# Patient Record
Sex: FEMALE | Race: WHITE | NOT HISPANIC OR LATINO | Employment: UNEMPLOYED | URBAN - METROPOLITAN AREA
[De-identification: names, ages, dates, MRNs, and addresses within clinical notes are randomized per-mention and may not be internally consistent; named-entity substitution may affect disease eponyms.]

---

## 2017-06-27 ENCOUNTER — ALLSCRIPTS OFFICE VISIT (OUTPATIENT)
Dept: OTHER | Facility: OTHER | Age: 37
End: 2017-06-27

## 2017-06-27 PROCEDURE — G0145 SCR C/V CYTO,THINLAYER,RESCR: HCPCS | Performed by: OBSTETRICS & GYNECOLOGY

## 2017-06-28 ENCOUNTER — LAB REQUISITION (OUTPATIENT)
Dept: LAB | Facility: HOSPITAL | Age: 37
End: 2017-06-28
Payer: COMMERCIAL

## 2017-06-28 DIAGNOSIS — Z01.419 ENCOUNTER FOR GYNECOLOGICAL EXAMINATION WITHOUT ABNORMAL FINDING: ICD-10-CM

## 2017-07-09 LAB
LAB AP GYN PRIMARY INTERPRETATION: NORMAL
Lab: NORMAL

## 2017-07-10 ENCOUNTER — GENERIC CONVERSION - ENCOUNTER (OUTPATIENT)
Dept: OTHER | Facility: OTHER | Age: 37
End: 2017-07-10

## 2017-09-25 ENCOUNTER — ALLSCRIPTS OFFICE VISIT (OUTPATIENT)
Dept: OTHER | Facility: OTHER | Age: 37
End: 2017-09-25

## 2017-09-25 ENCOUNTER — LAB REQUISITION (OUTPATIENT)
Dept: LAB | Facility: HOSPITAL | Age: 37
End: 2017-09-25
Payer: COMMERCIAL

## 2017-09-25 DIAGNOSIS — R30.0 DYSURIA: ICD-10-CM

## 2017-09-25 LAB
BACTERIA UR QL AUTO: NEGATIVE
BILIRUB UR QL STRIP: NEGATIVE
CLARITY UR: CLEAR
CLUE CELL (HISTORICAL): NEGATIVE
COLOR UR: YELLOW
GLUCOSE UR STRIP-MCNC: NEGATIVE MG/DL
HGB UR QL STRIP.AUTO: NEGATIVE
HYPHAL YEAST (HISTORICAL): NEGATIVE
KETONES UR STRIP-MCNC: NEGATIVE MG/DL
KOH PREP (HISTORICAL): NEGATIVE
LEUKOCYTE ESTERASE UR QL STRIP: NEGATIVE
NITRITE UR QL STRIP: NEGATIVE
PH UR STRIP.AUTO: 6 [PH] (ref 4.5–8)
PROT UR STRIP-MCNC: NEGATIVE MG/DL
SP GR UR STRIP.AUTO: 1.03 (ref 1–1.03)
TRICHOMONAS (HISTORICAL): NEGATIVE
UROBILINOGEN UR QL STRIP.AUTO: 1 E.U./DL
YEAST (HISTORICAL): NEGATIVE

## 2017-09-25 PROCEDURE — 87086 URINE CULTURE/COLONY COUNT: CPT | Performed by: OBSTETRICS & GYNECOLOGY

## 2017-09-25 PROCEDURE — 81003 URINALYSIS AUTO W/O SCOPE: CPT | Performed by: OBSTETRICS & GYNECOLOGY

## 2017-09-26 LAB — BACTERIA UR CULT: NORMAL

## 2017-09-27 ENCOUNTER — GENERIC CONVERSION - ENCOUNTER (OUTPATIENT)
Dept: OTHER | Facility: OTHER | Age: 37
End: 2017-09-27

## 2017-09-29 ENCOUNTER — ALLSCRIPTS OFFICE VISIT (OUTPATIENT)
Dept: OTHER | Facility: OTHER | Age: 37
End: 2017-09-29

## 2017-09-29 DIAGNOSIS — K43.2 INCISIONAL HERNIA, WITHOUT OBSTRUCTION OR GANGRENE: ICD-10-CM

## 2017-10-18 RX ORDER — ACETAMINOPHEN 500 MG
500 TABLET ORAL EVERY 6 HOURS PRN
COMMUNITY

## 2017-10-18 RX ORDER — IBUPROFEN 400 MG/1
TABLET ORAL EVERY 6 HOURS PRN
COMMUNITY
End: 2022-03-11 | Stop reason: ALTCHOICE

## 2017-10-18 RX ORDER — HYDROXYZINE HYDROCHLORIDE 10 MG/1
25 TABLET, FILM COATED ORAL EVERY 6 HOURS PRN
COMMUNITY
End: 2022-03-11 | Stop reason: ALTCHOICE

## 2017-10-18 RX ORDER — ALBUTEROL SULFATE 90 UG/1
2 AEROSOL, METERED RESPIRATORY (INHALATION) EVERY 6 HOURS PRN
COMMUNITY

## 2017-10-18 NOTE — PRE-PROCEDURE INSTRUCTIONS
Pre-Surgery Instructions:   Medication Instructions    acetaminophen (TYLENOL) 500 mg tablet Instructed patient per Anesthesia Guidelines   albuterol (PROVENTIL HFA,VENTOLIN HFA) 90 mcg/act inhaler Instructed patient per Anesthesia Guidelines   cetirizine (ZyrTEC) 10 mg tablet Instructed patient per Anesthesia Guidelines   cyanocobalamin (VITAMIN B-12) 1,000 mcg tablet Instructed patient per Anesthesia Guidelines   fluticasone (FLONASE) 50 mcg/act nasal spray Instructed patient per Anesthesia Guidelines   hydrOXYzine HCL (ATARAX) 10 mg tablet Instructed patient per Anesthesia Guidelines   ibuprofen (MOTRIN) 400 mg tablet Instructed patient per Anesthesia Guidelines   Levonorgestrel-Ethinyl Estrad (ORSYTHIA PO) Instructed patient per Anesthesia Guidelines   pantoprazole (PROTONIX) 40 mg tablet Instructed patient per Anesthesia Guidelines   Triamcinolone Acetonide (KENALOG EX) Instructed patient per Anesthesia Guidelines  Per anesthesia patient was told to stop NSAIDS and supplements one week preop and on DOS with sip of water to take Pantoprazole  Was instructed to use inhaler if needed

## 2017-10-24 ENCOUNTER — ANESTHESIA EVENT (OUTPATIENT)
Dept: PERIOP | Facility: HOSPITAL | Age: 37
End: 2017-10-24
Payer: COMMERCIAL

## 2017-10-25 ENCOUNTER — ANESTHESIA (OUTPATIENT)
Dept: PERIOP | Facility: HOSPITAL | Age: 37
End: 2017-10-25
Payer: COMMERCIAL

## 2017-10-25 ENCOUNTER — HOSPITAL ENCOUNTER (OUTPATIENT)
Facility: HOSPITAL | Age: 37
Setting detail: OUTPATIENT SURGERY
Discharge: HOME | End: 2017-10-25
Attending: SURGERY | Admitting: SURGERY
Payer: COMMERCIAL

## 2017-10-25 ENCOUNTER — GENERIC CONVERSION - ENCOUNTER (OUTPATIENT)
Dept: PERIOP | Facility: HOSPITAL | Age: 37
End: 2017-10-25

## 2017-10-25 VITALS
DIASTOLIC BLOOD PRESSURE: 52 MMHG | SYSTOLIC BLOOD PRESSURE: 104 MMHG | BODY MASS INDEX: 39.11 KG/M2 | HEIGHT: 59 IN | WEIGHT: 194 LBS | RESPIRATION RATE: 16 BRPM | HEART RATE: 73 BPM | TEMPERATURE: 98.2 F | OXYGEN SATURATION: 95 %

## 2017-10-25 PROBLEM — E66.9 OBESITY (BMI 35.0-39.9 WITHOUT COMORBIDITY): Chronic | Status: ACTIVE | Noted: 2017-10-25

## 2017-10-25 PROBLEM — K43.2 INCISIONAL HERNIA OF ANTERIOR ABDOMINAL WALL WITHOUT OBSTRUCTION OR GANGRENE: Status: ACTIVE | Noted: 2017-10-25

## 2017-10-25 LAB — EXT PREGNANCY TEST URINE: NEGATIVE

## 2017-10-25 PROCEDURE — C1781 MESH (IMPLANTABLE): HCPCS | Performed by: SURGERY

## 2017-10-25 PROCEDURE — 81025 URINE PREGNANCY TEST: CPT | Performed by: ANESTHESIOLOGY

## 2017-10-25 DEVICE — VENTRALEX HERNIA PATCH, 8.0 CM (3.2"), LARGE CIRCLE WITH STRAP
Type: IMPLANTABLE DEVICE | Site: ABDOMEN | Status: FUNCTIONAL
Brand: VENTRALEX

## 2017-10-25 RX ORDER — LIDOCAINE HYDROCHLORIDE 10 MG/ML
INJECTION, SOLUTION INFILTRATION; PERINEURAL AS NEEDED
Status: DISCONTINUED | OUTPATIENT
Start: 2017-10-25 | End: 2017-10-25 | Stop reason: SURG

## 2017-10-25 RX ORDER — ONDANSETRON 2 MG/ML
4 INJECTION INTRAMUSCULAR; INTRAVENOUS EVERY 4 HOURS PRN
Status: DISCONTINUED | OUTPATIENT
Start: 2017-10-25 | End: 2017-10-25 | Stop reason: HOSPADM

## 2017-10-25 RX ORDER — ONDANSETRON 2 MG/ML
4 INJECTION INTRAMUSCULAR; INTRAVENOUS ONCE AS NEEDED
Status: DISCONTINUED | OUTPATIENT
Start: 2017-10-25 | End: 2017-10-25 | Stop reason: HOSPADM

## 2017-10-25 RX ORDER — SODIUM CHLORIDE, SODIUM LACTATE, POTASSIUM CHLORIDE, CALCIUM CHLORIDE 600; 310; 30; 20 MG/100ML; MG/100ML; MG/100ML; MG/100ML
80 INJECTION, SOLUTION INTRAVENOUS CONTINUOUS
Status: DISCONTINUED | OUTPATIENT
Start: 2017-10-25 | End: 2017-10-25 | Stop reason: HOSPADM

## 2017-10-25 RX ORDER — ACETAMINOPHEN 325 MG/1
650 TABLET ORAL EVERY 4 HOURS PRN
Status: DISCONTINUED | OUTPATIENT
Start: 2017-10-25 | End: 2017-10-25 | Stop reason: HOSPADM

## 2017-10-25 RX ORDER — MIDAZOLAM HYDROCHLORIDE 1 MG/ML
INJECTION INTRAMUSCULAR; INTRAVENOUS AS NEEDED
Status: DISCONTINUED | OUTPATIENT
Start: 2017-10-25 | End: 2017-10-25 | Stop reason: SURG

## 2017-10-25 RX ORDER — FENTANYL CITRATE/PF 50 MCG/ML
25 SYRINGE (ML) INJECTION
Status: DISCONTINUED | OUTPATIENT
Start: 2017-10-25 | End: 2017-10-25 | Stop reason: HOSPADM

## 2017-10-25 RX ORDER — METOPROLOL TARTRATE 5 MG/5ML
INJECTION INTRAVENOUS AS NEEDED
Status: DISCONTINUED | OUTPATIENT
Start: 2017-10-25 | End: 2017-10-25 | Stop reason: SURG

## 2017-10-25 RX ORDER — FENTANYL CITRATE 50 UG/ML
INJECTION, SOLUTION INTRAMUSCULAR; INTRAVENOUS AS NEEDED
Status: DISCONTINUED | OUTPATIENT
Start: 2017-10-25 | End: 2017-10-25 | Stop reason: SURG

## 2017-10-25 RX ORDER — ROCURONIUM BROMIDE 10 MG/ML
INJECTION, SOLUTION INTRAVENOUS AS NEEDED
Status: DISCONTINUED | OUTPATIENT
Start: 2017-10-25 | End: 2017-10-25 | Stop reason: SURG

## 2017-10-25 RX ORDER — HYDROCODONE BITARTRATE AND ACETAMINOPHEN 5; 325 MG/1; MG/1
1 TABLET ORAL EVERY 4 HOURS PRN
Status: DISCONTINUED | OUTPATIENT
Start: 2017-10-25 | End: 2017-10-25 | Stop reason: HOSPADM

## 2017-10-25 RX ORDER — PROPOFOL 10 MG/ML
INJECTION, EMULSION INTRAVENOUS AS NEEDED
Status: DISCONTINUED | OUTPATIENT
Start: 2017-10-25 | End: 2017-10-25 | Stop reason: SURG

## 2017-10-25 RX ORDER — SUCCINYLCHOLINE CHLORIDE 20 MG/ML
INJECTION INTRAMUSCULAR; INTRAVENOUS AS NEEDED
Status: DISCONTINUED | OUTPATIENT
Start: 2017-10-25 | End: 2017-10-25 | Stop reason: SURG

## 2017-10-25 RX ORDER — HYDROCODONE BITARTRATE AND ACETAMINOPHEN 5; 325 MG/1; MG/1
1 TABLET ORAL EVERY 4 HOURS PRN
Qty: 30 TABLET | Refills: 0 | Status: SHIPPED | OUTPATIENT
Start: 2017-10-25 | End: 2018-11-24 | Stop reason: ALTCHOICE

## 2017-10-25 RX ORDER — HYDROMORPHONE HYDROCHLORIDE 2 MG/ML
INJECTION, SOLUTION INTRAMUSCULAR; INTRAVENOUS; SUBCUTANEOUS AS NEEDED
Status: DISCONTINUED | OUTPATIENT
Start: 2017-10-25 | End: 2017-10-25 | Stop reason: SURG

## 2017-10-25 RX ORDER — SODIUM CHLORIDE 9 MG/ML
125 INJECTION, SOLUTION INTRAVENOUS CONTINUOUS
Status: DISCONTINUED | OUTPATIENT
Start: 2017-10-25 | End: 2017-10-25 | Stop reason: HOSPADM

## 2017-10-25 RX ORDER — ONDANSETRON 2 MG/ML
INJECTION INTRAMUSCULAR; INTRAVENOUS AS NEEDED
Status: DISCONTINUED | OUTPATIENT
Start: 2017-10-25 | End: 2017-10-25 | Stop reason: SURG

## 2017-10-25 RX ORDER — GLYCOPYRROLATE 0.2 MG/ML
INJECTION INTRAMUSCULAR; INTRAVENOUS AS NEEDED
Status: DISCONTINUED | OUTPATIENT
Start: 2017-10-25 | End: 2017-10-25 | Stop reason: SURG

## 2017-10-25 RX ADMIN — ONDANSETRON HYDROCHLORIDE 4 MG: 2 INJECTION, SOLUTION INTRAVENOUS at 07:54

## 2017-10-25 RX ADMIN — FENTANYL CITRATE 50 MCG: 50 INJECTION, SOLUTION INTRAMUSCULAR; INTRAVENOUS at 07:43

## 2017-10-25 RX ADMIN — GLYCOPYRROLATE 0.4 MG: 0.2 INJECTION, SOLUTION INTRAMUSCULAR; INTRAVENOUS at 09:36

## 2017-10-25 RX ADMIN — DEXAMETHASONE SODIUM PHOSPHATE 8 MG: 10 INJECTION INTRAMUSCULAR; INTRAVENOUS at 07:54

## 2017-10-25 RX ADMIN — FENTANYL CITRATE 50 MCG: 50 INJECTION, SOLUTION INTRAMUSCULAR; INTRAVENOUS at 07:32

## 2017-10-25 RX ADMIN — HYDROMORPHONE HYDROCHLORIDE 0.5 MG: 2 INJECTION, SOLUTION INTRAMUSCULAR; INTRAVENOUS; SUBCUTANEOUS at 09:55

## 2017-10-25 RX ADMIN — ROCURONIUM BROMIDE 5 MG: 10 INJECTION, SOLUTION INTRAVENOUS at 07:37

## 2017-10-25 RX ADMIN — PROPOFOL 200 MG: 10 INJECTION, EMULSION INTRAVENOUS at 07:37

## 2017-10-25 RX ADMIN — METOPROLOL TARTRATE 1 MG: 5 INJECTION INTRAVENOUS at 08:44

## 2017-10-25 RX ADMIN — LIDOCAINE HYDROCHLORIDE 100 MG: 10 INJECTION, SOLUTION INFILTRATION; PERINEURAL at 07:37

## 2017-10-25 RX ADMIN — FENTANYL CITRATE 25 MCG: 50 INJECTION, SOLUTION INTRAMUSCULAR; INTRAVENOUS at 10:09

## 2017-10-25 RX ADMIN — SUCCINYLCHOLINE CHLORIDE 100 MG: 20 INJECTION, SOLUTION INTRAMUSCULAR; INTRAVENOUS at 07:37

## 2017-10-25 RX ADMIN — FENTANYL CITRATE 50 MCG: 50 INJECTION, SOLUTION INTRAMUSCULAR; INTRAVENOUS at 08:04

## 2017-10-25 RX ADMIN — ROCURONIUM BROMIDE 30 MG: 10 INJECTION, SOLUTION INTRAVENOUS at 07:49

## 2017-10-25 RX ADMIN — HYDROCODONE BITARTRATE AND ACETAMINOPHEN 1 TABLET: 5; 325 TABLET ORAL at 11:22

## 2017-10-25 RX ADMIN — GLYCOPYRROLATE 0.4 MG: 0.2 INJECTION, SOLUTION INTRAMUSCULAR; INTRAVENOUS at 09:32

## 2017-10-25 RX ADMIN — SODIUM CHLORIDE, SODIUM LACTATE, POTASSIUM CHLORIDE, AND CALCIUM CHLORIDE 80 ML/HR: .6; .31; .03; .02 INJECTION, SOLUTION INTRAVENOUS at 10:13

## 2017-10-25 RX ADMIN — SODIUM CHLORIDE 125 ML/HR: 0.9 INJECTION, SOLUTION INTRAVENOUS at 06:03

## 2017-10-25 RX ADMIN — ROCURONIUM BROMIDE 15 MG: 10 INJECTION, SOLUTION INTRAVENOUS at 08:10

## 2017-10-25 RX ADMIN — SODIUM CHLORIDE, SODIUM LACTATE, POTASSIUM CHLORIDE, AND CALCIUM CHLORIDE: .6; .31; .03; .02 INJECTION, SOLUTION INTRAVENOUS at 08:19

## 2017-10-25 RX ADMIN — NEOSTIGMINE METHYLSULFATE 2 MG: 1 INJECTION, SOLUTION INTRAMUSCULAR; INTRAVENOUS; SUBCUTANEOUS at 09:36

## 2017-10-25 RX ADMIN — HYDROMORPHONE HYDROCHLORIDE 0.5 MG: 2 INJECTION, SOLUTION INTRAMUSCULAR; INTRAVENOUS; SUBCUTANEOUS at 09:04

## 2017-10-25 RX ADMIN — HYDROMORPHONE HYDROCHLORIDE 0.5 MG: 2 INJECTION, SOLUTION INTRAMUSCULAR; INTRAVENOUS; SUBCUTANEOUS at 09:49

## 2017-10-25 RX ADMIN — METOPROLOL TARTRATE 1 MG: 5 INJECTION INTRAVENOUS at 09:02

## 2017-10-25 RX ADMIN — METOPROLOL TARTRATE 2 MG: 5 INJECTION INTRAVENOUS at 08:23

## 2017-10-25 RX ADMIN — ONDANSETRON 4 MG: 2 INJECTION INTRAMUSCULAR; INTRAVENOUS at 12:19

## 2017-10-25 RX ADMIN — FENTANYL CITRATE 25 MCG: 50 INJECTION, SOLUTION INTRAMUSCULAR; INTRAVENOUS at 10:15

## 2017-10-25 RX ADMIN — NEOSTIGMINE METHYLSULFATE 2 MG: 1 INJECTION, SOLUTION INTRAMUSCULAR; INTRAVENOUS; SUBCUTANEOUS at 09:32

## 2017-10-25 RX ADMIN — HYDROMORPHONE HYDROCHLORIDE 0.5 MG: 2 INJECTION, SOLUTION INTRAMUSCULAR; INTRAVENOUS; SUBCUTANEOUS at 09:54

## 2017-10-25 RX ADMIN — MIDAZOLAM HYDROCHLORIDE 2 MG: 1 INJECTION, SOLUTION INTRAMUSCULAR; INTRAVENOUS at 07:28

## 2017-10-25 RX ADMIN — CEFAZOLIN SODIUM 2000 MG: 2 SOLUTION INTRAVENOUS at 07:35

## 2017-10-25 RX ADMIN — FENTANYL CITRATE 50 MCG: 50 INJECTION, SOLUTION INTRAMUSCULAR; INTRAVENOUS at 08:17

## 2017-10-25 RX ADMIN — FENTANYL CITRATE 25 MCG: 50 INJECTION, SOLUTION INTRAMUSCULAR; INTRAVENOUS at 10:21

## 2017-10-25 NOTE — TREATMENT PLAN
Progress Note - General Surgery   Ronnell Sevilla 40 y o  female MRN: [id number]    Assessment & Plan:   1  Day of Surgery status post    Procedure(s):  LAPAROSCOPY DIAGNOSTIC, HERNIA REPAIR WITH MESH  WITH LYSIS ADHESIONS   by Elsa Robert MD on 10/25/2017  Patient reports pain with movement, well controlled with Vicodin  She has ambulated to the bathroom and was able to void spontaneously without difficulty  Currently she is standing up, getting dressed ready to leave same-day surgery  Her  is present  He is helping her to get dressed  We discussed intraoperative findings, lysis of adhesions, the use of mesh to repair this hernia and the fact that everything went well intraoperatively  All of her questions are answered  We will see her back for regularly scheduled follow-up        Sana Salinas PA-C  Date: 10/25/2017 Time: 1:28 PM   Pager: [phone]

## 2017-10-25 NOTE — DISCHARGE INSTRUCTIONS
[facility] Instructions  Dr Sayda Arellano MD, FACS    1  General: You will feel pulling sensations around the wound or funny aches and pains around the incisions  This is normal  Even minor surgery is a change in your body and this is your bodys way of reaction to it  If you have had abdominal surgery, it may help to support the incision with a small pillow or blanket for comfort when moving or coughing  2  Wound care: Make sure to remove the bandage in about 24 hours, unless instructed otherwise  You usually don't have to redress the wound after 24-48 hours, unless for comfort  Keep the incision clean and dry  Let air get to it  If this Steri-Strips fall off, just keep the wound clean  3  Water: You may shower over the wound, unless there are drain tubes left in place  Do not bathe or use a pool or hot tub until cleared by the physician  You may shower right over the staples or Steri-Strips and packing dry when you are done  4  Activity: You may go up and down stairs, walk as much as you are comfortable, but walk at least 3 times each day  If you have had abdominal surgery, do not lift anything heavier than 15 pounds for at least 2-4 weeks, unless cleared by the doctor  5  Diet: You may resume a regular diet  If you had a same-day surgery or overnight stay surgery, you may wish to eat lightly for a few days: soups, crackers, and sandwiches  You may resume a regular diet when ready  6  Medications: Resume all of your previous medications, unless told otherwise by the doctor  Avoid aspirin or ibuprofen (Advil, Motrin, etc ) products for 2-3 days after the date of surgery  You may, at that time, began to take them again  Tylenol is always fine, unless you are taking any narcotic pain medication containing Tylenol (such as Percocet, Darvocet, Vicodin, or anything containing acetaminophen)  Do not take Tylenol if you're taking these medications   You do not need to take the narcotic pain medications unless you are having significant pain and discomfort  7  Driving: You will need someone to drive you home on the day of surgery  Do not drive or make any important decisions while on narcotic pain medication or 24 hours and after anesthesia or sedation for surgery  Generally, you may drive when your off all narcotic pain medications  8  Upset Stomach: You may take Maalox, Tums, or similar items for an upset stomach  If your narcotic pain medication causes an upset stomach, do not take it on an empty stomach  Try taking it with at least some crackers or toast      9  Constipation: Patients often experienced constipation after surgery  You may take over-the-counter medication for this, such as Metamucil, Senokot, Dulcolax, milk of magnesia, etc  You may take a suppository unless you have had anorectal surgery such as a procedure on your hemorrhoids  If you experience significant nausea or vomiting after abdominal surgery, call the office before trying any of these medications  10  Call the office: If you are experiencing any of the following, fevers above 101 5°, significant nausea or vomiting, if the wound develops drainage and/or is excessive redness around the wound, or if you have significant diarrhea or other worsening symptoms  11  Pain: You may be given a prescription for pain  This will be given to the hospital, the day of surgery  12  Sexual Activity: You may resume sexual activity when you feel ready and comfortable and your incision is sealed and healed without apparent infection risk  13  Urination: If you haven't urinated in 6 hours, go directly to the ER for evaluation for urinary retention       [address], [address]  [place], [address]   Phone: [phone]

## 2017-10-25 NOTE — ANESTHESIA POSTPROCEDURE EVALUATION
Post-Op Assessment Note      CV Status:  Stable    Mental Status:  Alert and awake    Hydration Status:  Euvolemic    PONV Controlled:  Controlled    Airway Patency:  Patent    Post Op Vitals Reviewed:  Yes              BP      Temp      Pulse     Resp      SpO2

## 2017-10-25 NOTE — OP NOTE
LAPAROSCOPY DIAGNOSTIC, HERNIA REPAIR WITH MESH  WITH LYSIS ADHESIONS  Postoperative Note  PATIENT NAME: Chayito Angel  : 1980  MRN: [id number]  [facility] OR ROOM 04    Surgery Date: 10/25/2017    Consent:  The risks, benefits, and alternatives to the surgery were discussed with the patient and with the family prior to surgery, personally by Dr Yasmin Alatorre  If the consent was obtained by the physician assistant or other representative, the consent was reviewed once again personally by the operating physician  Common complications particular for this procedure as well as unusual complications were discussed, including but not limited to:  bleeding, wound infection, prolonged wound healing, open wounds, reoperation, leak from the bowel or viscus, leak from the bile duct or injury to adjacent or other organs or blood vessels in the abdomen  If the surgery was laparoscopic, it was discussed that possible open surgery could also occur during that same surgery and is always an option in laparoscopic surgery  A  was used if necessary  The patient expressed understanding of the issues discussed and wished and consented to the procedure to proceed  All questions were answered  Dr Yasmin Alatorre personally discussed the informed consent with this patient  Pre operative diagnosis:   Abdominal hernia without obstruction or gangrene [K46 9]    Operative Indications:  Symptomatic Ventral Hernia    Operative Findings:  Very low paramedian hernia measuring approximately 3 cm to the left of the midline incision  Incarcerated with a large amount of omentum  Uterus is very high riding at the level of the pubic tubercle  3    Post operative diagnosis:  Post-Op Diagnosis Codes:     * Abdominal hernia without obstruction or gangrene [K46 9]    Procedure:  Procedure(s):  LAPAROSCOPY DIAGNOSTIC, HERNIA REPAIR WITH MESH  WITH LYSIS ADHESIONS    Surgeon(s) and Role:     * Marlene Loyola MD - Primary     * Marin Silverman India Pugh MD - Assisting    The Physician Assistant was medically necessary for surgical safety the case including suturing, retraction, and hemostasis  No qualified resident was available  I was present for the entire procedure  Drains:       Specimens:  * No specimens in log *    Estimated Blood Loss:   200 mL    Anesthesia Type:   Choice     Procedure: The patient was seen in the Holding Room  The risks, benefits, complications, treatment options, and expected outcomes were discussed with the patient  The possibilities of reaction to medication, pulmonary aspiration, perforation of viscus, bleeding, recurrent infection, the need for additional procedures, failure to diagnose a condition, and creating a complication requiring transfusion or operation were discussed with the patient  The patient concurred with the proposed plan, giving informed consent  The site of surgery properly noted/marked  The patient was taken to Operating Room, identified as Bola Nidia  Staff confirmed patient name, , and procedure  A Time Out was held and the above information confirmed  The patient was placed supine  After the induction of anesthesia, a Kunz and oral gastric tube placed  They were prepped and draped in a sterile fashion  A bio-occulsive drape was placed over the skin during draping  An additional timeout was performed  A right upper quadrant incision was made, dissection carried up to the fascia, and the abdomen entered under direct vision  A pneumoperitoneum created and the camera inserted  There was no intra-abdominal injury or bleeding  Additional trochars were placed in the contralateral upper quadrant and right lower quadrant under direct vision  Any adhesions to the intra-abdominal wall were carefully lysed using a combination of blunt dissection, scissor dissection, and a harmonic scalpel or cautery where appropriate    Care was taken not to injure the bowel  The bowel was carefully inspected, and preserved  This was a tedious dissection due to large amount of adhesions in the lower pelvis  Many adhesions were lysed also of the bowel to the anterior abdominal wall  The uterus was very high-riding just to the level of the pubic tubercle and this had to be gently retracted and pushed down to allow enough room for the mesh  The defect was just to the left of midline  The defect was measured 3 cm cm  The fascia was closed, using a suture-passer and 1 PDS sutures, figure-of-eight x3  An appropriate sized mesh a large Ventralex for which the double-layer mesh and tabs were removed, was selected to fit the defect with at least 3-5 cm of overlap  This mesh was oriented to the defect  Alternating [place]-Basilio and Prolene sutures were placed in the mesh outside the abdomen  The mesh was then rolled, placed into the larger port site, unrolled and reoriented intra-abdominally  Circumferential stab incisions were made to secure the mesh to the anterior abdominal wall using the preplaced sutures  This ensured that the mesh laid flat against the intra-abdominal wall  Any gaps in the mesh were then secured using a stapling device  A double crown technique was used to secure the mesh with the stapling device  Great care was taken to ensure there were no gaps in the mesh  The abdomen was reinspected for hemostasis  There was no evidence of bleeding or bowel injury  The larger port sites were closed using an 0 PDS on a suture passer under direct vision  The pneumoperitoneum was released  All skin incisions were closed using 4-0 Monocryl subcuticular sutures  The stab incisions for the mesh sutures were closed using histocryl  All wounds were dressed with Steri-Strips and dressings if needed  Abdominal binder was placed on the abdomen        Instrument, sponge, and needle counts were correct prior to closure and at the conclusion of the case      This is a highly complex case including extensive lysis of adhesions and bowel and uterus involvement, the case took approximately twice as long as the average incisional hernia repair  Some portions of this records may have been generated with voice recognition software  There may be translation, syntax,  or grammatical errors  Occasional wrong word or "sound-a-like" substitutions may have occurred due to the inherent limitations of the voice recognition software  Read the chart carefully and recognize, using context, where substations may have occurred  If you have any questions, please contact the dictating provider for clarification or correction, as needed       Complications: None    Condition: Stable to PACU    SIGNATURE: Lukasz Ballard MD   DATE: October 25, 2017   TIME: 9:56 AM

## 2017-10-25 NOTE — DISCHARGE SUMMARY
Discharge Summary - Bola Jacob 40 y o  female MRN: [id number]    Unit/Bed#: [facility] Encounter: [id number]      Pre-Operative Diagnosis: Pre-Op Diagnosis Codes:     * Abdominal hernia without obstruction or gangrene [K46 9]    Post-Operative Diagnosis: Post-Op Diagnosis Codes:     * Abdominal hernia without obstruction or gangrene [K46 9]    Procedures Performed:  Procedure(s):  LAPAROSCOPY DIAGNOSTIC, HERNIA REPAIR WITH MESH  WITH LYSIS ADHESIONS    Surgeon: Manjula Avelar MD    See H & P for full details of admission and Operative Note for full details of operations performed  Patient was seen and examined prior to discharge  Provisions for Follow-Up Care:  See After Visit Summary for information related to follow-up care and home orders  Disposition: Home, in stable condition  Planned Readmission: No    Discharge Medications:  See after visit summary for reconciled discharge medications provided to patient and family  Post Operative instructions: Reviewed with patient and/or family  Some portions of this record may have been generated with voice recognition software  There may be translation, syntax,  or grammatical errors  Occasional wrong word or "sound-a-like" substitutions may have occurred due to the inherent limitations of the voice recognition software  Read the chart carefully and recognize, using context, where substations may have occurred  If you have any questions, please contact the dictating provider for clarification or correction, as needed       Signature:   Manjula Avelar MD  Date: 10/25/2017 Time: 9:58 AM

## 2017-10-25 NOTE — ANESTHESIA PREPROCEDURE EVALUATION
Review of Systems/Medical History  Patient summary reviewed        Cardiovascular  Negative cardio ROS Hypertension ,    Pulmonary  Asthma: , ,        GI/Hepatic    GERD well controlled,             Endo/Other  Negative endo/other ROS      GYN  Negative gynecology ROS          Hematology  Anemia ,     Musculoskeletal  Negative musculoskeletal ROS        Neurology  Negative neurology ROS      Psychology   Anxiety,            Physical Exam    Airway    Mallampati score: II  TM Distance: >3 FB  Neck ROM: full     Dental   upper dentures,     Cardiovascular  Comment: Negative ROS, Rhythm: regular, Rate: normal, Cardiovascular exam normal    Pulmonary  Pulmonary exam normal Breath sounds clear to auscultation,     Other Findings        Anesthesia Plan  ASA Score- 2       Anesthesia Type- general with ASA Monitors  Additional Monitors:   Airway Plan:           Induction- intravenous  Informed Consent- Anesthetic plan and risks discussed with patient

## 2017-11-03 ENCOUNTER — HOSPITAL ENCOUNTER (EMERGENCY)
Facility: HOSPITAL | Age: 37
Discharge: HOME | End: 2017-11-03
Attending: EMERGENCY MEDICINE | Admitting: EMERGENCY MEDICINE
Payer: COMMERCIAL

## 2017-11-03 VITALS
TEMPERATURE: 98 F | DIASTOLIC BLOOD PRESSURE: 62 MMHG | SYSTOLIC BLOOD PRESSURE: 131 MMHG | WEIGHT: 194.4 LBS | OXYGEN SATURATION: 100 % | BODY MASS INDEX: 39.26 KG/M2 | RESPIRATION RATE: 18 BRPM | HEART RATE: 86 BPM

## 2017-11-03 DIAGNOSIS — L25.9 ACUTE CONTACT DERMATITIS: Primary | ICD-10-CM

## 2017-11-03 PROCEDURE — 99283 EMERGENCY DEPT VISIT LOW MDM: CPT

## 2017-11-03 RX ORDER — HYDROXYZINE HYDROCHLORIDE 25 MG/1
25 TABLET, FILM COATED ORAL ONCE
Status: COMPLETED | OUTPATIENT
Start: 2017-11-03 | End: 2017-11-03

## 2017-11-03 RX ORDER — HYDROXYZINE HYDROCHLORIDE 25 MG/1
25 TABLET, FILM COATED ORAL EVERY 6 HOURS PRN
Qty: 30 TABLET | Refills: 0 | Status: SHIPPED | OUTPATIENT
Start: 2017-11-03 | End: 2018-11-24 | Stop reason: ALTCHOICE

## 2017-11-03 RX ADMIN — HYDROXYZINE HYDROCHLORIDE 25 MG: 25 TABLET, FILM COATED ORAL at 18:48

## 2017-11-03 NOTE — DISCHARGE INSTRUCTIONS
Contact Dermatitis   WHAT YOU NEED TO KNOW:   Contact dermatitis is a skin rash  It develops when you touch something that irritates your skin or causes an allergic reaction  DISCHARGE INSTRUCTIONS:   Call 911 for any of the following:   · You have sudden trouble breathing  · Your throat swells and you have trouble eating  · Your face is swollen  Contact your healthcare provider if:   · You have a fever  · Your blisters are draining pus  · Your rash spreads or does not get better, even after treatment  · You have questions or concerns about your condition or care  Medicines:   · Medicines  help decrease itching and swelling  They will be given as a topical medicine to apply to your rash or as a pill  · Take your medicine as directed  Contact your healthcare provider if you think your medicine is not helping or if you have side effects  Tell him or her if you are allergic to any medicine  Keep a list of the medicines, vitamins, and herbs you take  Include the amounts, and when and why you take them  Bring the list or the pill bottles to follow-up visits  Carry your medicine list with you in case of an emergency  Manage contact dermatitis:   · Take short baths or showers in cool water  Use mild soap or soap-free cleansers  Add oatmeal, baking soda, or cornstarch to the bath water to help decrease skin irritation  · Avoid skin irritants , such as makeup, hair products, soaps, and cleansers  Use products that do not contain perfume or dye  · Apply a cool compress to your rash  This will help soothe your skin  · Keep your skin moist   Rub unscented cream or lotion on your skin to prevent dryness and itching  Do this right after a bath or shower when your skin is still damp  Follow up with your healthcare provider or dermatologist in 2 to 3 days:  Write down your questions so you remember to ask them during your visits     © 2017 [facility] Information is for End User's use only and may not be sold, redistributed or otherwise used for commercial purposes  All illustrations and images included in CareNotes® are the copyrighted property of A D A M , Inc  or Vinicius Sandoval  The above information is an  only  It is not intended as medical advice for individual conditions or treatments  Talk to your doctor, nurse or pharmacist before following any medical regimen to see if it is safe and effective for you

## 2017-11-07 ENCOUNTER — ALLSCRIPTS OFFICE VISIT (OUTPATIENT)
Dept: OTHER | Facility: OTHER | Age: 37
End: 2017-11-07

## 2017-11-08 NOTE — PROGRESS NOTES
Assessment  1  History of Incisional hernia (013 21) (K43 2)    Discussion/Summary    80-year-old female status post laparoscopic repair of ventral incisional hernia with mesh by Dr Susan Acosta on October 25, 2017  great  Pictures from intraoperative laparoscopic repair reviewed with the patient  will see her back on as-needed basis  Chief Complaint  Patient is here for a post op  surgery- ventral hernia 10/25  No pathology Report  Patient said she had an allergic reaction to the steri strips  Patient said she went to the ED for her rash or hives she got from the wrapping  Patient said she has used hydrocortisone cream on the incision  no drainage or bleedingonly concern is the dizziness she is having  Post-Op  HPI: 80-year-old female status post laparoscopic repair of incisional hernia from prior C-sections in her lower abdominal wall  She did have an allergic reaction her abdominal wall skin to the Steri-Strips  She noted that this happened years ago when she had her gallbladder out but had for gotten so we were not aware of her adhesive allergy and we used Steri-Strips  prompted family doctor and then also to the ER  She was allergic to Benadryl topical hydrocortisone  This has helped  is eating, drinking, moving her bowels without difficulty  Some soreness in the right upper quadrant and left lower quadrant but otherwise feeling well  She has discontinued the use of her abdominal binder due to rash  Review of Systems    Constitutional: no fever-- and-- no chills  Eyes: no eye pain  ENT: no nosebleeds-- and-- no nasal discharge  Cardiovascular: no chest pain  Respiratory: no cough  Gastrointestinal: no nausea  Genitourinary: no pelvic pain  Musculoskeletal: no joint swelling  Integumentary: no itching  Neurological: no numbness  Psychiatric: no anxiety  Endocrine: no muscle weakness  Hematologic/Lymphatic: no tendency for easy bleeding  Active Problems  1   Abdominal hernia without obstruction or gangrene (553 20) (K46 9)   2  Acute frontal sinusitis (461 1) (J01 10)   3  Anemia (285 9) (D64 9)   4  Bloating (787 3) (R14 0)   5  BRBPR (bright red blood per rectum) (569 3) (K62 5)   6  Bronchitis (490) (J40)   7  Burning with urination (788 1) (R30 0)   8  Candidiasis (112 9) (B37 9)   9  Conjunctivitis (372 30) (H10 9)   10  Contraceptives (V25 02)   11  Encounter for preconception consultation (V26 49) (Z31 69)   12  Encounter for routine gynecological examination (V72 31) (Z01 419)   13  Eye irritation (379 99) (H57 8)   14  Fatigue (780 79) (R53 83)   15  Folliculitis (730 7) (F99 7)   16  Fungal dermatitis (111 9) (B36 9)   17  History of allergy (V15 09) (Z88 9)   18  Low back pain (724 2) (M54 5)   19  Morbid obesity (278 01) (E66 01)   20  Oral contraceptive prescribed (V25 01) (Z30 011)   21  Ovarian cyst (620 2) (N83 20)   22  Pharyngitis (462) (J02 9)   23  Request for sterilization (V49 89) (Z78 9)   24  Shortness of breath (786 05) (R06 02)   25  Sorethroat (462) (J02 9)   26  URTI (acute upper respiratory infection) (465 9) (J06 9)   27  Viral exanthem (057 9) (B09)   28  Vulvar abscess (616 4) (N76 4)    Social History   · Death Of A Fetus   · Never A Smoker   · Never Drank Alcohol   · No alcohol use   · No drug use    Current Meds   1  B-12 CAPS; Therapy: (Recorded:61Khw2641) to Recorded   2  Colace 100 MG Oral Capsule; TAKE 1 CAPSULE TWICE DAILY; Therapy: 81AFV3043 to (Evaluate:14Oct2017); Last Rx:98Gxq1111 Ordered   3  [employer] Iron 325 (65 Fe) MG Oral Tablet; Take 1 tablet twice daily with meals; Therapy: 27MKV9687 to (Evaluate:05Jun2017)  Requested for: 24XNC6438; Last   Rx:10Jun2016 Ordered   4  Flonase 50 MCG/ACT SUSP; Therapy: (Recorded:40Beh7719) to Recorded   5  Ondansetron 4 MG Oral Tablet Disintegrating; take 1 tablet every 6 hours as needed for   nausea; Therapy: 70RXT9417 to (Last Rx:29Sep2017) Ordered   6   Orsythia 0 1-20 MG-MCG Oral Tablet; Take 1 tablet daily as directed; Therapy: 09ZSA4825 to (Dickson Castelan)  Requested for: 27Jun2017; Last   KIARA:85EIU0696 Ordered   7  Pantoprazole Sodium 40 MG Oral Tablet Delayed Release; Therapy: 44NMQ5629 to Recorded   8  ZyrTEC Allergy 10 MG Oral Capsule; 1 tqablet daily; Therapy: (Recorded:60Gef0296) to Recorded    Allergies  1  Benadryl TABS   2  Nystatin Powder    Vitals   Recorded: 56CVL6220 09:16AM   Temperature 97 4 F, Tympanic   Heart Rate 88, R Radial   Pulse Quality Normal, R Radial   Respiration Quality Normal   Respiration 18   Systolic 226, RUE, Sitting   Diastolic 78, RUE, Sitting   Height 4 ft 11 in   Weight 191 lb 6 oz   BMI Calculated 38 65   BSA Calculated 1 81     Physical Exam    Constitutional   General appearance: No acute distress, well appearing and well nourished          Signatures   Electronically signed by : Kris Molina, [facility]; Nov 7 2017 10:02AM EST                       (Author)    Electronically signed by : Kayleen Menchaca MD; Nov 7 2017 11:11AM EST

## 2017-11-22 ENCOUNTER — GENERIC CONVERSION - ENCOUNTER (OUTPATIENT)
Dept: OTHER | Facility: OTHER | Age: 37
End: 2017-11-22

## 2017-11-24 ENCOUNTER — GENERIC CONVERSION - ENCOUNTER (OUTPATIENT)
Dept: OTHER | Facility: OTHER | Age: 37
End: 2017-11-24

## 2018-01-09 NOTE — MISCELLANEOUS
Message   Recorded as Task   Date: 03/14/2016 04:07 PM, Created By: Danny Kim   Task Name: Follow Up   Assigned To: [facility],Team   Regarding Patient: Donovan Maya, Status: Active   Comment:    Danny Kim - 14 Mar 2016 4:07 PM     TASK CREATED  Caller: Shauna Virginie  Routine post colonsocopy call placed to patient  She had her procedure on 3/11/16  Pathology is pending on the polyps removed  She had no problems to report at the time of this call  Henny Stern - 18 Mar 2016 2:33 PM     TASK EDITED  Called patient with the pathology results  I stressed to her the importance of follow up in 3 years as recommended  She verbalized understanding and she had no questions at the time of the call  Active Problems    1  Acute frontal sinusitis (461 1) (J01 10)   2  Anemia (285 9) (D64 9)   3  Bloating (787 3) (R14 0)   4  BRBPR (bright red blood per rectum) (569 3) (K62 5)   5  Bronchitis (490) (J40)   6  Candidiasis (112 9) (B37 9)   7  Conjunctivitis (372 30) (H10 9)   8  Contraceptives (V25 02)   9  Encounter for preconception consultation (V26 49) (Z31 69)   10  Encounter for routine gynecological examination (V72 31) (Z01 419)   11  Eye irritation (379 99) (H57 8)   12  Fatigue (780 79) (R53 83)   13  Folliculitis (274 1) (J00 9)   14  Fungal dermatitis (111 9) (B36 9)   15  Hernia, abdominal (553 9) (K46 9)   16  History of allergy (V15 09) (Z88 9)   17  Incisional hernia (553 21) (K43 2)   18  Morbid obesity (278 01) (E66 01)   19  Oral contraceptive prescribed (V25 01) (Z30 011)   20  Pharyngitis (462) (J02 9)   21  Shortness of breath (786 05) (R06 02)   22  Sorethroat (462) (J02 9)   23  URTI (acute upper respiratory infection) (465 9) (J06 9)   24  Viral exanthem (057 9) (B09)   25  Vulvar abscess (616 4) (N76 4)    Current Meds   1  Alaway 0 025 % Ophthalmic Solution; Therapy: 43DFJ7225 to Recorded   2   Clindamycin Phosphate 1 % External Lotion; as directed; Therapy: (Recorded:83Yre4718) to Recorded   3  Flonase 50 MCG/ACT Nasal Suspension (Fluticasone Propionate); Therapy: (Recorded:95Mww2444) to Recorded   4  Iron 325 (65 Fe) MG Oral Tablet; TAKE 1 TABLET TWICE DAILY WITH MEALS; Therapy: 10BPG0024 to (Evaluate:16Jun2016)  Requested for: 10TBA1898; Last   Rx:22Jun2015 Ordered   5  Nystatin 704216 UNIT/GM External Powder; APPLY 2-3 TIMES DAILY TO AFFECTED   AREA(S); Therapy: 64GIB2889 to (Evaluate:12Apr2016)  Requested for: 14FIT4853; Last   Rx:01Mar2016 Ordered   6  Orsythia 0 1-20 MG-MCG Oral Tablet; Take 1 tablet daily as directed; Therapy: 08NXH8677 to (Evaluate:54Blp0623)  Requested for: 11Aug2015; Last   Rx:11Aug2015 Ordered   7  Pantoprazole Sodium 40 MG Oral Tablet Delayed Release; Therapy: 58XAU7935 to Recorded   8  PNV Prenatal Plus Multivitamin 27-1 MG Oral Tablet; take one tablet by mouth one time   daily; Therapy: 18PRK7419 to (Evaluate:16Jun2016)  Requested for: 05TVV2218; Last   Rx:22Jun2015 Ordered   9  Triamcinolone Acetonide 0 1 % External Lotion; Therapy: (Recorded:83Jli0542) to Recorded   10  ZyrTEC Allergy 10 MG Oral Capsule; 1 tqablet daily; Therapy: (Recorded:93Bxz3500) to Recorded    Allergies    1   Benadryl TABS    Signatures   Electronically signed by : Tamiko Tirado, ; Mar 18 2016  2:33PM EST                       (Author)

## 2018-01-09 NOTE — PROGRESS NOTES
Assessment    1  Hernia, abdominal (263 9) (K46 9)    Discussion/Summary  Discussion Summary:   54-year-old female who presents to the office for follow-up on her CAT scan  On exam, she has a left lower quadrant abdominal wall hernia palpable  Unable to reduce at this time  Remainder of abdomen is benign  CAT scan of abdomen and pelvis reviewed personally and images reviewed with the patient  She is given a copy of the report  She understands to follow up with her OB/GYN doctor Aruna to follow-up with her ovarian cyst seen on the CAT scan  We will send Dr Stacey Cantu task to this the fact that she is aware to review this CAT scan as well  She will continue her weight loss program with the goal weight of less than 140 pounds  She understands the symptoms of obstruction is hernia  If she develops any of these, she will return to the ER urgently  If this becomes troublesome or increased in size or symptomatology, she will return for scheduled repair  Otherwise, we will wait for her to continue her weight loss in the hopes that we can repair this in the fall when she reaches weight  This is due to the fact that recurrence is rate is directly related to BMI, and optimizing her weight would be ideal before undergoing repair  Chief Complaint  Chief Complaint Free Text Note Form: Patient is here for follow-up of ventral hernia  -Little pain today  -no n/v/f/c  History of Present Illness  HPI: Patient is a 28year old female that presents for follow-up of left lower quadrant hernia and to review CT  She would like to discuss the results and would like to discuss timing for possible repair  Patient had colonoscopy on Friday and states this weekend had bloating and mild GI discomfort with constipation  Patient states today, she is feeling much better as she has had a bowel movement  Pertaining to her hernia, she states it bothers her when she does lifting however is not causing her any pain at present  She is in the weight-watchers course and is continuing to lose weight  She has lost approximately 55 lbs so far and would like to lose more  She is also recovering from yeast infection/rash underlying pannus where cesarian incision is which is being followed by her ob-gyn  Denies n/v/f/c  CT showed 3 cm in diameter ventral abdominal hernia with incidental finding of 3 7 x 3 0 cm right ovarian lesion  BG    36y/o female who presents for follow-up on abdominal CAT scan for evaluation of left lower quadrant ventral hernia  She is status post colonoscopy by Dr Veronique Alegre on Friday  Complaining of some bloating after her colonoscopy but now moving her bowels  With bowel movements some of this abdominal discomfort and bloating resolved  No nausea or vomiting  Comes in to follow-up on CAT scan of abdomen and pelvis from  to evaluate left lower quadrant abdominal wall hernia  On review of hernia images myself and with the patient, and CAT scan report, left lower quadrant incisional hernia is seen from likely her  section incision  Also there is an ovarian cyst  Hernia contents seen with no bowel and what appears to be omentum at the site of palpable abnormality on exam     She continues to lose weight and has lost 5 pounds since her last office visit  She has lost a total of over 55 pounds October  She continues to workout and has adjusted her lifestyle to continue weight loss  Her goal weight is approximately 60 pounds down from her current weight  Current weight is 197 8 pounds  Her goal weight is less than 140  She hopes to reach her goal weight within the next 6-8 months  Symptoms regarding this hernia really only happens when she is lifting her 11year-old daughter  Otherwise, she is able to go about her activities of daily living without any severe pain or modification  She continues working out  Review of Systems  Complete-Female:   Constitutional: no fever and no chills     ENT: no nosebleeds  Cardiovascular: no chest pain and no palpitations  Respiratory: no shortness of breath and no cough  Gastrointestinal: constipation, but as noted in HPI and no blood in stools  Genitourinary: No complaints of dysuria, no incontinence, no pelvic pain, no dysmenorrhea, no vaginal discharge or bleeding  Musculoskeletal: No complaints of arthralgias, no myalgias, no joint swelling or stiffness, no limb pain or swelling  Integumentary: a rash, but as noted in HPI  Neurological: no headache and no numbness  Psychiatric: Not suicidal, no sleep disturbance, no anxiety or depression, no change in personality, no emotional problems  Active Problems    1  Acute frontal sinusitis (461 1) (J01 10)   2  Anemia (285 9) (D64 9)   3  Bloating (787 3) (R14 0)   4  BRBPR (bright red blood per rectum) (569 3) (K62 5)   5  Bronchitis (490) (J40)   6  Candidiasis (112 9) (B37 9)   7  Conjunctivitis (372 30) (H10 9)   8  Contraceptives (V25 02)   9  Encounter for preconception consultation (V26 49) (Z31 69)   10  Encounter for routine gynecological examination (V72 31) (Z01 419)   11  Eye irritation (379 99) (H57 8)   12  Fatigue (780 79) (R53 83)   13  Folliculitis (786 6) (B41 0)   14  Fungal dermatitis (111 9) (B36 9)   15  Hernia, abdominal (553 9) (K46 9)   16  History of allergy (V15 09) (Z88 9)   17  Incisional hernia (553 21) (K43 2)   18  Morbid obesity (278 01) (E66 01)   19  Oral contraceptive prescribed (V25 01) (Z30 011)   20  Pharyngitis (462) (J02 9)   21  Shortness of breath (786 05) (R06 02)   22  Sorethroat (462) (J02 9)   23  URTI (acute upper respiratory infection) (465 9) (J06 9)   24  Viral exanthem (057 9) (B09)   25  Vulvar abscess (616 4) (N76 4)    Past Medical History    1  Acute sinusitis (461 9) (J01 90)   2  History of gastroesophageal reflux (GERD) (V12 79) (Z87 19)   3  History of seasonal allergies (V15 09) (Z88 9)   4   Oral contraceptive prescribed (V25 01) (Z30 011)  Active Problems And Past Medical History Reviewed: The active problems and past medical history were reviewed and updated today  Surgical History    1  History of  Section   2  History of Cholecystectomy   3  Contraceptives (V25 02)  Surgical History Reviewed: The surgical history was reviewed and updated today  Family History    1  Family history of skin cancer (V16 8) (Z80 8)    2  Family history of Diabetes Mellitus (V18 0)   3  Family history of Kidney Cancer (V16 51)    4  Family history of colon cancer (V16 0) (Z80 0)  Family History Reviewed: The family history was reviewed and updated today  Social History    · Death Of A Fetus   · Never A Smoker   · Never Drank Alcohol   · No drug use  Social History Reviewed: The social history was reviewed and updated today  The social history was reviewed and is unchanged  Current Meds   1  Alaway 0 025 % Ophthalmic Solution; Therapy: 86OSL2157 to Recorded   2  Clindamycin Phosphate 1 % External Lotion; as directed; Therapy: (Recorded:40Wlu1384) to Recorded   3  Flonase 50 MCG/ACT SUSP; Therapy: (Recorded:00Drc2020) to Recorded   4  Iron 325 (65 Fe) MG Oral Tablet; TAKE 1 TABLET TWICE DAILY WITH MEALS; Therapy: 29MTT0893 to (Evaluate:2016)  Requested for: 34ITB6352; Last   Rx:2015 Ordered   5  Nystatin 416609 UNIT/GM External Powder; APPLY 2-3 TIMES DAILY TO AFFECTED   AREA(S); Therapy: 20SBO9327 to (Evaluate:2016)  Requested for: 73PTA5285; Last   Rx:2016 Ordered   6  Orsythia 0 1-20 MG-MCG Oral Tablet; Take 1 tablet daily as directed; Therapy: 57XBS5751 to (Evaluate:29Wqw2485)  Requested for: 2015; Last   Rx:2015 Ordered   7  Pantoprazole Sodium 40 MG Oral Tablet Delayed Release; Therapy: 23TBF8681 to Recorded   8  PNV Prenatal Plus Multivitamin 27-1 MG Oral Tablet; take one tablet by mouth one time   daily;    Therapy: 75LHM9667 to (Evaluate:2016)  Requested for: 37IRB6882; Last   Rx:2015 Ordered   9  Triamcinolone Acetonide 0 1 % External Lotion; Therapy: (Recorded:92Zoe7520) to Recorded   10  ZyrTEC Allergy 10 MG Oral Capsule; 1 tqablet daily; Therapy: (Recorded:01Icp7697) to Recorded  Medication List Reviewed: The medication list was reviewed and updated today  Allergies    1  Benadryl TABS    Vitals  Vital Signs [Data Includes: Current Encounter]    Recorded: 86JAF1832 09:44AM   Temperature 98 2 F, Tympanic   Heart Rate 68   Respiration 18   Systolic 533, LUE   Diastolic 72, LUE   Height 4 ft 11 in   Weight 197 lb 12 8 oz   BMI Calculated 39 95   BSA Calculated 1 84     Physical Exam    Constitutional   General appearance: No acute distress, well appearing and well nourished  Eyes   Conjunctiva and lids: No swelling, erythema or discharge  Neck   Supple, symmetric, trachea midline, no masses   Pulmonary   Respiratory effort: No increased work of breathing or signs of respiratory distress  Auscultation of lungs: Clear to auscultation, equal breath sounds bilaterally, no wheezes, no rales, no rhonci  Cardiovascular   Auscultation of heart: Normal rate and rhythm, normal S1 and S2, without murmurs  Examination of extremities for edema and/or varicosities: Normal     Abdomen   Abdomen: Abnormal   palpable hernia left lower quadrant  Liver and spleen: No hepatomegaly or splenomegaly  Musculoskeletal   Gait and station: Normal     Skin   Skin and subcutaneous tissue: Abnormal   skin rash underling left pannus at  incision  Neurologic   Cranial nerves: Cranial nerves 2-12 intact  Psychiatric   Orientation to person, place, and time: Normal        Results/Data  Diagnostic Studies Reviewed: I personally reviewed the films/images/results in the office today  My interpretation follows  CT Scan Review 3 cm diameter LLQ herniation, fat, no bowel loops  -incidental 3 7 x 3 0 cm right ovarian lesions        Provider Comments  Provider Comments: This report has been generated by a voice recognition software system  Therefore, there may be syntax, spelling, and/or grammatical errors  Please call if you've any questions  Future Appointments    Date/Time Provider Specialty Site   06/23/2016 10:30 AM LINDA Mendez   Obstetrics/Gynecology [facility] [address]     Signatures   Electronically signed by : Julienne Ramírez [facility]; Mar 14 2016 10:51AM EST                       (Author)    Electronically signed by : Bossman Brooks MD; Mar 14 2016 10:56AM EST

## 2018-01-12 NOTE — MISCELLANEOUS
Message   Recorded as Task   Date: 03/22/2016 09:55 AM, Created By: Michael Cohn   Task Name: Call Patient with results   Assigned To: Kp Hernandez   Regarding Patient: Bunny Gamble, Status: Active   Comment:    IgoralaynaJosei - 22 Mar 2016 9:55 AM     Patient Phone: [phone]    resolution of ovarian cyst  please advise   Odalys Mcdaniel - 22 Mar 2016 5:17 PM     TASK REPLIED TO: Previously Assigned To Kp Hernandez  The patient is aware of the results  She did call in and state that she was having some pain on the right side and nausea  I made Dr Olman Hester aware and she is having the patient call the Doctor who is taking care of her hernia to let them know as well    see note below      Active Problems    1  Acute frontal sinusitis (461 1) (J01 10)   2  Anemia (285 9) (D64 9)   3  Bloating (787 3) (R14 0)   4  BRBPR (bright red blood per rectum) (569 3) (K62 5)   5  Bronchitis (490) (J40)   6  Candidiasis (112 9) (B37 9)   7  Conjunctivitis (372 30) (H10 9)   8  Contraceptives (V25 02)   9  Encounter for preconception consultation (V26 49) (Z31 69)   10  Encounter for routine gynecological examination (V72 31) (Z01 419)   11  Eye irritation (379 99) (H57 8)   12  Fatigue (780 79) (R53 83)   13  Folliculitis (290 9) (I95 3)   14  Fungal dermatitis (111 9) (B36 9)   15  Hernia, abdominal (553 9) (K46 9)   16  History of allergy (V15 09) (Z88 9)   17  Incisional hernia (553 21) (K43 2)   18  Morbid obesity (278 01) (E66 01)   19  Oral contraceptive prescribed (V25 01) (Z30 011)   20  Ovarian cyst (620 2) (N83 20)   21  Pharyngitis (462) (J02 9)   22  Shortness of breath (786 05) (R06 02)   23  Sorethroat (462) (J02 9)   24  URTI (acute upper respiratory infection) (465 9) (J06 9)   25  Viral exanthem (057 9) (B09)   26  Vulvar abscess (616 4) (N76 4)    Current Meds   1  Alaway 0 025 % Ophthalmic Solution; Therapy: 87XQX8809 to Recorded   2   Clindamycin Phosphate 1 % External Lotion; as directed; Therapy: (Recorded:70Bqn9227) to Recorded   3  Flonase 50 MCG/ACT Nasal Suspension (Fluticasone Propionate); Therapy: (Recorded:48Els3021) to Recorded   4  Iron 325 (65 Fe) MG Oral Tablet; TAKE 1 TABLET TWICE DAILY WITH MEALS; Therapy: 51TBW4938 to (Evaluate:16Jun2016)  Requested for: 56JLG9654; Last   Rx:22Jun2015 Ordered   5  Nystatin 161767 UNIT/GM External Powder; APPLY 2-3 TIMES DAILY TO AFFECTED   AREA(S); Therapy: 04DXI8667 to (Evaluate:12Apr2016)  Requested for: 23IYZ7523; Last   Rx:01Mar2016 Ordered   6  Orsythia 0 1-20 MG-MCG Oral Tablet; Take 1 tablet daily as directed; Therapy: 36ZCM2730 to (Evaluate:63Fuq8000)  Requested for: 11Aug2015; Last   Rx:11Aug2015 Ordered   7  Pantoprazole Sodium 40 MG Oral Tablet Delayed Release; Therapy: 42VFB9183 to Recorded   8  PNV Prenatal Plus Multivitamin 27-1 MG Oral Tablet; take one tablet by mouth one time   daily; Therapy: 24YSP5808 to (Evaluate:16Jun2016)  Requested for: 16IHQ2177; Last   Rx:22Jun2015 Ordered   9  Triamcinolone Acetonide 0 1 % External Lotion; Therapy: (Recorded:80Vyp6883) to Recorded   10  ZyrTEC Allergy 10 MG Oral Capsule; 1 tqablet daily; Therapy: (Recorded:10Coy6972) to Recorded    Allergies    1   Benadryl TABS    Signatures   Electronically signed by : LINDA Maharaj ; Mar 24 2016 10:50AM EST

## 2018-01-13 VITALS
DIASTOLIC BLOOD PRESSURE: 72 MMHG | BODY MASS INDEX: 38.75 KG/M2 | WEIGHT: 192.19 LBS | HEIGHT: 59 IN | SYSTOLIC BLOOD PRESSURE: 138 MMHG

## 2018-01-13 VITALS
SYSTOLIC BLOOD PRESSURE: 140 MMHG | WEIGHT: 195 LBS | HEIGHT: 59 IN | TEMPERATURE: 97.9 F | DIASTOLIC BLOOD PRESSURE: 80 MMHG | RESPIRATION RATE: 20 BRPM | HEART RATE: 88 BPM | BODY MASS INDEX: 39.31 KG/M2

## 2018-01-13 NOTE — RESULT NOTES
Verified Results  (1) THIN PREP PAP WITH IMAGING [id number] 10:51AM Claudine Billy     Test Name Result Flag Reference   LAB AP CASE REPORT (Report)     Gynecologic Cytology Report            Case: [id number]                  Authorizing Provider: Cyrus Garcia MD     Collected:      06/27/2017           First Screen:     ANDRÉS Petersen Received:      07/03/2017 1014        Specimen:  LIQUID-BASED PAP, SCREENING, Endocervical   LAB AP GYN PRIMARY INTERPRETATION      Negative for intraepithelial lesion or malignancy  Electronically signed by ANDRÉS Petersen on 7/9/2017 at 10:51 AM   LAB AP GYN SPECIMEN ADEQUACY      Satisfactory for evaluation  Absence of endocervical/transformation zone component  LAB AP GYN ADDITIONAL INFORMATION (Report)     [facility] FDA approved ,  and ThinPrep Imaging System are   utilized with strict adherence to the 's instruction manual to   prepare gynecologic and non-gynecologic cytology specimens for the   production of ThinPrep slides as well as for gynecologic ThinPrep imaging  These processes have been validated by our laboratory and/or by the     The Pap test is not a diagnostic procedure and should not be used as the   sole means to detect cervical cancer  It is only a screening procedure to   aid in the detection of cervical cancer and its precursors  Both   false-negative and false-positive results have been experienced  Your   patient's test result should be interpreted in this context together with   the history and clinical findings

## 2018-01-13 NOTE — MISCELLANEOUS
Message   Recorded as Task   Date: 11/24/2017 09:11 AM, Created By: Yoan Multani   Task Name: Follow Up   Assigned To: [facility],Team   Regarding Patient: Shahbaz Sanchez, Status: Active   Oscar Bundy - 24 Nov 2017 9:11 AM     TASK CREATED  Pt cancelled appt today, father is having emergant surgery  Pt states she has been wathing her food intact and she feels that this does have something to do with the discomfort  Going to monitor intake and if things do not resolve she will contact office to reschedule  Active Problems    1  Abdominal hernia without obstruction or gangrene (553 20) (K46 9)   2  Acute frontal sinusitis (461 1) (J01 10)   3  Anemia (285 9) (D64 9)   4  Bloating (787 3) (R14 0)   5  BRBPR (bright red blood per rectum) (569 3) (K62 5)   6  Bronchitis (490) (J40)   7  Burning with urination (788 1) (R30 0)   8  Candidiasis (112 9) (B37 9)   9  Conjunctivitis (372 30) (H10 9)   10  Contraceptives (V25 02)   11  Encounter for preconception consultation (V26 49) (Z31 69)   12  Encounter for routine gynecological examination (V72 31) (Z01 419)   13  Eye irritation (379 99) (H57 8)   14  Fatigue (780 79) (R53 83)   15  Folliculitis (428 7) (C33 5)   16  Fungal dermatitis (111 9) (B36 9)   17  History of allergy (V15 09) (Z88 9)   18  Low back pain (724 2) (M54 5)   19  Morbid obesity (278 01) (E66 01)   20  Oral contraceptive prescribed (V25 01) (Z30 011)   21  Ovarian cyst (620 2) (N83 20)   22  Pharyngitis (462) (J02 9)   23  Request for sterilization (V49 89) (Z78 9)   24  Shortness of breath (786 05) (R06 02)   25  Sorethroat (462) (J02 9)   26  URTI (acute upper respiratory infection) (465 9) (J06 9)   27  Viral exanthem (057 9) (B09)   28  Vulvar abscess (616 4) (N76 4)    Current Meds   1  B-12 CAPS; Therapy: (Recorded:10Wgg6423) to Recorded   2  Colace 100 MG Oral Capsule; TAKE 1 CAPSULE TWICE DAILY; Therapy: 16QDH9000 to (Evaluate:14Oct2017);  Last Rx: 69EOY1094 Ordered   3  [employer] Iron 325 (65 Fe) MG Oral Tablet; Take 1 tablet twice daily with meals; Therapy: 48RMN8102 to (Evaluate:05Jun2017)  Requested for: 37MWN7323; Last   Rx:10Jun2016 Ordered   4  Flonase 50 MCG/ACT SUSP (Fluticasone Propionate); Therapy: (Recorded:11Nmm5446) to Recorded   5  Ondansetron 4 MG Oral Tablet Disintegrating; take 1 tablet every 6 hours as needed for   nausea; Therapy: 84YRQ9905 to (Last Rx:04Swq4460) Ordered   6  Orsythia 0 1-20 MG-MCG Oral Tablet; Take 1 tablet daily as directed; Therapy: 89SKA4922 to ([facility])  Requested for: 27Jun2017; Last   RO:20GUU4435 Ordered   7  Pantoprazole Sodium 40 MG Oral Tablet Delayed Release; Therapy: 94PND2524 to Recorded   8  ZyrTEC Allergy 10 MG Oral Capsule; 1 tqablet daily; Therapy: (Recorded:57Jtq9182) to Recorded    Allergies    1  Benadryl TABS   2  Nystatin Powder   3   Adhesive Tape TAPE    Signatures   Electronically signed by : Harini Duncan, ; Nov 24 2017  1:02PM EST                       (Author)

## 2018-01-13 NOTE — MISCELLANEOUS
Message   Recorded as Task   Date: 10/26/2017 10:44 AM, Created By: Jazmín Trotter   Task Name: Follow Up   Assigned To: [facility],Team   Regarding Patient: Bipin Duran, Status: Active   CommentQuincy Benavidez - 26 Oct 2017 10:44 AM     TASK CREATED  Left message to return call post op hernia repair  Jazmín Trotter - 26 Oct 2017 10:59 AM     TASK EDITED  Spoke w/ pt and she is doing ok, c/o spasms on right side, taking pain meds which are helping  Drsg dry and intact no drainage noted  Is ambulating ad apollo and eating regular diet  Advised to contact office w/ any questions or concerns, post op visit 11/7  Active Problems    1  Abdominal hernia without obstruction or gangrene (553 20) (K46 9)   2  Acute frontal sinusitis (461 1) (J01 10)   3  Anemia (285 9) (D64 9)   4  Bloating (787 3) (R14 0)   5  BRBPR (bright red blood per rectum) (569 3) (K62 5)   6  Bronchitis (490) (J40)   7  Burning with urination (788 1) (R30 0)   8  Candidiasis (112 9) (B37 9)   9  Conjunctivitis (372 30) (H10 9)   10  Contraceptives (V25 02)   11  Encounter for preconception consultation (V26 49) (Z31 69)   12  Encounter for routine gynecological examination (V72 31) (Z01 419)   13  Eye irritation (379 99) (H57 8)   14  Fatigue (780 79) (R53 83)   15  Folliculitis (249 8) (K83 9)   16  Fungal dermatitis (111 9) (B36 9)   17  History of allergy (V15 09) (Z88 9)   18  Incisional hernia (553 21) (K43 2)   19  Low back pain (724 2) (M54 5)   20  Morbid obesity (278 01) (E66 01)   21  Oral contraceptive prescribed (V25 01) (Z30 011)   22  Ovarian cyst (620 2) (N83 20)   23  Pharyngitis (462) (J02 9)   24  Request for sterilization (V49 89) (Z78 9)   25  Shortness of breath (786 05) (R06 02)   26  Sorethroat (462) (J02 9)   27  URTI (acute upper respiratory infection) (465 9) (J06 9)   28  Viral exanthem (057 9) (B09)   29  Vulvar abscess (616 4) (N76 4)    Current Meds   1  B-12 CAPS;    Therapy: (Recorded:16Jzx7761) to Recorded   2  Colace 100 MG Oral Capsule; TAKE 1 CAPSULE TWICE DAILY; Therapy: 12DJM5795 to (Evaluate:14Oct2017); Last Rx:29Sep2017 Ordered   3  [facility] Iron 325 (65 Fe) MG Oral Tablet; Take 1 tablet twice daily with meals; Therapy: 88YMD9891 to (Evaluate:05Jun2017)  Requested for: 85HFG1809; Last   Rx:10Jun2016 Ordered   4  Flonase 50 MCG/ACT SUSP (Fluticasone Propionate); Therapy: (Recorded:52Gmc2953) to Recorded   5  Ondansetron 4 MG Oral Tablet Disintegrating; take 1 tablet every 6 hours as needed for   nausea; Therapy: 08ZWA5605 to (Last Rx:29Sep2017) Ordered   6  Orsythia 0 1-20 MG-MCG Oral Tablet; Take 1 tablet daily as directed; Therapy: 38LTK2859 to (Mitzi Relic)  Requested for: 27Jun2017; Last   TY:02YZN8717 Ordered   7  Pantoprazole Sodium 40 MG Oral Tablet Delayed Release; Therapy: 96LZH7255 to Recorded   8  ZyrTEC Allergy 10 MG Oral Capsule; 1 tqablet daily; Therapy: (Recorded:20Jkk8035) to Recorded    Allergies    1  Benadryl TABS   2   Nystatin Powder    Signatures   Electronically signed by : Luis Armando Campos, ; Oct 26 2017 11:00AM EST                       (Author)

## 2018-01-13 NOTE — PROGRESS NOTES
Assessment    1  Hernia, abdominal (553 9) (K46 9)   2  Fungal dermatitis (111 9) (B36 9)    Plan  Candidiasis    · DrRx Mycolog II Cream 15 Grams; apply to area bid for 2 weeks   Rx By: Lucrecia Floyd; Dispense: 14 Days ; #:1; Refill: 1; For: Candidiasis; LINDSAY = N; Faxed To: [facility]/PHARMACY #[id number] URTI (acute upper respiratory infection)    · Azithromycin 250 MG Oral Tablet   Rx By: Laina Bartlett; Dispense: 0 Days ; #:6 Tablet; Refill: 0; For: URTI (acute upper respiratory infection); LINDSAY = N; Print Rx; Last Updated By: Prabhu Spaulding; 2016 9:37:19 AM    Discussion/Summary  Discussion Summary:   Pt referred to Dr Veronique Alegre for evaluation of abdominal defect  Given rx for fungual dermatitis under pannous  rto prn  GYN Discussion and Summary:    Candidiasis--  Counseling Documentation With Imm: The patient was counseled regarding diagnostic results, instructions for management, risk factor reductions, prognosis, patient and family education, impressions, risks and benefits of treatment options, importance of compliance with treatment  total time of encounter was 25 minutes and 15 minutes was spent counseling  Chief Complaint  Chief Complaint Free Text Note Form: Pt presents today for a lump in her abdominal area  History of Present Illness  HPI: 36yo G3 with c/o painful lump in abdomen  States noticed it when she was straining with BM  Has recently lost #50 through weight watchers  No nausea, vomiting or change in bowel/bladder habits  S/P 3  sections  Review of Systems   Female ROS: pelvic pain, but no pelvic pressure, no vaginal pain, no vaginal discharge, no vaginal itching, no vaginal odor, no nonmenstrual bleeding, no vulvar pain, no vulvar itching, no vulvar lump or mass, no dysuria, no bladder pain, no burning sensation during urination, no change in urinary frequency, urine is not foul-smelling and no urinary loss of control     Focused-Female: Constitutional: No fever, no chills, feels well, no tiredness, no recent weight gain or loss  Gastrointestinal: no complaints of abdominal pain, no constipation, no nausea or diarrhea, no vomiting, no bloody stools  Genitourinary: no complaints of dysuria, no incontinence, no pelvic pain, no dysmenorrhea, no vaginal discharge or abnormal vaginal bleeding  Integumentary: as noted in HPI  Neurological: no complaints of headache, no confusion, no numbness or tingling, no dizziness or fainting  ROS Reviewed:   ROS reviewed  Active Problems    1  Acute frontal sinusitis (461 1) (J01 10)   2  Anemia (285 9) (D64 9)   3  Bloating (787 3) (R14 0)   4  Bronchitis (490) (J40)   5  Conjunctivitis (372 30) (H10 9)   6  Contraceptives (V25 02)   7  Encounter for preconception consultation (V26 49) (Z31 69)   8  Encounter for routine gynecological examination (V72 31) (Z01 419)   9  Eye irritation (379 99) (H57 8)   10  Fatigue (780 79) (R53 83)   11  Folliculitis (081 9) (E72 6)   12  History of allergy (V15 09) (Z88 9)   13  Oral contraceptive prescribed (V25 01) (Z30 011)   14  Pharyngitis (462) (J02 9)   15  Shortness of breath (786 05) (R06 02)   16  Sorethroat (462) (J02 9)   17  URTI (acute upper respiratory infection) (465 9) (J06 9)   18  Viral exanthem (057 9) (B09)   19  Vulvar abscess (616 4) (N76 4)    Past Medical History    1  Acute sinusitis (461 9) (J01 90)   2  Oral contraceptive prescribed (V25 01) (Z30 011)  Active Problems And Past Medical History Reviewed: The active problems and past medical history were reviewed and updated today  Surgical History    1  History of  Section   2  History of Cholecystectomy   3  Contraceptives (V25 02)  Surgical History Reviewed: The surgical history was reviewed and updated today  Family History    1  Family history of Diabetes Mellitus (V18 0)   2  Family history of Kidney Cancer (V16 51)    3   Family history of colon cancer (V16 0) (Z80 0)  Family History Reviewed: The family history was reviewed and updated today  Social History    · Death Of A Fetus   · Never A Smoker   · Never Drank Alcohol   · No drug use  Social History Reviewed: The social history was reviewed and updated today  The social history was reviewed and is unchanged  Current Meds   1  Alaway 0 025 % Ophthalmic Solution; Therapy: 46PNJ2846 to Recorded   2  Azithromycin 250 MG Oral Tablet; Take 2 tablets today, then 1 tablet daily for 4 days; Therapy: 00Aby5629 to (Last Rx:14Kxf9823) Ordered   3  Flonase 50 MCG/ACT SUSP; Therapy: (Recorded:88Ybp6250) to Recorded   4  Iron 325 (65 Fe) MG Oral Tablet; TAKE 1 TABLET TWICE DAILY WITH MEALS; Therapy: 88OAX5010 to (Evaluate:16Jun2016)  Requested for: 18MWO2555; Last   Rx:22Jun2015 Ordered   5  Orsythia 0 1-20 MG-MCG Oral Tablet; Take 1 tablet daily as directed; Therapy: 81BDR7937 to (Evaluate:38Kzr2454)  Requested for: 11Aug2015; Last   Rx:11Aug2015 Ordered   6  Pantoprazole Sodium 40 MG Oral Tablet Delayed Release; Therapy: 10PMU3098 to Recorded   7  PNV Prenatal Plus Multivitamin 27-1 MG Oral Tablet; take one tablet by mouth one time   daily; Therapy: 30YNR9362 to (Evaluate:16Jun2016)  Requested for: 72CMK3512; Last   DQ:92IEZ1295 Ordered  Medication List Reviewed: The medication list was reviewed and updated today  Allergies    1  Benadryl TABS    Vitals  Vital Signs [Data Includes: Current Encounter]    Recorded: 66VAL6390 20:38WA   Systolic 620   Diastolic 68   Height 4 ft 11 in   Weight 203 lb 8 96 oz   BMI Calculated 41 11   BSA Calculated 1 86   LMP 21-Feb-2016     Physical Exam    Constitutional   General appearance: No acute distress, well appearing and well nourished  Pulmonary   Respiratory effort: No increased work of breathing or signs of respiratory distress      Abdomen   Abdomen: Abnormal   (palpable defect with increased bulging when pt instructed to sit up w/o use of hands) The abdomen was obese  Skin findings: a scar in the suprapubic area   The abdomen was soft and nontender  6x6cm cm mass palpated in the left lower quadrant  fungal rash under pannous  Future Appointments    Date/Time Provider Specialty Site   06/23/2016 10:30 AM LINDA Ortiz  Obstetrics/Gynecology [facility]   02/29/2016 02:00 PM Phi Banks MD General Surgery [facility]     Signatures   Electronically signed by :  Citlalli Roy; Feb 24 2016 11:06AM EST                       (Author)    Electronically signed by : LINDA House ; Feb 25 2016  4:02PM EST

## 2018-01-13 NOTE — MISCELLANEOUS
Message   Recorded as Task   Date: 03/10/2016 11:15 AM, Created By: Deo Hurd   Task Name: Call Back   Assigned To: [facility],Team   Regarding Patient: Ira Bernard, Status: Active   Comment:    Deo Hurd - 10 Mar 2016 11:15 AM     TASK CREATED  Caller: Bhumi Adam, Spouse  Pateint's  called the office for the CT of the abdomen results from 3/9/16  Healther or Bhumi Blood can be reached at [phone] or [phone]  Henny Stern - 10 Mar 2016 11:30 AM     TASK EDITED  Called patient and informed her that the CT findings confirmed a hernia  Henny Stern - 10 Mar 2016 5:16 PM     TASK EDITED  MONA Schmid advises trippt come into the office for reassessment of the surgery approach as her BMI is in range for surgery  She will also need to sign consents for surgery  I called Denny Montoya and she is agreeable to come inot the office  Appointemnt scheduled on 3/14/16 @ 0945 with MONA Schmid Carla - 14 Mar 2016 2:11 PM     TASK EDITED  Patient was seen today in consultation  Active Problems    1  Acute frontal sinusitis (461 1) (J01 10)   2  Anemia (285 9) (D64 9)   3  Bloating (787 3) (R14 0)   4  BRBPR (bright red blood per rectum) (569 3) (K62 5)   5  Bronchitis (490) (J40)   6  Candidiasis (112 9) (B37 9)   7  Conjunctivitis (372 30) (H10 9)   8  Contraceptives (V25 02)   9  Encounter for preconception consultation (V26 49) (Z31 69)   10  Encounter for routine gynecological examination (V72 31) (Z01 419)   11  Eye irritation (379 99) (H57 8)   12  Fatigue (780 79) (R53 83)   13  Folliculitis (821 5) (Z52 6)   14  Fungal dermatitis (111 9) (B36 9)   15  Hernia, abdominal (553 9) (K46 9)   16  History of allergy (V15 09) (Z88 9)   17  Incisional hernia (553 21) (K43 2)   18  Morbid obesity (278 01) (E66 01)   19  Oral contraceptive prescribed (V25 01) (Z30 011)   20  Pharyngitis (462) (J02 9)   21  Shortness of breath (786 05) (R06 02)   22  Sorethroat (462) (J02 9)   23  URTI (acute upper respiratory infection) (465 9) (J06 9)   24  Viral exanthem (057 9) (B09)   25  Vulvar abscess (616 4) (N76 4)    Current Meds   1  Alaway 0 025 % Ophthalmic Solution; Therapy: 00JSZ9367 to Recorded   2  Clindamycin Phosphate 1 % External Lotion; as directed; Therapy: (Recorded:95Tsb9797) to Recorded   3  Flonase 50 MCG/ACT SUSP (Fluticasone Propionate); Therapy: (Recorded:40Jnz6665) to Recorded   4  Iron 325 (65 Fe) MG Oral Tablet; TAKE 1 TABLET TWICE DAILY WITH MEALS; Therapy: 83BVF2547 to (Evaluate:16Jun2016)  Requested for: 35KOV6605; Last   Rx:22Jun2015 Ordered   5  Nystatin 976889 UNIT/GM External Powder; APPLY 2-3 TIMES DAILY TO AFFECTED   AREA(S); Therapy: 55UXF4381 to (Evaluate:12Apr2016)  Requested for: 70KBX8721; Last   Rx:01Mar2016 Ordered   6  Orsythia 0 1-20 MG-MCG Oral Tablet; Take 1 tablet daily as directed; Therapy: 03PKZ7154 to (Evaluate:05Hzw9527)  Requested for: 11Aug2015; Last   Rx:11Aug2015 Ordered   7  Pantoprazole Sodium 40 MG Oral Tablet Delayed Release; Therapy: 67TFK1973 to Recorded   8  PNV Prenatal Plus Multivitamin 27-1 MG Oral Tablet; take one tablet by mouth one time   daily; Therapy: 62QRX3372 to (Evaluate:16Jun2016)  Requested for: 87XLR4115; Last   Rx:22Jun2015 Ordered   9  Triamcinolone Acetonide 0 1 % External Lotion; Therapy: (Recorded:10Pwb2245) to Recorded   10  ZyrTEC Allergy 10 MG Oral Capsule; 1 tqablet daily; Therapy: (Recorded:83Rgv4901) to Recorded    Allergies    1   Benadryl TABS    Signatures   Electronically signed by : Zahida Manuel, ; Mar 14 2016  2:11PM EST                       (Author)

## 2018-01-14 VITALS
TEMPERATURE: 97.4 F | HEART RATE: 88 BPM | DIASTOLIC BLOOD PRESSURE: 78 MMHG | WEIGHT: 191.38 LBS | SYSTOLIC BLOOD PRESSURE: 132 MMHG | HEIGHT: 59 IN | BODY MASS INDEX: 38.58 KG/M2 | RESPIRATION RATE: 18 BRPM

## 2018-01-14 NOTE — MISCELLANEOUS
Message  Mailed colono letter to patients home address  Mailed letter and pathology to Dr Tal Fournier  Updated pre-visit planning for 3-5 year followup  Active Problems    1  Acute frontal sinusitis (461 1) (J01 10)   2  Anemia (285 9) (D64 9)   3  Bloating (787 3) (R14 0)   4  BRBPR (bright red blood per rectum) (569 3) (K62 5)   5  Bronchitis (490) (J40)   6  Candidiasis (112 9) (B37 9)   7  Conjunctivitis (372 30) (H10 9)   8  Contraceptives (V25 02)   9  Encounter for preconception consultation (V26 49) (Z31 69)   10  Encounter for routine gynecological examination (V72 31) (Z01 419)   11  Eye irritation (379 99) (H57 8)   12  Fatigue (780 79) (R53 83)   13  Folliculitis (052 8) (O65 5)   14  Fungal dermatitis (111 9) (B36 9)   15  Hernia, abdominal (553 9) (K46 9)   16  History of allergy (V15 09) (Z88 9)   17  Incisional hernia (553 21) (K43 2)   18  Morbid obesity (278 01) (E66 01)   19  Oral contraceptive prescribed (V25 01) (Z30 011)   20  Ovarian cyst (620 2) (N83 20)   21  Pharyngitis (462) (J02 9)   22  Shortness of breath (786 05) (R06 02)   23  Sorethroat (462) (J02 9)   24  URTI (acute upper respiratory infection) (465 9) (J06 9)   25  Viral exanthem (057 9) (B09)   26  Vulvar abscess (616 4) (N76 4)    Current Meds   1  Alaway 0 025 % Ophthalmic Solution; Therapy: 05CDY4764 to Recorded   2  Clindamycin Phosphate 1 % External Lotion; as directed; Therapy: (Recorded:59Yfa1508) to Recorded   3  Flonase 50 MCG/ACT Nasal Suspension (Fluticasone Propionate); Therapy: (Recorded:18Sew0974) to Recorded   4  Iron 325 (65 Fe) MG Oral Tablet; TAKE 1 TABLET TWICE DAILY WITH MEALS; Therapy: 21WEV1802 to (Evaluate:16Jun2016)  Requested for: 19HAO6326; Last   Rx:22Jun2015 Ordered   5  Nystatin 050883 UNIT/GM External Powder; APPLY 2-3 TIMES DAILY TO AFFECTED   AREA(S); Therapy: 37JAU6769 to (Evaluate:12Apr2016)  Requested for: 16XDH3810; Last   Rx:01Mar2016 Ordered   6   Orsythia 0 1-20 MG-MCG Oral Tablet; Take 1 tablet daily as directed; Therapy: 71ZZS0158 to (Evaluate:64See4919)  Requested for: 11Aug2015; Last   Rx:11Aug2015 Ordered   7  Pantoprazole Sodium 40 MG Oral Tablet Delayed Release; Therapy: 64AIO3400 to Recorded   8  PNV Prenatal Plus Multivitamin 27-1 MG Oral Tablet; take one tablet by mouth one time   daily; Therapy: 58JIP1583 to (Evaluate:16Jun2016)  Requested for: 21AMB9646; Last   Rx:22Jun2015 Ordered   9  Triamcinolone Acetonide 0 1 % External Lotion; Therapy: (Recorded:80Hof0254) to Recorded   10  ZyrTEC Allergy 10 MG Oral Capsule; 1 tqablet daily; Therapy: (Recorded:34Clm9293) to Recorded    Allergies    1  Benadryl TABS    Plan  BRBPR (bright red blood per rectum)    · COLONOSCOPY ; every 4 weeks;  Last 27NAH3989; Next 08Apr2016; Status:Active    Signatures   Electronically signed by : Jasmina Timmons, ; Mar 22 2016  2:08PM EST                       (Author)

## 2018-01-14 NOTE — MISCELLANEOUS
Message   Recorded as Task   Date: 11/22/2017 01:17 PM, Created By: [facility]   Task Name: Follow Up   Assigned To: [facility],Team   Regarding Patient: Mague Muñiz, Status: Active   CommentKenard Hilt - 22 Nov 2017 1:17 PM     TASK CREATED  Pt calling this am c/o right sided abd pain around incision site from hernia surgery on 10/25  States incision site is healed and no issues noted at the site  No swelling or lumps  No N/V/D and is afebrile  Eating and drinking fluids fine  Is more gassy but is having regular bowel movements  After eating a meal seems to be more when the pain comes on, nothing specific though food wise  Using apap for discomfort which does help  The pain comes and goes and is currently about a 5 on the scale  Spoke w/ Dr Cely King made aware of this and appt made for 11/24 for eval         Active Problems    1  Abdominal hernia without obstruction or gangrene (553 20) (K46 9)   2  Acute frontal sinusitis (461 1) (J01 10)   3  Anemia (285 9) (D64 9)   4  Bloating (787 3) (R14 0)   5  BRBPR (bright red blood per rectum) (569 3) (K62 5)   6  Bronchitis (490) (J40)   7  Burning with urination (788 1) (R30 0)   8  Candidiasis (112 9) (B37 9)   9  Conjunctivitis (372 30) (H10 9)   10  Contraceptives (V25 02)   11  Encounter for preconception consultation (V26 49) (Z31 69)   12  Encounter for routine gynecological examination (V72 31) (Z01 419)   13  Eye irritation (379 99) (H57 8)   14  Fatigue (780 79) (R53 83)   15  Folliculitis (353 6) (K45 6)   16  Fungal dermatitis (111 9) (B36 9)   17  History of allergy (V15 09) (Z88 9)   18  Low back pain (724 2) (M54 5)   19  Morbid obesity (278 01) (E66 01)   20  Oral contraceptive prescribed (V25 01) (Z30 011)   21  Ovarian cyst (620 2) (N83 20)   22  Pharyngitis (462) (J02 9)   23  Request for sterilization (V49 89) (Z78 9)   24  Shortness of breath (786 05) (R06 02)   25  Sorethroat (462) (J02 9)   26   URTI (acute upper respiratory infection) (465 9) (J06 9)   27  Viral exanthem (057 9) (B09)   28  Vulvar abscess (616 4) (N76 4)    Current Meds   1  B-12 CAPS; Therapy: (Recorded:41Uth5394) to Recorded   2  Colace 100 MG Oral Capsule; TAKE 1 CAPSULE TWICE DAILY; Therapy: 12LGH5619 to (Evaluate:2017); Last Rx:2017 Ordered   3  [facility] Iron 325 (65 Fe) MG Oral Tablet; Take 1 tablet twice daily with meals; Therapy: 45VAC4019 to (Evaluate:2017)  Requested for: 91YAJ8732; Last   Rx:2016 Ordered   4  Flonase 50 MCG/ACT SUSP (Fluticasone Propionate); Therapy: (Recorded:64Avv7911) to Recorded   5  Ondansetron 4 MG Oral Tablet Disintegrating; take 1 tablet every 6 hours as needed for   nausea; Therapy: 13NRF9894 to (Last Rx:2017) Ordered   6  Orsythia 0 1-20 MG-MCG Oral Tablet; Take 1 tablet daily as directed; Therapy: 43KFT7025 to (Theta Ted)  Requested for: 2017; Last   L11ADM6526 Ordered   7  Pantoprazole Sodium 40 MG Oral Tablet Delayed Release; Therapy: 69JWD5554 to Recorded   8  ZyrTEC Allergy 10 MG Oral Capsule; 1 tqablet daily; Therapy: (Recorded:56Fio8177) to Recorded    Allergies    1  Benadryl TABS   2  Nystatin Powder   3   Adhesive Tape TAPE    Signatures   Electronically signed by : Marcelo Lewis, ; 2017  1:18PM EST                       (Author)

## 2018-01-15 NOTE — CONSULTS
Assessment    1  Incisional hernia (553 21) (K43 2)   2  BRBPR (bright red blood per rectum) (569 3) (K62 5)   3  Morbid obesity (278 01) (E66 01)    Discussion/Summary  Discussion Summary:   Incisional hernia in the left lower quadrant, status post multiple C-sections and incisional infections  40  Has lost 50 pounds and is on a good program for Weight [employer]  She needs a CAT scan to prove and determine the size and extent of hernia, would also recommend deferring repair as she is on a current weight loss program that is quite successful  Recurrence rate is related to BMI and certainly it would behoove her to have lost as much weight as possible prior to the repair as long as she remains relatively little to manage her symptoms  She is not very symptomatic so I think this would be a good idea  Right red blood per rectum and a significant family history of cancer before the age of 48 in the colon  In schedule colonoscopy  Plan  CT abdomen and pelvis rule out hernia left lower quadrant  Colonoscopy for bleeding per rectum  Chief Complaint  Chief Complaint Free Text Note Form: Patient here today for a consultation for a lump in the LLQ abdomen  She noted this since she lost 50 pounds intentionally since October 2015  fever/chills - denies   nausea - from one of her medications  /vomiting - denies  pain - feels tender and swollen, but no pain  bowels - regular pattern, occasional blood on paper when constipated, or melena  studies - none  patient education - hernia handout given         History of Present Illness  HPI: Patient is a 28year old female that presents to the office today for initial consult of a hernia of the left lower quadrant  Patient states that she noticed a palpable lump approximately 2 weeks ago while bearing down in the bathroom  Patient states the pain feels like "she is on her period" constantly, especially exaggerated with lifting, bending and coughing   Patient has had recent bronchitis as well  She has history of 3 cesarian sections prior to this, with the last one being 5 years ago  She is currently experiencing yeast infection and is applying nystatin cream to the Caesarian scar  During her last , she had complications with incision healing for several months after the surgery  She has recently lost 50 lbs through weight watchers since October  Patient has had scant amount of bright red blood intermittently in stool and denies hx of hemorrhoids  Patients grandmother had colon cancer  She denies smoking or diabetes    Multiple C-sections and multiple incisional hernias  Now and has noticed a lump when she strains in the left lower quadrant  It is reducible  Also had a maternal grandmother who had colon cancer and  before the age of 36  Family history of colon cancer or uterine cancer  When she does have occasional bright red blood per rectum  As often when she wipes but also in the toilet or stool  Review of Systems  Complete-Female:   Constitutional: as noted in HPI, no fever and no chills  Eyes: as noted in HPI    ENT: as noted in HPI  Cardiovascular: as noted in HPI, no chest pain and no palpitations  Gastrointestinal: bloody stools, but as noted in HPI, no nausea, no vomiting, no constipation and no diarrhea  Genitourinary: as noted in HPI  Musculoskeletal: as noted in HPI, no joint swelling and no limb pain  Integumentary: a rash, but as noted in HPI  Neurological: no headache, no numbness and no tingling  Active Problems    1  Acute frontal sinusitis (461 1) (J01 10)   2  Anemia (285 9) (D64 9)   3  Bloating (787 3) (R14 0)   4  Bronchitis (490) (J40)   5  Candidiasis (112 9) (B37 9)   6  Conjunctivitis (372 30) (H10 9)   7  Contraceptives (V25 02)   8  Encounter for preconception consultation (V26 49) (Z31 69)   9  Encounter for routine gynecological examination (V72 31) (Z01 419)   10  Eye irritation (379 99) (H57 8)   11   Fatigue (780 79) (R53 83)   12  Folliculitis (799 8) (D09 7)   13  Fungal dermatitis (111 9) (B36 9)   14  Hernia, abdominal (553 9) (K46 9)   15  History of allergy (V15 09) (Z88 9)   16  Oral contraceptive prescribed (V25 01) (Z30 011)   17  Pharyngitis (462) (J02 9)   18  Shortness of breath (786 05) (R06 02)   19  Sorethroat (462) (J02 9)   20  URTI (acute upper respiratory infection) (465 9) (J06 9)   21  Viral exanthem (057 9) (B09)   22  Vulvar abscess (616 4) (N76 4)    Past Medical History    1  Acute sinusitis (461 9) (J01 90)   2  History of gastroesophageal reflux (GERD) (V12 79) (Z87 19)   3  History of seasonal allergies (V15 09) (Z88 9)   4  Oral contraceptive prescribed (V25 01) (Z30 011)  Active Problems And Past Medical History Reviewed: The active problems and past medical history were reviewed and updated today  Surgical History    1  History of  Section   2  History of Cholecystectomy   3  Contraceptives (V25 02)  Surgical History Reviewed: The surgical history was reviewed and updated today  Family History    1  Family history of skin cancer (V16 8) (Z80 8)    2  Family history of Diabetes Mellitus (V18 0)   3  Family history of Kidney Cancer (V16 51)    4  Family history of colon cancer (V16 0) (Z80 0)  Family History Reviewed: The family history was reviewed and updated today  Social History    · Death Of A Fetus   · Never A Smoker   · Never Drank Alcohol   · No drug use  Social History Reviewed: The social history was reviewed and updated today  The social history was reviewed and is unchanged  Current Meds   1  Alaway 0 025 % Ophthalmic Solution; Therapy: 88NKX8587 to Recorded   2  Clindamycin Phosphate 1 % External Lotion; as directed; Therapy: (Recorded:83Bno3775) to Recorded   3  DrRx Mycolog II Cream 15 Grams; apply to area bid for 2 weeks; Therapy: 12RWA1990 to (Evaluate:2016); Last Rx:24Sul8609 Ordered   4  Flonase 50 MCG/ACT SUSP;    Therapy: (Recorded:86Zoz7362) to Recorded   5  Iron 325 (65 Fe) MG Oral Tablet; TAKE 1 TABLET TWICE DAILY WITH MEALS; Therapy: 85FLM5927 to (Evaluate:16Jun2016)  Requested for: 63HQY4784; Last   Rx:22Jun2015 Ordered   6  Orsythia 0 1-20 MG-MCG Oral Tablet; Take 1 tablet daily as directed; Therapy: 16NJN2295 to (Evaluate:08Jam2573)  Requested for: 11Aug2015; Last   Rx:11Aug2015 Ordered   7  Pantoprazole Sodium 40 MG Oral Tablet Delayed Release; Therapy: 23QAY9176 to Recorded   8  PNV Prenatal Plus Multivitamin 27-1 MG Oral Tablet; take one tablet by mouth one time   daily; Therapy: 05LJO2058 to (Evaluate:16Jun2016)  Requested for: 26CBP7378; Last   Rx:22Jun2015 Ordered   9  Triamcinolone Acetonide 0 1 % External Lotion; Therapy: (Recorded:17Qug3162) to Recorded   10  ZyrTEC Allergy 10 MG Oral Capsule; 1 tqablet daily; Therapy: (Recorded:06Cjj9733) to Recorded  Medication List Reviewed: The medication list was reviewed and updated today  Allergies    1  Benadryl TABS    Vitals  Vital Signs [Data Includes: Current Encounter]    Recorded: 12Elw2725 01:47PM   Temperature 98 7 F, Tympanic   Heart Rate 76, L Radial   Pulse Quality Regular, L Radial   Respiration 18   Systolic 271, LUE, Sitting   Diastolic 70, LUE, Sitting   BP Cuff Size Large   Height 4 ft 11 in   Weight 201 lb 9 6 oz   BMI Calculated 40 72   BSA Calculated 1 85     Physical Exam    Constitutional   General appearance: No acute distress, well appearing and well nourished  Eyes   Conjunctiva and lids: No swelling, erythema or discharge  Ears, Nose, Mouth, and Throat   External inspection of ears and nose: Normal     Neck   Supple, symmetric, trachea midline, no masses   Pulmonary   Respiratory effort: No increased work of breathing or signs of respiratory distress  Auscultation of lungs: Clear to auscultation, equal breath sounds bilaterally, no wheezes, no rales, no rhonci      Cardiovascular   Auscultation of heart: Normal rate and rhythm, normal S1 and S2, without murmurs  Examination of extremities for edema and/or varicosities: Normal     Abdomen   Abdomen: Abnormal   palpable reducible mass left lower quadrant, likley incisional from cesarian  Approximately 6 cm in diameter  Musculoskeletal   Gait and station: Normal     Skin   Skin and subcutaneous tissue: Abnormal   superficial yeast infection overlying Caesarian scar  Neurologic   Cranial nerves: Cranial nerves 2-12 intact  Psychiatric   Orientation to person, place, and time: Normal           Provider Comments  Provider Comments: This report has been generated by a voice recognition software system  Therefore, there may be syntax, spelling, and/or grammatical errors  Please call if you've any questions  Consent was discussed with the patient at length  Full discussion regarding risks, benefits, and alternatives  Risks include bleeding, injury to internal organs, and perforation, which would require surgery for repair  A colon perforation may result in eventually an ostomy if necessary for repair  The complications may be identified at the time of procedure or delayed identification  There is also a certain percentage of patients who have missed polyps, polyps not seen on the original colonoscopy  This is an inherent risk of the procedure  Patient is aware of the risks, benefits, and alternatives, and agrees to surgery  Attending Note  Attending Note: Attending Note: I interviewed and examined the patient and I agree with the Resident management plan as it was presented to me  Level of Participation: I was present in clinic and examined the patient  Patient's History: I have personally seen, evaluated, and examined this patient  I discussed the case with the resident and the resident's evaluation, who was present  I agree with the resident's findings as above or have modified it, as I note in my discussion comments  LINDA Wheeler , FACS        Future Appointments    Date/Time Provider Specialty Site   06/23/2016 10:30 AM LINDA Hall   Obstetrics/Gynecology [facility]     Signatures   Electronically signed by : Mira Pedraza MD; Feb 29 2016  2:47PM EST                       (Author)    Electronically signed by : Mira Pedraza MD; Feb 29 2016  2:50PM EST                       (Author)

## 2018-01-16 NOTE — MISCELLANEOUS
Message  returned pt's call  LMOV -told her to d/c mycolog cream which is not working for her and start nystatin powder  Pt will call if sxs not improving w/in a week  Plan  Candidiasis    · Nystatin 426196 UNIT/GM External Powder; APPLY 2-3 TIMES DAILY TO  AFFECTED AREA(S)  Incisional hernia    · CT ABDOMEN PELVIS W WO CONTRAST; Status:Need Information - Financial  Authorization; Requested for:09Mar2016 1:00pm [place];     Signatures   Electronically signed by :  INDER Ross; Mar  1 2016  9:37AM EST                       (Author)

## 2018-01-16 NOTE — RESULT NOTES
Verified Results  (1) URINALYSIS (will reflex a microscopy if leukocytes, occult blood, protein or nitrites are not within normal limits) 25Sep2017 01:33PM Kylee Gracia     Test Name Result Flag Reference   COLOR Yellow     CLARITY Clear     SPECIFIC GRAVITY UA 1 034 H 1 003-1 030   PH UA 6 0  4 5-8 0   LEUKOCYTE ESTERASE UA Negative  Negative   NITRITE UA Negative  Negative   PROTEIN UA Negative mg/dl  Negative   GLUCOSE UA Negative mg/dl  Negative   KETONES UA Negative mg/dl  Negative   UROBILINOGEN UA 1 0 E U /dl  0 2, 1 0 E U /dl   BILIRUBIN UA Negative  Negative   BLOOD UA Negative  Negative     (1) URINE CULTURE 25Sep2017 01:33PM Kylee Garcia     Test Name Result Flag Reference   CLINICAL REPORT (Report)     Test:        Urine culture  Specimen Source:  Urine, Other  Specimen Type:   Urine  Specimen Date:   9/25/2017 1:33 PM  Result Date:    9/26/2017 4:01 PM  Result Status:   Final result  Resulting Lab:   [place] [address]            Tel: [phone]      CULTURE                                       ------------------                                   80,000-89,000 cfu/ml Mixed Contaminants X3

## 2018-01-18 NOTE — RESULT NOTES
Verified Results  * US PELVIS COMPLETE (TRANSABDOMINAL AND TRANSVAGINAL) 19KFW1903 01:09PM Ivania Valdovinos     Test Name Result Flag Reference   US PELVIS COMPLETE (TRANSABDOMINAL AND TRANSVAGINAL) (Report)     PELVIC ULTRASOUND, COMPLETE     INDICATION: Ovarian cyst seen on CT           COMPARISON: CT 3/9/2016  TECHNIQUE:  Transabdominal pelvic ultrasound was performed in sagittal and transverse planes with a curvilinear transducer  Additional transvaginal imaging was performed to better evaluate the endometrium and ovaries  Imaging included volumetric    sweeps as well as traditional still imaging technique  FINDINGS:     UTERUS:   The uterus is anteverted in position, measuring 9 2 x 4 4 x 4 8 cm  Contour and echotexture appear normal      The cervix shows no suspicious abnormality  ENDOMETRIUM:    Normal caliber of 9 mm  Fluid is present within the endometrium, patient is currently bleeding  OVARIES/ADNEXA:   Right ovary: 3 7 x 2 1 x 3 2 cm  No suspicious right ovarian abnormality  Doppler flow within normal limits  Left ovary: 3 2 x 1 6 x 3 4 cm  No suspicious left ovarian abnormality  Doppler flow within normal limits  No suspicious adnexal mass or loculated collections  There is no free fluid  IMPRESSION:      Endometrial fluid, patient currently bleeding, otherwise unremarkable study  Right ovarian cyst seen on CT has resolved  Workstation performed: [id number]     Signed by:    Celia Barger MD   3/22/16

## 2018-01-22 VITALS
WEIGHT: 200 LBS | BODY MASS INDEX: 40.32 KG/M2 | DIASTOLIC BLOOD PRESSURE: 72 MMHG | HEIGHT: 59 IN | SYSTOLIC BLOOD PRESSURE: 110 MMHG

## 2018-02-14 ENCOUNTER — OPTICAL OFFICE (OUTPATIENT)
Dept: URBAN - METROPOLITAN AREA CLINIC 143 | Facility: CLINIC | Age: 38
Setting detail: OPHTHALMOLOGY
End: 2018-02-14
Payer: COMMERCIAL

## 2018-02-14 ENCOUNTER — DOCTOR'S OFFICE (OUTPATIENT)
Dept: URBAN - METROPOLITAN AREA CLINIC 136 | Facility: CLINIC | Age: 38
Setting detail: OPHTHALMOLOGY
End: 2018-02-14
Payer: COMMERCIAL

## 2018-02-14 DIAGNOSIS — H52.223: ICD-10-CM

## 2018-02-14 DIAGNOSIS — H17.822: ICD-10-CM

## 2018-02-14 DIAGNOSIS — H52.03: ICD-10-CM

## 2018-02-14 DIAGNOSIS — H04.123: ICD-10-CM

## 2018-02-14 PROCEDURE — 92014 COMPRE OPH EXAM EST PT 1/>: CPT | Performed by: OPTOMETRIST

## 2018-02-14 PROCEDURE — V2020 VISION SVCS FRAMES PURCHASES: HCPCS | Performed by: OPTOMETRIST

## 2018-02-14 PROCEDURE — V2750 ANTI-REFLECTIVE COATING: HCPCS | Performed by: OPTOMETRIST

## 2018-02-14 PROCEDURE — V2103 SPHEROCYLINDR 4.00D/12-2.00D: HCPCS | Performed by: OPTOMETRIST

## 2018-02-14 ASSESSMENT — REFRACTION_MANIFEST
OU_VA: 20/
OS_VA1: 20/
OD_VA3: 20/
OD_VA2: 20/
OS_VA1: 20/
OD_VA1: 20/
OD_VA3: 20/
OS_VA2: 20/
OU_VA: 20/
OD_VA1: 20/
OD_VA2: 20/
OS_VA2: 20/
OS_VA3: 20/
OS_VA3: 20/

## 2018-02-14 ASSESSMENT — AXIALLENGTH_DERIVED
OS_AL: 22.6105
OD_AL: 22.7003

## 2018-02-14 ASSESSMENT — REFRACTION_AUTOREFRACTION
OS_AXIS: 092
OD_SPHERE: +1.25
OS_SPHERE: +1.25
OS_CYLINDER: -1.25
OD_CYLINDER: -1.75
OD_AXIS: 106

## 2018-02-14 ASSESSMENT — SPHEQUIV_DERIVED
OD_SPHEQUIV: 0.375
OS_SPHEQUIV: 0.625

## 2018-02-14 ASSESSMENT — REFRACTION_OUTSIDERX
OD_VA1: 20/20
OS_VA3: 20/
OD_AXIS: 105
OD_VA2: 20/20
OS_VA2: 20/20
OU_VA: 20/20
OS_AXIS: 085
OS_CYLINDER: -1.00
OD_CYLINDER: -1.25
OD_SPHERE: +1.25
OS_VA1: 20/20
OS_SPHERE: +1.25
OD_VA3: 20/

## 2018-02-14 ASSESSMENT — REFRACTION_CURRENTRX
OS_CYLINDER: -0.75
OS_SPHERE: +1.00
OD_OVR_VA: 20/
OD_OVR_VA: 20/
OS_VPRISM_DIRECTION: SV
OS_OVR_VA: 20/
OS_AXIS: 083
OD_AXIS: 106
OD_VPRISM_DIRECTION: SV
OD_OVR_VA: 20/
OD_CYLINDER: -1.25
OS_OVR_VA: 20/
OD_SPHERE: +1.00
OS_OVR_VA: 20/

## 2018-02-14 ASSESSMENT — TEAR BREAK UP TIME (TBUT)
OD_TBUT: 5S
OS_TBUT: 5S

## 2018-02-14 ASSESSMENT — SUPERFICIAL PUNCTATE KERATITIS (SPK)
OD_SPK: T 1+
OS_SPK: T 1+

## 2018-02-14 ASSESSMENT — KERATOMETRY
OD_AXISANGLE_DEGREES: 119
OS_K2POWER_DIOPTERS: 46.00
OS_K1POWER_DIOPTERS: 45.25
OD_K2POWER_DIOPTERS: 45.75
OD_K1POWER_DIOPTERS: 45.50
OS_AXISANGLE_DEGREES: 078

## 2018-02-14 ASSESSMENT — CONFRONTATIONAL VISUAL FIELD TEST (CVF)
OD_FINDINGS: FULL
OS_FINDINGS: FULL

## 2018-02-14 ASSESSMENT — DECREASING TEAR LAKE - SEVERITY SCORE
OD_DEC_TEARLAKE: 1+
OS_DEC_TEARLAKE: 1+

## 2018-02-14 ASSESSMENT — VISUAL ACUITY
OD_BCVA: 20/25-1
OS_BCVA: 20/25-1

## 2018-03-19 NOTE — MISCELLANEOUS
Message      Recorded as Task   Date: 03/12/2016 08:56 PM, Created By: Tiffanie Arita   Task Name: Go to Result   Assigned To: [facility],Team   Regarding Patient: Kayleen Pepe, Status: Active   Comment:    Tiffanie Arita - 12 Mar 2016 8:56 PM     TASK CREATED  see in office to discuss hernia AND to GYN to follow up on ovarian cyst found on CT  Henny Stern - 15 Mar 2016 3:52 PM     TASK EDITED  Patient was seen in the office on 3/14/16 to discuss the hernia  At that time she was informed of the incidental finding of the ovarian cyst   MONA Morales advises an appointment with GYN within the month  Patient called asking for a time frame to schedule the appointment in  She was told within 1 month  MONA Morales tasked Dr Thiago Blake for an appointment  [address] [facility] is eagerly anticipating the call to schedule this appointment  Active Problems    1  Acute frontal sinusitis (461 1) (J01 10)   2  Anemia (285 9) (D64 9)   3  Bloating (787 3) (R14 0)   4  BRBPR (bright red blood per rectum) (569 3) (K62 5)   5  Bronchitis (490) (J40)   6  Candidiasis (112 9) (B37 9)   7  Conjunctivitis (372 30) (H10 9)   8  Contraceptives (V25 02)   9  Encounter for preconception consultation (V26 49) (Z31 69)   10  Encounter for routine gynecological examination (V72 31) (Z01 419)   11  Eye irritation (379 99) (H57 8)   12  Fatigue (780 79) (R53 83)   13  Folliculitis (989 8) (P43 3)   14  Fungal dermatitis (111 9) (B36 9)   15  Hernia, abdominal (553 9) (K46 9)   16  History of allergy (V15 09) (Z88 9)   17  Incisional hernia (553 21) (K43 2)   18  Morbid obesity (278 01) (E66 01)   19  Oral contraceptive prescribed (V25 01) (Z30 011)   20  Pharyngitis (462) (J02 9)   21  Shortness of breath (786 05) (R06 02)   22  Sorethroat (462) (J02 9)   23  URTI (acute upper respiratory infection) (465 9) (J06 9)   24  Viral exanthem (057 9) (B09)   25  Vulvar abscess (616 4) (N76 4)    Current Meds   1  Alaway 0 025 % Ophthalmic Solution; Therapy: 63LTN5882 to Recorded   2  Clindamycin Phosphate 1 % External Lotion; as directed; Therapy: (Recorded:69Ibt8323) to Recorded   3  Flonase 50 MCG/ACT SUSP (Fluticasone Propionate); Therapy: (Recorded:33Vgj9952) to Recorded   4  Iron 325 (65 Fe) MG Oral Tablet; TAKE 1 TABLET TWICE DAILY WITH MEALS; Therapy: 87NEX0509 to (Evaluate:16Jun2016)  Requested for: 46UKP4919; Last   Rx:22Jun2015 Ordered   5  Nystatin 872594 UNIT/GM External Powder; APPLY 2-3 TIMES DAILY TO AFFECTED   AREA(S); Therapy: 70OGU2746 to (Evaluate:12Apr2016)  Requested for: 68KHB8598; Last   Rx:01Mar2016 Ordered   6  Orsythia 0 1-20 MG-MCG Oral Tablet; Take 1 tablet daily as directed; Therapy: 29DUP7654 to (Evaluate:40Vla8697)  Requested for: 11Aug2015; Last   Rx:11Aug2015 Ordered   7  Pantoprazole Sodium 40 MG Oral Tablet Delayed Release; Therapy: 28MCI3784 to Recorded   8  PNV Prenatal Plus Multivitamin 27-1 MG Oral Tablet; take one tablet by mouth one time   daily; Therapy: 59FSU0565 to (Evaluate:16Jun2016)  Requested for: 13UIN4394; Last   Rx:22Jun2015 Ordered   9  Triamcinolone Acetonide 0 1 % External Lotion; Therapy: (Recorded:32Mrs2716) to Recorded   10  ZyrTEC Allergy 10 MG Oral Capsule; 1 tqablet daily; Therapy: (Recorded:25Asd1428) to Recorded    Allergies    1   Benadryl TABS    Signatures   Electronically signed by : Anthony Goetz, ; Mar 15 2016  3:53PM EST                       (Author) Biopsy Method: Personna blade Anesthesia Volume In Cc (Will Not Render If 0): 0.5 Cryotherapy Text: The wound bed was treated with cryotherapy after the biopsy was performed. Notification Instructions: Patient will be notified of biopsy results. However, patient instructed to call the office if not contacted within 2 weeks. Anesthesia Type: 1% lidocaine with epinephrine Biopsy Type: H and E Detail Level: Detailed Lab: [facility] X Size Of Lesion In Cm: 0 Dressing: pressure dressing with telfa Consent: Written consent was obtained and risks were reviewed including but not limited to scarring, infection, bleeding, scabbing, incomplete removal, nerve damage and allergy to anesthesia. Hemostasis: Pressure Size Of Lesion In Cm: 0.9 Destruction After The Procedure: No Silver Nitrate Text: The wound bed was treated with silver nitrate after the biopsy was performed. Render Post-Care Instructions In Note?: yes Type Of Destruction Used: Curettage Electrodesiccation Text: The wound bed was treated with electrodesiccation after the biopsy was performed. Electrodesiccation And Curettage Text: The wound bed was treated with electrodesiccation and curettage after the biopsy was performed. Wound Care: [facility] Lab Facility: [address] [facility] Billing Type: [employer]

## 2018-04-10 ENCOUNTER — DOCUMENTATION (OUTPATIENT)
Dept: SURGERY | Facility: CLINIC | Age: 38
End: 2018-04-10

## 2018-04-10 RX ORDER — SUCRALFATE 1 G/10ML
SUSPENSION ORAL
Refills: 0 | COMMUNITY
Start: 2018-04-06 | End: 2018-11-24 | Stop reason: ALTCHOICE

## 2018-04-10 RX ORDER — PANTOPRAZOLE SODIUM 40 MG/1
TABLET, DELAYED RELEASE ORAL
COMMUNITY
Start: 2015-06-05 | End: 2018-04-10 | Stop reason: SDUPTHER

## 2018-04-10 RX ORDER — LEVONORGESTREL AND ETHINYL ESTRADIOL 0.1-0.02MG
KIT ORAL
COMMUNITY
Start: 2015-09-28 | End: 2018-07-03 | Stop reason: SDUPTHER

## 2018-04-10 RX ORDER — FERROUS SULFATE 325(65) MG
TABLET ORAL
COMMUNITY
Start: 2015-05-28 | End: 2018-11-24 | Stop reason: ALTCHOICE

## 2018-04-13 ENCOUNTER — OFFICE VISIT (OUTPATIENT)
Dept: SURGERY | Facility: CLINIC | Age: 38
End: 2018-04-13
Payer: COMMERCIAL

## 2018-04-13 VITALS
SYSTOLIC BLOOD PRESSURE: 130 MMHG | WEIGHT: 207 LBS | RESPIRATION RATE: 16 BRPM | TEMPERATURE: 98.9 F | DIASTOLIC BLOOD PRESSURE: 70 MMHG | BODY MASS INDEX: 41.73 KG/M2 | HEART RATE: 80 BPM | HEIGHT: 59 IN

## 2018-04-13 DIAGNOSIS — R10.9 ABDOMINAL PAIN, UNSPECIFIED ABDOMINAL LOCATION: Primary | ICD-10-CM

## 2018-04-13 DIAGNOSIS — E66.9 OBESITY (BMI 35.0-39.9 WITHOUT COMORBIDITY): Chronic | ICD-10-CM

## 2018-04-13 PROBLEM — K43.2 INCISIONAL HERNIA OF ANTERIOR ABDOMINAL WALL WITHOUT OBSTRUCTION OR GANGRENE: Status: RESOLVED | Noted: 2017-10-25 | Resolved: 2018-04-13

## 2018-04-13 PROCEDURE — 99214 OFFICE O/P EST MOD 30 MIN: CPT | Performed by: SURGERY

## 2018-04-13 RX ORDER — KETOTIFEN FUMARATE 0.25 MG/ML
SOLUTION/ DROPS OPHTHALMIC
COMMUNITY
Start: 2018-03-26

## 2018-04-13 RX ORDER — ALBUTEROL SULFATE 2.5 MG/3ML
2.5 SOLUTION RESPIRATORY (INHALATION) EVERY 6 HOURS
COMMUNITY
Start: 2018-02-16 | End: 2019-02-16

## 2018-04-13 RX ORDER — HYDROXYZINE HYDROCHLORIDE 10 MG/1
10 TABLET, FILM COATED ORAL 3 TIMES DAILY
COMMUNITY
Start: 2018-04-03 | End: 2018-11-24 | Stop reason: ALTCHOICE

## 2018-04-13 RX ORDER — FAMOTIDINE 20 MG/1
TABLET, FILM COATED ORAL
COMMUNITY
Start: 2018-04-04 | End: 2018-11-24 | Stop reason: ALTCHOICE

## 2018-04-13 RX ORDER — PANTOPRAZOLE SODIUM 40 MG/1
TABLET, DELAYED RELEASE ORAL
COMMUNITY
Start: 2018-02-21 | End: 2018-04-13 | Stop reason: SDUPTHER

## 2018-04-13 RX ORDER — MONTELUKAST SODIUM 10 MG/1
TABLET ORAL
COMMUNITY
Start: 2018-04-03 | End: 2018-11-24 | Stop reason: ALTCHOICE

## 2018-04-13 NOTE — LETTER
April 13, 2018     Ge Jim DO  [address]   [address]  [place] [address]    Patient: Flaquita Ventura   YOB: 1980   Date of Visit: 4/13/2018       Dear Dr Carlos Hoover: Thank you for referring Edmund Ibanez to me for evaluation  Below are my notes for this consultation  If you have questions, please do not hesitate to call me  I look forward to following your patient along with you  Sincerely,        Cayla Nolan MD        CC: DO Suzan Lozano PA-C  4/13/2018 12:38 PM  Sign at close encounter  Assessment/Plan:   Flaquita Ventura is a 40 y  o female who is here for The encounter diagnosis was Abdominal pain, unspecified abdominal location  Patient with right upper quadrant abdominal pain that radiates to flank  History of laparoscopic cholecystectomy and incisional hernia repair laparoscopically    Concern for mesh infection or allergic reaction to mesh due to hypersensitivity to multiple things    On PE:  Right upper quadrant pain along right upper port incision site no defect noted  Fascial edge is not palpable due to obesity  Small eschars on abdominal wall  No erythema or edema  Plan:  CT scan abdomen and pelvis to evaluate mesh and right upper quadrant pain  I doubt mesh infection or allergic reaction to mesh  In review of leukocytosis she has had a multiple slightly elevated leukocytosis that normalizes since 2013  Due to history of asthma and chronic allergic rhinitis patient has underlying inflammatory disease processes  Right upper quadrant pain may be related to sphincter of Oddi dysfunction  Patient was prescribed Bentyl which she has not tried  Preoperative Clearance: None        ______________________________________________________  CC: Allergic Reaction (10/25 Abd hernia repair w/ mesh, has a few red areas on abd )    HPI:  Flaquita Ventura is a 40 y  o female who was referred for evaluation of Allergic Reaction (10/25 Abd hernia repair w/ mesh, has a few red areas on abd )    Patient with right upper quadrant about abdominal pain worse with food  Associated symptoms of nausea  Patient with significant history of acid reflux in which she is on medication for  Patient has history of laparoscopic cholecystectomy  Patient also with history of a laparoscopic ventral incisional hernia repair from   Patient concerned about allergic reaction to mesh due to her hypersensitivity to multiple things and due to red areas on abdominal wall  Also history of leukocytosis mild  Patient with past medical history of allergic rhinitis and asthma  Patient has been seen by family physician and Hematology Oncology for this  Currently abdominal pain  ROS:  General ROS: negative  negative for - chills, fatigue, fever or night sweats, weight loss  Respiratory ROS: no cough, shortness of breath, or wheezing  Cardiovascular ROS: no chest pain or dyspnea on exertion  Genito-Urinary ROS: no dysuria, trouble voiding, or hematuria  Musculoskeletal ROS: negative for - gait disturbance, joint pain or muscle pain  Neurological ROS: no TIA or stroke symptoms  reflux symptoms and abdominal pain and see HPI  Skin ROS: no new rashes or lesions   Lymphatic ROS: no new adenopathy noted by pt  GYN ROS: see HPI, no new GYN history or bleeding noted  Psy ROS: no new mental or behavioral disturbances       Patient Active Problem List   Diagnosis    Incisional hernia of anterior abdominal wall without obstruction or gangrene    Obesity (BMI 35 0-39 9 without comorbidity)         Allergies:  Advil sinus congestion & pain [phenylephrine-ibuprofen]; Ibuprofen; Benadryl [diphenhydramine];  Nystatin; and Wound dressings      Current Outpatient Prescriptions:     acetaminophen (TYLENOL) 500 mg tablet, Take 500 mg by mouth every 6 (six) hours as needed for mild pain, Disp: , Rfl:     ALAWAY 0 025 % ophthalmic solution, , Disp: , Rfl:     albuterol (2 5 mg/3 mL) 0 083 % nebulizer solution, Inhale 2 5 mg every 6 (six) hours, Disp: , Rfl:     albuterol (PROVENTIL HFA,VENTOLIN HFA) 90 mcg/act inhaler, Inhale 2 puffs every 6 (six) hours as needed for wheezing, Disp: , Rfl:     CARAFATE 1 GM/10ML suspension, TAKE 10 ML 3 TIMES A DAY, Disp: , Rfl: 0    cetirizine (ZyrTEC) 10 mg tablet, Take 10 mg by mouth daily  , Disp: , Rfl:     clindamycin (CLEOCIN T) 1 % lotion, Apply topically 2 (two) times a day , Disp: , Rfl:     cyanocobalamin (VITAMIN B-12) 1,000 mcg tablet, Take 1,000 mcg by mouth daily  , Disp: , Rfl:     famotidine (PEPCID) 20 mg tablet, , Disp: , Rfl:     ferrous sulfate 325 (65 Fe) mg tablet, TAKE 1 TABLET TWICE A DAY, Disp: , Rfl:     fluticasone (FLONASE) 50 mcg/act nasal spray, 1 spray into each nostril daily  , Disp: , Rfl:     hydrOXYzine HCL (ATARAX) 10 mg tablet, Take 10 mg by mouth Three times a day, Disp: , Rfl:     levonorgestrel-ethinyl estradiol (ORSYTHIA) 0 1-20 MG-MCG per tablet, , Disp: , Rfl:     montelukast (SINGULAIR) 10 mg tablet, TAKE 1 TABLET BY MOUTH EVERY DAY, Disp: , Rfl:     Triamcinolone Acetonide (KENALOG EX), Apply topically 2 (two) times a day For neck - dermatitis, Disp: , Rfl:     HYDROcodone-acetaminophen (NORCO) 5-325 mg per tablet, Take 1 tablet by mouth every 4 (four) hours as needed for pain for up to 30 doses Max Daily Amount: 6 tablets, Disp: 30 tablet, Rfl: 0    hydrOXYzine HCL (ATARAX) 10 mg tablet, Take 25 mg by mouth every 6 (six) hours as needed for itching, Disp: , Rfl:     hydrOXYzine HCL (ATARAX) 25 mg tablet, Take 1 tablet by mouth every 6 (six) hours as needed for itching, Disp: 30 tablet, Rfl: 0    ibuprofen (MOTRIN) 400 mg tablet, Take by mouth every 6 (six) hours as needed for mild pain, Disp: , Rfl:     Levonorgestrel-Ethinyl Estrad (ORSYTHIA PO), Take 1 tablet by mouth daily at bedtime  , Disp: , Rfl:     pantoprazole (PROTONIX) 40 mg tablet, Take 40 mg by mouth every morning  , Disp: , Rfl:     Past Medical History:   Diagnosis Date    Abdominal hernia     Anemia     Anxiety     Asthma     Dermatitis     On neck    GERD (gastroesophageal reflux disease)     Hypertension     History of- no longer on medication    Obesity     Reflux esophagitis     Seasonal allergies     Wears partial dentures     Top       Past Surgical History:   Procedure Laterality Date     SECTION      x3    CHOLECYSTECTOMY      Laparoscopic    COLONOSCOPY N/A 3/11/2016    Procedure: COLONOSCOPY;  Surgeon: Bill Leigh MD;  Location: [facility]; Service:     [place] LAP,DIAGNOSTIC ABDOMEN N/A 10/25/2017    Procedure: LAPAROSCOPY DIAGNOSTIC, HERNIA REPAIR WITH MESH  WITH LYSIS ADHESIONS;  Surgeon: Bill Leigh MD;  Location: [facility];  Service: General       No family history on file  reports that she has never smoked  She has never used smokeless tobacco  She reports that she does not drink alcohol or use drugs  Labs:   No results found for: WBC, HGB, HCT, MCV, PLT  No results found for: NA, K, CL, CO2, ANIONGAP, BUN, CREATININE, GLUCOSE, GLUF, CALCIUM, CORRECTEDCA, AST, ALT, ALKPHOS, PROT, ALBUMIN, BILITOT, EGFR      Imaging: No new pertinent imaging studies  PHYSICAL EXAM  General Appearance:    Alert, cooperative, no distress   Head:    Normocephalic without obvious abnormality   Eyes:    PERRL, conjunctiva/corneas clear, EOM's intact        Neck:   Supple, no adenopathy, no JVD   Back:     Symmetric, no spinal or CVA tenderness   Lungs:     Clear to auscultation bilaterally, no wheezing or rhonchi   Heart:    Regular rate and rhythm, S1 and S2 normal, no murmur   Abdomen:    soft, nondistended, +Tender RUQ incision  , +BS, Lower csection incision without erythema , edema or fluctuance   Extremities:   Extremities normal  No clubbing, cyanosis or edema   Psych:   Normal Affect, AOx3  Neurologic:  Skin:   CNII-XII intact   Strength symmetric, speech intact    Warm, dry, small open wounds without surrounding erythema or edema                       Some portions of this record may have been generated with voice recognition software  There may be translation, syntax,  or grammatical errors  Occasional wrong word or "sound-a-like" substitutions may have occurred due to the inherent limitations of the voice recognition software  Read the chart carefully and recognize, using context, where substitutions may have occurred  If you have any questions, please contact the dictating provider for clarification or correction, as needed  This encounter has been coded by a non-certified coder         Keisha Dominguez MD    Date: 4/13/2018 Time: 12:23 PM

## 2018-04-13 NOTE — PROGRESS NOTES
Assessment/Plan:   Kiko Manuel is a 40 y  o female who is here for The encounter diagnosis was Abdominal pain, unspecified abdominal location  Patient with right upper quadrant abdominal pain that radiates to flank  History of laparoscopic cholecystectomy and incisional hernia repair laparoscopically    Concern for mesh infection or allergic reaction to mesh due to hypersensitivity to multiple things    On PE:  Right upper quadrant pain along right upper port incision site no defect noted  Fascial edge is not palpable due to obesity  Small eschars on abdominal wall  No erythema or edema  Plan:  CT scan abdomen and pelvis to evaluate mesh and right upper quadrant pain  I doubt mesh infection or allergic reaction to mesh  In review of leukocytosis she has had a multiple slightly elevated leukocytosis that normalizes since   Due to history of asthma and chronic allergic rhinitis patient has underlying inflammatory disease processes  Right upper quadrant pain may be related to sphincter of Oddi dysfunction  Patient was prescribed Bentyl which she has not tried  Preoperative Clearance: None        ______________________________________________________  CC: Allergic Reaction (10/25 Abd hernia repair w/ mesh, has a few red areas on abd )    HPI:  Kiko Manuel is a 40 y  o female who was referred for evaluation of Allergic Reaction (10/25 Abd hernia repair w/ mesh, has a few red areas on abd )    Patient with right upper quadrant about abdominal pain worse with food  Associated symptoms of nausea  Patient with significant history of acid reflux in which she is on medication for  Patient has history of laparoscopic cholecystectomy  Patient also with history of a laparoscopic ventral incisional hernia repair from   Patient concerned about allergic reaction to mesh due to her hypersensitivity to multiple things and due to red areas on abdominal wall   Also history of leukocytosis mild  Patient with past medical history of allergic rhinitis and asthma  Patient has been seen by family physician and Hematology Oncology for this  Currently abdominal pain  ROS:  General ROS: negative  negative for - chills, fatigue, fever or night sweats, weight loss  Respiratory ROS: no cough, shortness of breath, or wheezing  Cardiovascular ROS: no chest pain or dyspnea on exertion  Genito-Urinary ROS: no dysuria, trouble voiding, or hematuria  Musculoskeletal ROS: negative for - gait disturbance, joint pain or muscle pain  Neurological ROS: no TIA or stroke symptoms  reflux symptoms and abdominal pain and see HPI  Skin ROS: no new rashes or lesions   Lymphatic ROS: no new adenopathy noted by pt  GYN ROS: see HPI, no new GYN history or bleeding noted  Psy ROS: no new mental or behavioral disturbances       Patient Active Problem List   Diagnosis    Incisional hernia of anterior abdominal wall without obstruction or gangrene    Obesity (BMI 35 0-39 9 without comorbidity)         Allergies:  Advil sinus congestion & pain [phenylephrine-ibuprofen]; Ibuprofen; Benadryl [diphenhydramine]; Nystatin; and Wound dressings      Current Outpatient Prescriptions:     acetaminophen (TYLENOL) 500 mg tablet, Take 500 mg by mouth every 6 (six) hours as needed for mild pain, Disp: , Rfl:     ALAWAY 0 025 % ophthalmic solution, , Disp: , Rfl:     albuterol (2 5 mg/3 mL) 0 083 % nebulizer solution, Inhale 2 5 mg every 6 (six) hours, Disp: , Rfl:     albuterol (PROVENTIL HFA,VENTOLIN HFA) 90 mcg/act inhaler, Inhale 2 puffs every 6 (six) hours as needed for wheezing, Disp: , Rfl:     CARAFATE 1 GM/10ML suspension, TAKE 10 ML 3 TIMES A DAY, Disp: , Rfl: 0    cetirizine (ZyrTEC) 10 mg tablet, Take 10 mg by mouth daily  , Disp: , Rfl:     clindamycin (CLEOCIN T) 1 % lotion, Apply topically 2 (two) times a day , Disp: , Rfl:     cyanocobalamin (VITAMIN B-12) 1,000 mcg tablet, Take 1,000 mcg by mouth daily , Disp: , Rfl:     famotidine (PEPCID) 20 mg tablet, , Disp: , Rfl:     ferrous sulfate 325 (65 Fe) mg tablet, TAKE 1 TABLET TWICE A DAY, Disp: , Rfl:     fluticasone (FLONASE) 50 mcg/act nasal spray, 1 spray into each nostril daily  , Disp: , Rfl:     hydrOXYzine HCL (ATARAX) 10 mg tablet, Take 10 mg by mouth Three times a day, Disp: , Rfl:     levonorgestrel-ethinyl estradiol (ORSYTHIA) 0 1-20 MG-MCG per tablet, , Disp: , Rfl:     montelukast (SINGULAIR) 10 mg tablet, TAKE 1 TABLET BY MOUTH EVERY DAY, Disp: , Rfl:     Triamcinolone Acetonide (KENALOG EX), Apply topically 2 (two) times a day For neck - dermatitis, Disp: , Rfl:     HYDROcodone-acetaminophen (NORCO) 5-325 mg per tablet, Take 1 tablet by mouth every 4 (four) hours as needed for pain for up to 30 doses Max Daily Amount: 6 tablets, Disp: 30 tablet, Rfl: 0    hydrOXYzine HCL (ATARAX) 10 mg tablet, Take 25 mg by mouth every 6 (six) hours as needed for itching, Disp: , Rfl:     hydrOXYzine HCL (ATARAX) 25 mg tablet, Take 1 tablet by mouth every 6 (six) hours as needed for itching, Disp: 30 tablet, Rfl: 0    ibuprofen (MOTRIN) 400 mg tablet, Take by mouth every 6 (six) hours as needed for mild pain, Disp: , Rfl:     Levonorgestrel-Ethinyl Estrad (ORSYTHIA PO), Take 1 tablet by mouth daily at bedtime  , Disp: , Rfl:     pantoprazole (PROTONIX) 40 mg tablet, Take 40 mg by mouth every morning  , Disp: , Rfl:     Past Medical History:   Diagnosis Date    Abdominal hernia     Anemia     Anxiety     Asthma     Dermatitis     On neck    GERD (gastroesophageal reflux disease)     Hypertension     History of- no longer on medication    Obesity     Reflux esophagitis     Seasonal allergies     Wears partial dentures     Top       Past Surgical History:   Procedure Laterality Date     SECTION      x3    CHOLECYSTECTOMY      Laparoscopic    COLONOSCOPY N/A 3/11/2016    Procedure: COLONOSCOPY;  Surgeon: Nathen Sacks, MD; Location: [facility]; Service:     [place] LAP,DIAGNOSTIC ABDOMEN N/A 10/25/2017    Procedure: LAPAROSCOPY DIAGNOSTIC, HERNIA REPAIR WITH MESH  WITH LYSIS ADHESIONS;  Surgeon: Nan Rivera MD;  Location[facility];  Service: General       No family history on file  reports that she has never smoked  She has never used smokeless tobacco  She reports that she does not drink alcohol or use drugs  Labs:   No results found for: WBC, HGB, HCT, MCV, PLT  No results found for: NA, K, CL, CO2, ANIONGAP, BUN, CREATININE, GLUCOSE, GLUF, CALCIUM, CORRECTEDCA, AST, ALT, ALKPHOS, PROT, ALBUMIN, BILITOT, EGFR      Imaging: No new pertinent imaging studies  PHYSICAL EXAM  General Appearance:    Alert, cooperative, no distress   Head:    Normocephalic without obvious abnormality   Eyes:    PERRL, conjunctiva/corneas clear, EOM's intact        Neck:   Supple, no adenopathy, no JVD   Back:     Symmetric, no spinal or CVA tenderness   Lungs:     Clear to auscultation bilaterally, no wheezing or rhonchi   Heart:    Regular rate and rhythm, S1 and S2 normal, no murmur   Abdomen:    soft, nondistended, +Tender RUQ incision  , +BS, Lower csection incision without erythema , edema or fluctuance   Extremities:   Extremities normal  No clubbing, cyanosis or edema   Psych:   Normal Affect, AOx3  Neurologic:  Skin:   CNII-XII intact  Strength symmetric, speech intact    Warm, dry, small open wounds without surrounding erythema or edema                       Some portions of this record may have been generated with voice recognition software  There may be translation, syntax,  or grammatical errors  Occasional wrong word or "sound-a-like" substitutions may have occurred due to the inherent limitations of the voice recognition software  Read the chart carefully and recognize, using context, where substitutions may have occurred   If you have any questions, please contact the dictating provider for clarification or correction, as needed  This encounter has been coded by a non-certified coder         Katja Kwon MD    Date: 4/13/2018 Time: 12:23 PM

## 2018-04-21 ENCOUNTER — HOSPITAL ENCOUNTER (OUTPATIENT)
Dept: CT IMAGING | Facility: HOSPITAL | Age: 38
Discharge: HOME | End: 2018-04-21
Attending: SURGERY
Payer: COMMERCIAL

## 2018-04-21 DIAGNOSIS — R10.9 ABDOMINAL PAIN, UNSPECIFIED ABDOMINAL LOCATION: ICD-10-CM

## 2018-04-21 PROCEDURE — 74177 CT ABD & PELVIS W/CONTRAST: CPT

## 2018-04-21 RX ADMIN — IOHEXOL 100 ML: 350 INJECTION, SOLUTION INTRAVENOUS at 13:06

## 2018-05-15 RX ORDER — FERROUS SULFATE 325(65) MG
TABLET ORAL
Qty: 180 TABLET | Refills: 3 | OUTPATIENT
Start: 2018-05-15

## 2018-06-05 ENCOUNTER — DOCTOR'S OFFICE (OUTPATIENT)
Dept: URBAN - METROPOLITAN AREA CLINIC 136 | Facility: CLINIC | Age: 38
Setting detail: OPHTHALMOLOGY
End: 2018-06-05
Payer: COMMERCIAL

## 2018-06-05 DIAGNOSIS — H52.223: ICD-10-CM

## 2018-06-05 DIAGNOSIS — H52.03: ICD-10-CM

## 2018-06-05 PROCEDURE — NCRX N/C GLASSES RX: Performed by: OPTOMETRIST

## 2018-06-05 ASSESSMENT — KERATOMETRY
OD_AXISANGLE_DEGREES: 119
OS_AXISANGLE_DEGREES: 078
OS_K2POWER_DIOPTERS: 46.00
OD_K1POWER_DIOPTERS: 45.50
OS_K1POWER_DIOPTERS: 45.25
OD_K2POWER_DIOPTERS: 45.75

## 2018-06-05 ASSESSMENT — REFRACTION_MANIFEST
OS_VA1: 20/
OS_VA2: 20/
OD_VA3: 20/
OD_VA2: 20/
OS_VA3: 20/
OU_VA: 20/
OU_VA: 20/
OS_VA3: 20/
OD_VA1: 20/
OD_VA2: 20/
OD_VA1: 20/
OS_VA1: 20/
OS_VA2: 20/
OD_VA3: 20/

## 2018-06-05 ASSESSMENT — CONFRONTATIONAL VISUAL FIELD TEST (CVF)
OS_FINDINGS: FULL
OD_FINDINGS: FULL

## 2018-06-05 ASSESSMENT — REFRACTION_CURRENTRX
OD_AXIS: 106
OD_SPHERE: +1.00
OD_OVR_VA: 20/
OS_OVR_VA: 20/
OS_SPHERE: +1.00
OS_OVR_VA: 20/
OD_CYLINDER: -1.25
OD_OVR_VA: 20/
OS_CYLINDER: -0.75
OD_OVR_VA: 20/
OD_VPRISM_DIRECTION: SV
OS_AXIS: 083
OS_OVR_VA: 20/
OS_VPRISM_DIRECTION: SV

## 2018-06-05 ASSESSMENT — REFRACTION_AUTOREFRACTION
OS_AXIS: 091
OD_CYLINDER: -1.75
OD_SPHERE: +1.50
OS_SPHERE: +1.25
OD_AXIS: 1060
OS_CYLINDER: -1.25

## 2018-06-05 ASSESSMENT — REFRACTION_OUTSIDERX
OD_SPHERE: +1.25
OS_AXIS: 085
OS_VPRISM: BU
OD_VA3: 20/
OD_VA2: 20/20
OU_VA: 20/20
OD_VA1: 20/20
OS_VA1: 20/20
OS_VA2: 20/20
OD_CYLINDER: -1.25
OS_CYLINDER: -1.25
OD_AXIS: 105
OS_SPHERE: +1.50
OS_VA3: 20/

## 2018-06-05 ASSESSMENT — AXIALLENGTH_DERIVED
OD_AL: 22.6105
OS_AL: 22.6105

## 2018-06-05 ASSESSMENT — SPHEQUIV_DERIVED
OS_SPHEQUIV: 0.625
OD_SPHEQUIV: 0.625

## 2018-06-05 ASSESSMENT — VISUAL ACUITY
OD_BCVA: 20/20
OS_BCVA: 20/20

## 2018-07-03 ENCOUNTER — ANNUAL EXAM (OUTPATIENT)
Dept: OBGYN CLINIC | Facility: MEDICAL CENTER | Age: 38
End: 2018-07-03
Payer: COMMERCIAL

## 2018-07-03 VITALS — WEIGHT: 227 LBS | BODY MASS INDEX: 45.85 KG/M2 | DIASTOLIC BLOOD PRESSURE: 76 MMHG | SYSTOLIC BLOOD PRESSURE: 154 MMHG

## 2018-07-03 DIAGNOSIS — Z01.419 ENCOUNTER FOR WELL WOMAN EXAM WITH ROUTINE GYNECOLOGICAL EXAM: Primary | ICD-10-CM

## 2018-07-03 DIAGNOSIS — Z30.41 ENCOUNTER FOR SURVEILLANCE OF CONTRACEPTIVE PILLS: ICD-10-CM

## 2018-07-03 PROCEDURE — 99395 PREV VISIT EST AGE 18-39: CPT | Performed by: OBSTETRICS & GYNECOLOGY

## 2018-07-03 RX ORDER — LEVONORGESTREL AND ETHINYL ESTRADIOL 0.1-0.02MG
1 KIT ORAL DAILY
Qty: 28 TABLET | Refills: 11 | Status: SHIPPED | OUTPATIENT
Start: 2018-07-03 | End: 2018-07-06 | Stop reason: SDUPTHER

## 2018-07-03 NOTE — PROGRESS NOTES
ASSESSMENT & PLAN: Anastasia Hamilton is a 40 y o  [id number] with normal gynecologic exam     1   Routine well woman exam done today  2  Pap and HPV:  The patient's last pap and hpv was , pap done 2017  It was normal     Pap and cotesting was not done today  Current ASCCP Guidelines reviewed  3   The following were reviewed in today's visit: breast self exam   4  Continue OCPs    CC:  Annual Gynecologic Examination    HPI: Anastasia Hamilton is a 40 y o  [id number] who presents for annual gynecologic examination  She has the following concerns:  No GYN complaints; recently diagnosed with post concussion syndrome, working with PT  Health Maintenance:    She wears her seatbelt routinely  She does perform regular monthly self breast exams  She feels safe at home  Past Medical History:   Diagnosis Date    Abdominal hernia     Anemia     Anxiety     Asthma     Dermatitis     On neck    GERD (gastroesophageal reflux disease)     Hypertension     History of- no longer on medication    Obesity     Reflux esophagitis     Seasonal allergies     Wears partial dentures     Top       Past Surgical History:   Procedure Laterality Date     SECTION      x3    CHOLECYSTECTOMY      Laparoscopic    COLONOSCOPY N/A 3/11/2016    Procedure: COLONOSCOPY;  Surgeon: Jacinto Deng MD;  Location: [facility]; Service:     [place] LAP,DIAGNOSTIC ABDOMEN N/A 10/25/2017    Procedure: LAPAROSCOPY DIAGNOSTIC, HERNIA REPAIR WITH MESH  WITH LYSIS ADHESIONS;  Surgeon: Jacinto Deng MD;  [facility];  Service: General       Past OB/Gyn History:  OB History      Para Term  AB Living    5 4 4   1 4    SAB TAB Ectopic Multiple Live Births    1       4        Pt does not have menstrual issues  History of sexually transmitted infection: No   History of abnormal pap smears: No      Patient is currently sexually active    heterosexual   The current method of family planning is OCP (estrogen/progesterone)  History reviewed  No pertinent family history  Social History:  Social History     Social History    Marital status: /Civil Union     Spouse name: N/A    Number of children: N/A    Years of education: N/A     Occupational History    Not on file  Social History Main Topics    Smoking status: Never Smoker    Smokeless tobacco: Never Used    Alcohol use No    Drug use: No    Sexual activity: Yes     Partners: Male     Birth control/ protection: OCP     Other Topics Concern    Not on file     Social History Narrative    No narrative on file         Allergies   Allergen Reactions    Advil Sinus Congestion & Pain [Phenylephrine-Ibuprofen] Hives    Ibuprofen Hives    Benadryl [Diphenhydramine] Other (See Comments)     palpatations    Nystatin Hives    Wound Dressings Rash         Current Outpatient Prescriptions:     ALAWAY 0 025 % ophthalmic solution, , Disp: , Rfl:     albuterol (2 5 mg/3 mL) 0 083 % nebulizer solution, Inhale 2 5 mg every 6 (six) hours, Disp: , Rfl:     albuterol (PROVENTIL HFA,VENTOLIN HFA) 90 mcg/act inhaler, Inhale 2 puffs every 6 (six) hours as needed for wheezing, Disp: , Rfl:     cetirizine (ZyrTEC) 10 mg tablet, Take 10 mg by mouth daily  , Disp: , Rfl:     clindamycin (CLEOCIN T) 1 % lotion, Apply topically 2 (two) times a day , Disp: , Rfl:     cyanocobalamin (VITAMIN B-12) 1,000 mcg tablet, Take 1,000 mcg by mouth daily  , Disp: , Rfl:     famotidine (PEPCID) 20 mg tablet, , Disp: , Rfl:     ferrous sulfate 325 (65 Fe) mg tablet, TAKE 1 TABLET TWICE A DAY, Disp: , Rfl:     fluticasone (FLONASE) 50 mcg/act nasal spray, 1 spray into each nostril daily  , Disp: , Rfl:     hydrOXYzine HCL (ATARAX) 25 mg tablet, Take 1 tablet by mouth every 6 (six) hours as needed for itching, Disp: 30 tablet, Rfl: 0    levonorgestrel-ethinyl estradiol (ORSYTHIA) 0 1-20 MG-MCG per tablet, Take 1 tablet by mouth daily, Disp: 28 tablet, Rfl: 11   pantoprazole (PROTONIX) 40 mg tablet, Take 40 mg by mouth every morning  , Disp: , Rfl:     acetaminophen (TYLENOL) 500 mg tablet, Take 500 mg by mouth every 6 (six) hours as needed for mild pain, Disp: , Rfl:     CARAFATE 1 GM/10ML suspension, TAKE 10 ML 3 TIMES A DAY, Disp: , Rfl: 0    HYDROcodone-acetaminophen (NORCO) 5-325 mg per tablet, Take 1 tablet by mouth every 4 (four) hours as needed for pain for up to 30 doses Max Daily Amount: 6 tablets, Disp: 30 tablet, Rfl: 0    hydrOXYzine HCL (ATARAX) 10 mg tablet, Take 25 mg by mouth every 6 (six) hours as needed for itching, Disp: , Rfl:     hydrOXYzine HCL (ATARAX) 10 mg tablet, Take 10 mg by mouth Three times a day, Disp: , Rfl:     ibuprofen (MOTRIN) 400 mg tablet, Take by mouth every 6 (six) hours as needed for mild pain, Disp: , Rfl:     Levonorgestrel-Ethinyl Estrad (ORSYTHIA PO), Take 1 tablet by mouth daily at bedtime  , Disp: , Rfl:     montelukast (SINGULAIR) 10 mg tablet, TAKE 1 TABLET BY MOUTH EVERY DAY, Disp: , Rfl:     Triamcinolone Acetonide (KENALOG EX), Apply topically 2 (two) times a day For neck - dermatitis, Disp: , Rfl:       Review of Systems  Constitutional :no fever, feels well, no tiredness, no recent weight gain or loss  ENT: no ear ache, no loss of hearing, no nosebleeds or nasal discharge, no sore throat or hoarseness  Cardiovascular: no complaints of slow or fast heart beat, no chest pain, no palpitations, no leg claudication or lower extremity edema  Respiratory: no complaints of shortness of shortness of breath, no AVENDANO  Breasts:no complaints of breast pain, breast lump, or nipple discharge  Gastrointestinal: no complaints of abdominal pain, constipation, nausea, vomiting, or diarrhea or bloody stools  Genitourinary : no complaints of dysuria, incontinence, pelvic pain, no dysmenorrhea, vaginal discharge or abnormal vaginal bleeding and as noted in HPI    Musculoskeletal: no complaints of arthralgia, no myalgia, no joint swelling or stiffness, no limb pain or swelling  Integumentary: no complaints of skin rash or lesion, itching or dry skin  Neurological: no complaints of headache, no confusion, no numbness or tingling, no dizziness or fainting    Objective      /76   Wt 103 kg (227 lb)   LMP 06/01/2018   BMI 45 85 kg/m²   General:   appears stated age, cooperative, alert normal mood and affect   Neck: normal, supple,trachea midline, no masses   Heart: regular rate and rhythm, S1, S2 normal, no murmur, click, rub or gallop   Lungs: clear to auscultation bilaterally   Breasts: normal appearance, no masses or tenderness, Inspection negative, No nipple retraction or dimpling, No nipple discharge or bleeding, No axillary or supraclavicular adenopathy, Normal to palpation without dominant masses   Abdomen: soft, non-tender, without masses or organomegaly   Vulva: Bartholin's, Urethra, [place] normal, no lesions, normal female hair distribution   Vagina: normal vagina, no discharge, exudate, lesion, or erythema   Urethra: normal   Cervix: Normal, no discharge  Uterus: normal size, contour, position, consistency, mobility, non-tender   Adnexa: normal adnexa   Lymphatic palpation of lymph nodes in neck, axilla, groin and/or other locations: no lymphadenopathy or masses noted   Skin normal skin turgor and no rashes     Psychiatric orientation to person, place, and time: normal  mood and affect: normal

## 2018-07-06 DIAGNOSIS — Z30.41 ENCOUNTER FOR SURVEILLANCE OF CONTRACEPTIVE PILLS: ICD-10-CM

## 2018-07-06 RX ORDER — LEVONORGESTREL AND ETHINYL ESTRADIOL 0.1-0.02MG
KIT ORAL
Qty: 28 TABLET | Refills: 3 | Status: SHIPPED | OUTPATIENT
Start: 2018-07-06 | End: 2019-07-09 | Stop reason: SDUPTHER

## 2018-08-08 ENCOUNTER — DOCTOR'S OFFICE (OUTPATIENT)
Dept: URBAN - METROPOLITAN AREA CLINIC 136 | Facility: CLINIC | Age: 38
Setting detail: OPHTHALMOLOGY
End: 2018-08-08

## 2018-08-08 DIAGNOSIS — H25.011: ICD-10-CM

## 2018-08-08 PROCEDURE — MEDICAL RE MEDICAL RECORDS: Performed by: OPTOMETRIST

## 2018-10-24 ENCOUNTER — OFFICE VISIT (OUTPATIENT)
Dept: URGENT CARE | Facility: MEDICAL CENTER | Age: 38
End: 2018-10-24
Payer: COMMERCIAL

## 2018-10-24 VITALS
BODY MASS INDEX: 41.93 KG/M2 | RESPIRATION RATE: 18 BRPM | DIASTOLIC BLOOD PRESSURE: 63 MMHG | HEIGHT: 59 IN | SYSTOLIC BLOOD PRESSURE: 144 MMHG | TEMPERATURE: 99.3 F | WEIGHT: 208 LBS | OXYGEN SATURATION: 98 % | HEART RATE: 106 BPM

## 2018-10-24 DIAGNOSIS — J06.9 ACUTE URI: Primary | ICD-10-CM

## 2018-10-24 PROCEDURE — 99283 EMERGENCY DEPT VISIT LOW MDM: CPT | Performed by: PHYSICIAN ASSISTANT

## 2018-10-24 PROCEDURE — G0382 LEV 3 HOSP TYPE B ED VISIT: HCPCS | Performed by: PHYSICIAN ASSISTANT

## 2018-10-24 RX ORDER — DEXTROMETHORPHAN POLISTIREX 30 MG/5ML
10 SUSPENSION ORAL 2 TIMES DAILY
Qty: 148 ML | Refills: 0 | Status: SHIPPED | OUTPATIENT
Start: 2018-10-24 | End: 2018-11-24 | Stop reason: ALTCHOICE

## 2018-10-24 NOTE — PATIENT INSTRUCTIONS
Take Delsym as prescribed  Continue Flonase and try saline nasal spray  Tylenol for pain  Warm compresses over sinuses  Steam treatment (practice proper safety precautions when handing hot liquids/steam)  Consider Neti Pot  Follow up with PCP in 3-5 days  Proceed to  ER if symptoms worsen  Rhinosinusitis   WHAT YOU NEED TO KNOW:   Rhinosinusitis (RS) is inflammation of your nose and sinuses  It commonly begins as a virus, often as a common cold  Viruses usually last 7 to 10 days and do not need treatment  When the virus does not get better on its own, you may have bacterial RS  This means that bacteria have begun to grow inside your sinuses  Acute RS lasts less than 4 weeks  Chronic RS lasts 12 weeks or more  Recurrent RS is when you have 4 or more episodes of RS in one year  DISCHARGE INSTRUCTIONS:   Return to the emergency department if:   · Your eye and eyelid are red, swollen, and painful  · You cannot open your eye  · You have double vision or you cannot see  · Your eyeball bulges out or you cannot move your eye  · You are more sleepy than normal or you notice changes in your ability to think, move, or talk  · You have a stiff neck, a fever, or a bad headache  · You have swelling of your forehead or scalp  Contact your healthcare provider if:   · Your symptoms are worse or do not improve after 3 to 5 days of treatment  · You have questions or concerns about your condition or care  Medicines: You may need any of the following:  · Acetaminophen  decreases pain and fever  It is available without a doctor's order  Ask how much to take and how often to take it  Follow directions  Acetaminophen can cause liver damage if not taken correctly  · NSAIDs , such as ibuprofen, help decrease swelling, pain, and fever  This medicine is available with or without a doctor's order  NSAIDs can cause stomach bleeding or kidney problems in certain people   If you take blood thinner medicine, always ask your healthcare provider if NSAIDs are safe for you  Always read the medicine label and follow directions  · Nasal steroid sprays  decrease inflammation in your nose and sinuses  · Decongestants  reduce swelling and drain mucus in the nose and sinuses  They may help you breathe easier  · Antihistamines  dry mucus in the nose and relieve sneezing  · Antibiotics  treat a bacterial infection and may be needed if your symptoms do not improve or they get worse  · Take your medicine as directed  Contact your healthcare provider if you think your medicine is not helping or if you have side effects  Tell him or her if you are allergic to any medicine  Keep a list of the medicines, vitamins, and herbs you take  Include the amounts, and when and why you take them  Bring the list or the pill bottles to follow-up visits  Carry your medicine list with you in case of an emergency  Self-care:   · Rinse your sinuses  Use a sinus rinse device to rinse your nasal passages with a saline (salt water) solution  This will help thin the mucus in your nose and rinse away pollen and dirt  It will also help reduce swelling so you can breathe normally  Ask your healthcare provider how often to do this  · Breathe in steam   Heat a bowl of water until you see steam  Lean over the bowl and make a tent over your head with a large towel  Breathe deeply for about 20 minutes  Be careful not to get too close to the steam or burn yourself  Do this 3 times a day  You can also breathe deeply when you take a hot shower  · Sleep with your head elevated  Place an extra pillow under your head before you go to sleep to help your sinuses drain  · Drink liquids as directed  Ask your healthcare provider how much liquid to drink each day and which liquids are best for you  Liquids will thin the mucus in your nose and help it drain  Avoid drinks that contain alcohol or caffeine       · Do not smoke, and avoid secondhand smoke  Nicotine and other chemicals in cigarettes and cigars can make your symptoms worse  Ask your healthcare provider for information if you currently smoke and need help to quit  E-cigarettes or smokeless tobacco still contain nicotine  Talk to your healthcare provider before you use these products  Follow up with your healthcare provider as directed: Follow up if your symptoms are worse or not better after 3 to 5 days of treatment  Write down your questions so you remember to ask them during your visits  © 2017 [facility] Information is for End User's use only and may not be sold, redistributed or otherwise used for commercial purposes  All illustrations and images included in CareNotes® are the copyrighted property of A D [employer] M [employer]  or Vinicius Sandoval  The above information is an  only  It is not intended as medical advice for individual conditions or treatments  Talk to your doctor, nurse or pharmacist before following any medical regimen to see if it is safe and effective for you

## 2018-10-24 NOTE — PROGRESS NOTES
[facility]        NAME: Milli Barrientos is a 45 y o  female  : 1980    MRN: [id number]  DATE: 2018  TIME: 12:12 PM    Assessment and Plan   Acute URI [J06 9]  1  Acute URI  Dextromethorphan Polistirex ER (DELSYM) 30 MG/5ML SUER         Patient Instructions     Take Delsym as prescribed  Continue Flonase and try saline nasal spray  Tylenol for pain  Warm compresses over sinuses  Steam treatment (practice proper safety precautions when handing hot liquids/steam)  Consider Neti Pot  Follow up with PCP in 3-5 days  Proceed to  ER if symptoms worsen  Chief Complaint     Chief Complaint   Patient presents with    Facial Pain     sinus pain above eyes and back of head with cough since last nighe         History of Present Illness       PMH seasonal allergies  Sinusitis   This is a new problem  The current episode started yesterday  The problem is unchanged  There has been no fever  Associated symptoms include coughing, headaches and sinus pressure  Pertinent negatives include no chills, congestion, diaphoresis, ear pain, hoarse voice, neck pain, shortness of breath, sneezing, sore throat or swollen glands  Treatments tried: Zyrtec; albuterol  The treatment provided no relief  Review of Systems   Review of Systems   Constitutional: Negative for activity change, appetite change, chills, diaphoresis and fever  HENT: Positive for sinus pressure  Negative for congestion, dental problem, ear discharge, ear pain, facial swelling, hoarse voice, postnasal drip, rhinorrhea, sinus pain, sneezing, sore throat and trouble swallowing  Eyes: Negative for itching  Respiratory: Positive for cough  Negative for chest tightness, shortness of breath and wheezing  Cardiovascular: Negative for chest pain and palpitations  Gastrointestinal: Negative for abdominal pain, constipation, diarrhea, nausea and vomiting  Musculoskeletal: Positive for myalgias  Negative for neck pain  Skin: Negative for rash  Neurological: Positive for headaches  Negative for dizziness, weakness and light-headedness  Current Medications       Current Outpatient Prescriptions:     acetaminophen (TYLENOL) 500 mg tablet, Take 500 mg by mouth every 6 (six) hours as needed for mild pain, Disp: , Rfl:     ALAWAY 0 025 % ophthalmic solution, , Disp: , Rfl:     albuterol (2 5 mg/3 mL) 0 083 % nebulizer solution, Inhale 2 5 mg every 6 (six) hours, Disp: , Rfl:     albuterol (PROVENTIL HFA,VENTOLIN HFA) 90 mcg/act inhaler, Inhale 2 puffs every 6 (six) hours as needed for wheezing, Disp: , Rfl:     cetirizine (ZyrTEC) 10 mg tablet, Take 10 mg by mouth daily  , Disp: , Rfl:     cyanocobalamin (VITAMIN B-12) 1,000 mcg tablet, Take 1,000 mcg by mouth daily  , Disp: , Rfl:     ferrous sulfate 325 (65 Fe) mg tablet, TAKE 1 TABLET TWICE A DAY, Disp: , Rfl:     fluticasone (FLONASE) 50 mcg/act nasal spray, 1 spray into each nostril daily  , Disp: , Rfl:     hydrOXYzine HCL (ATARAX) 10 mg tablet, Take 25 mg by mouth every 6 (six) hours as needed for itching, Disp: , Rfl:     pantoprazole (PROTONIX) 40 mg tablet, Take 40 mg by mouth every morning  , Disp: , Rfl:     SRONYX 0 1-20 MG-MCG per tablet, TAKE 1 TABLET DAILY AS DIRECTED, Disp: 28 tablet, Rfl: 3    CARAFATE 1 GM/10ML suspension, TAKE 10 ML 3 TIMES A DAY, Disp: , Rfl: 0    clindamycin (CLEOCIN T) 1 % lotion, Apply topically 2 (two) times a day , Disp: , Rfl:     Dextromethorphan Polistirex ER (DELSYM) 30 MG/5ML SUER, Take 10 mL (60 mg total) by mouth 2 (two) times a day, Disp: 148 mL, Rfl: 0    famotidine (PEPCID) 20 mg tablet, , Disp: , Rfl:     HYDROcodone-acetaminophen (NORCO) 5-325 mg per tablet, Take 1 tablet by mouth every 4 (four) hours as needed for pain for up to 30 doses Max Daily Amount: 6 tablets (Patient not taking: Reported on 10/24/2018 ), Disp: 30 tablet, Rfl: 0    hydrOXYzine HCL (ATARAX) 10 mg tablet, Take 10 mg by mouth Three times a day, Disp: , Rfl:     hydrOXYzine HCL (ATARAX) 25 mg tablet, Take 1 tablet by mouth every 6 (six) hours as needed for itching (Patient not taking: Reported on 10/24/2018 ), Disp: 30 tablet, Rfl: 0    ibuprofen (MOTRIN) 400 mg tablet, Take by mouth every 6 (six) hours as needed for mild pain, Disp: , Rfl:     Levonorgestrel-Ethinyl Estrad (ORSYTHIA PO), Take 1 tablet by mouth daily at bedtime  , Disp: , Rfl:     montelukast (SINGULAIR) 10 mg tablet, TAKE 1 TABLET BY MOUTH EVERY DAY, Disp: , Rfl:     Triamcinolone Acetonide (KENALOG EX), Apply topically 2 (two) times a day For neck - dermatitis, Disp: , Rfl:     Current Allergies     Allergies as of 10/24/2018 - Reviewed 10/24/2018   Allergen Reaction Noted    Advil sinus congestion & pain [phenylephrine-ibuprofen] Hives 10/18/2017    Ibuprofen Hives 2018    Benadryl [diphenhydramine] Other (See Comments) 2016    Nystatin Hives 2016    Wound dressings Rash 2017            The following portions of the patient's history were reviewed and updated as appropriate: allergies, current medications, past family history, past medical history, past social history, past surgical history and problem list      Past Medical History:   Diagnosis Date    Abdominal hernia     Anemia     Anxiety     Asthma     Dermatitis     On neck    GERD (gastroesophageal reflux disease)     Hypertension     History of- no longer on medication    Obesity     Reflux esophagitis     Seasonal allergies     Wears partial dentures     Top       Past Surgical History:   Procedure Laterality Date     SECTION      x3    CHOLECYSTECTOMY      Laparoscopic    COLONOSCOPY N/A 3/11/2016    Procedure: COLONOSCOPY;  Surgeon: Najma Marcus MD;  Location: [facility];   Service:     [place] LAP,DIAGNOSTIC ABDOMEN N/A 10/25/2017    Procedure: LAPAROSCOPY DIAGNOSTIC, HERNIA REPAIR WITH MESH  WITH LYSIS ADHESIONS;  Surgeon: Najma Marcus MD;  Location: [facility];  Service: General       No family history on file  Medications have been verified  Objective   /63   Pulse (!) 106   Temp 99 3 °F (37 4 °C) (Tympanic)   Resp 18   Ht 4' 11" (1 499 m)   Wt 94 3 kg (208 lb)   LMP 09/24/2018   SpO2 98%   BMI 42 01 kg/m²        Physical Exam     Physical Exam   Constitutional: She is oriented to person, place, and time  She appears well-developed and well-nourished  No distress  HENT:   Head: Normocephalic and atraumatic  Right Ear: External ear normal    Left Ear: External ear normal    Mouth/Throat: Oropharynx is clear and moist  No oropharyngeal exudate  Sinuses non-tenderness to palpation  Eyes: Conjunctivae are normal  Right eye exhibits no discharge  Left eye exhibits no discharge  Cardiovascular: Normal rate, regular rhythm and normal heart sounds  Exam reveals no gallop and no friction rub  No murmur heard  Pulmonary/Chest: Effort normal and breath sounds normal  No respiratory distress  She has no wheezes  She has no rales  She exhibits no tenderness  Lymphadenopathy:     She has no cervical adenopathy  Neurological: She is alert and oriented to person, place, and time  Skin: Skin is warm  No rash noted  Psychiatric: She has a normal mood and affect  Her behavior is normal  Judgment and thought content normal    Vitals reviewed

## 2018-10-25 ENCOUNTER — APPOINTMENT (OUTPATIENT)
Dept: RADIOLOGY | Facility: MEDICAL CENTER | Age: 38
End: 2018-10-25
Payer: COMMERCIAL

## 2018-10-25 ENCOUNTER — OFFICE VISIT (OUTPATIENT)
Dept: URGENT CARE | Facility: MEDICAL CENTER | Age: 38
End: 2018-10-25
Payer: COMMERCIAL

## 2018-10-25 VITALS
BODY MASS INDEX: 41.93 KG/M2 | WEIGHT: 208 LBS | TEMPERATURE: 101.3 F | HEART RATE: 99 BPM | SYSTOLIC BLOOD PRESSURE: 173 MMHG | DIASTOLIC BLOOD PRESSURE: 83 MMHG | OXYGEN SATURATION: 99 % | HEIGHT: 59 IN | RESPIRATION RATE: 18 BRPM

## 2018-10-25 DIAGNOSIS — J20.9 ACUTE BRONCHITIS, UNSPECIFIED ORGANISM: Primary | ICD-10-CM

## 2018-10-25 DIAGNOSIS — R05.9 COUGH: ICD-10-CM

## 2018-10-25 PROCEDURE — 71046 X-RAY EXAM CHEST 2 VIEWS: CPT

## 2018-10-25 PROCEDURE — G0383 LEV 4 HOSP TYPE B ED VISIT: HCPCS | Performed by: FAMILY MEDICINE

## 2018-10-25 PROCEDURE — 99284 EMERGENCY DEPT VISIT MOD MDM: CPT | Performed by: FAMILY MEDICINE

## 2018-10-25 RX ORDER — BENZONATATE 100 MG/1
100 CAPSULE ORAL 3 TIMES DAILY PRN
Qty: 20 CAPSULE | Refills: 0 | Status: SHIPPED | OUTPATIENT
Start: 2018-10-25 | End: 2018-11-24 | Stop reason: ALTCHOICE

## 2018-10-25 RX ORDER — AZITHROMYCIN 250 MG/1
TABLET, FILM COATED ORAL
Qty: 6 TABLET | Refills: 0 | Status: SHIPPED | OUTPATIENT
Start: 2018-10-25 | End: 2018-10-29

## 2018-10-25 RX ORDER — PREDNISONE 20 MG/1
TABLET ORAL
Qty: 18 TABLET | Refills: 0 | Status: SHIPPED | OUTPATIENT
Start: 2018-10-25 | End: 2018-11-24 | Stop reason: ALTCHOICE

## 2018-10-25 NOTE — PATIENT INSTRUCTIONS
Chest x-ray reveals no infiltrates or effusion  However patient has fever  She was started empirically on Zithromax Z-Flako, prednisone tapering from 60 down to 20 mg over 9 days, Andrew Dela Cruz for cough  Follow up with primary care provider symptoms persist or worsens  Acute Bronchitis   WHAT YOU NEED TO KNOW:   Acute bronchitis is swelling and irritation in the air passages of your lungs  This irritation may cause you to cough or have other breathing problems  Acute bronchitis often starts because of another illness, such as a cold or the flu  The illness spreads from your nose and throat to your windpipe and airways  Bronchitis is often called a chest cold  Acute bronchitis lasts about 3 to 6 weeks and is usually not a serious illness  Your cough can last for several weeks  DISCHARGE INSTRUCTIONS:   Return to the emergency department if:   · You cough up blood  · Your lips or fingernails turn blue  · You feel like you are not getting enough air when you breathe  Contact your healthcare provider if:   · You have a fever  · Your breathing problems do not go away or get worse  · Your cough does not get better within 4 weeks  · You have questions or concerns about your condition or care  Self-care:   · Get more rest   Rest helps your body to heal  Slowly start to do more each day  Rest when you feel it is needed  · Avoid irritants in the air  Avoid chemicals, fumes, and dust  Wear a face mask if you must work around dust or fumes  Stay inside on days when air pollution levels are high  If you have allergies, stay inside when pollen counts are high  Do not use aerosol products, such as spray-on deodorant, bug spray, and hair spray  · Do not smoke or be around others who smoke  Nicotine and other chemicals in cigarettes and cigars damages the cilia that move mucus out of your lungs  Ask your healthcare provider for information if you currently smoke and need help to quit   E-cigarettes or smokeless tobacco still contain nicotine  Talk to your healthcare provider before you use these products  · Drink liquids as directed  Liquids help keep your air passages moist and help you cough up mucus  You may need to drink more liquids when you have acute bronchitis  Ask how much liquid to drink each day and which liquids are best for you  · Use a humidifier or vaporizer  Use a cool mist humidifier or a vaporizer to increase air moisture in your home  This may make it easier for you to breathe and help decrease your cough  Decrease risk for acute bronchitis:   · Get the vaccinations you need  Ask your healthcare provider if you should get vaccinated against the flu or pneumonia  · Prevent the spread of germs  You can decrease your risk of acute bronchitis and other illnesses by doing the following:     [facility] [place] often with soap and water  Carry germ-killing hand lotion or gel with you  You can use the lotion or gel to clean your hands when soap and water are not available  ¨ Do not touch your eyes, nose, or mouth unless you have washed your hands first     ¨ Always cover your mouth when you cough to prevent the spread of germs  It is best to cough into a tissue or your shirt sleeve instead of into your hand  Ask those around you cover their mouths when they cough  ¨ Try to avoid people who have a cold or the flu  If you are sick, stay away from others as much as possible  Medicines: Your healthcare provider may  give you any of the following:  · Ibuprofen or acetaminophen  are medicines that help lower your fever  They are available without a doctor's order  Ask your healthcare provider which medicine is right for you  Ask how much to take and how often to take it  Follow directions  These medicines can cause stomach bleeding if not taken correctly  Ibuprofen can cause kidney damage  Do not take ibuprofen if you have kidney disease, an ulcer, or allergies to aspirin  Acetaminophen can cause liver damage  Do not take more than 4,000 milligrams in 24 hours  · Decongestants  help loosen mucus in your lungs and make it easier to cough up  This can help you breathe easier  · Cough suppressants  decrease your urge to cough  If your cough produces mucus, do not take a cough suppressant unless your healthcare provider tells you to  Your healthcare provider may suggest that you take a cough suppressant at night so you can rest     · Inhalers  may be given  Your healthcare provider may give you one or more inhalers to help you breathe easier and cough less  An inhaler gives your medicine to open your airways  Ask your healthcare provider to show you how to use your inhaler correctly  · Take your medicine as directed  Contact your healthcare provider if you think your medicine is not helping or if you have side effects  Tell him of her if you are allergic to any medicine  Keep a list of the medicines, vitamins, and herbs you take  Include the amounts, and when and why you take them  Bring the list or the pill bottles to follow-up visits  Carry your medicine list with you in case of an emergency  Follow up with your healthcare provider as directed:  Write down questions you have so you will remember to ask them during your follow-up visits  © 2017 [address] [employer] Information is for End User's use only and may not be sold, redistributed or otherwise used for commercial purposes  All illustrations and images included in CareNotes® are the copyrighted property of [employer]  or Vinicius Sandoval  The above information is an  only  It is not intended as medical advice for individual conditions or treatments  Talk to your doctor, nurse or pharmacist before following any medical regimen to see if it is safe and effective for you

## 2018-10-26 NOTE — PROGRESS NOTES
[facility]        NAME: Dain Daily is a 45 y o  female  : 1980    MRN: [id number]  DATE: 2018  TIME: 9:11 PM    Assessment and Plan   Acute bronchitis, unspecified organism [J20 9]  1  Acute bronchitis, unspecified organism  azithromycin (ZITHROMAX) 250 mg tablet    predniSONE (DELTASONE) 20 mg tablet    benzonatate (TESSALON PERLES) 100 mg capsule   2  Cough  XR chest pa & lateral         Patient Instructions       Follow up with PCP in 3-5 days  Proceed to  ER if symptoms worsen  Chief Complaint     Chief Complaint   Patient presents with    Cold Like Symptoms     here yesterday still doesnt feel better         History of Present Illness       Patient here with cold symptoms that have been present for the last 2 days  She was seen at urgent care yesterday and was prescribed dextromethorphan which does not seem to help cough  She is not complaining of cough, sinus pressure for the last 2 days  She has a known history of asthma has needed to use her Ventolin inhaler every 4 hours  Has noticed some wheezing today  Denies postnasal drip but complaining of chills but no fever  She has been taking some over-the-counter Tylenol every 4 hours for fever and body aches  Review of Systems   Review of Systems   Constitutional: Positive for chills  Negative for fever  HENT: Positive for congestion  Respiratory: Positive for shortness of breath and wheezing            Current Medications       Current Outpatient Prescriptions:     acetaminophen (TYLENOL) 500 mg tablet, Take 500 mg by mouth every 6 (six) hours as needed for mild pain, Disp: , Rfl:     ALAWAY 0 025 % ophthalmic solution, , Disp: , Rfl:     albuterol (2 5 mg/3 mL) 0 083 % nebulizer solution, Inhale 2 5 mg every 6 (six) hours, Disp: , Rfl:     albuterol (PROVENTIL HFA,VENTOLIN HFA) 90 mcg/act inhaler, Inhale 2 puffs every 6 (six) hours as needed for wheezing, Disp: , Rfl:     azithromycin (ZITHROMAX) 250 mg tablet, Take 2 tablets today then 1 tablet daily x 4 days, Disp: 6 tablet, Rfl: 0    benzonatate (TESSALON PERLES) 100 mg capsule, Take 1 capsule (100 mg total) by mouth 3 (three) times a day as needed for cough, Disp: 20 capsule, Rfl: 0    CARAFATE 1 GM/10ML suspension, TAKE 10 ML 3 TIMES A DAY, Disp: , Rfl: 0    cetirizine (ZyrTEC) 10 mg tablet, Take 10 mg by mouth daily  , Disp: , Rfl:     clindamycin (CLEOCIN T) 1 % lotion, Apply topically 2 (two) times a day , Disp: , Rfl:     cyanocobalamin (VITAMIN B-12) 1,000 mcg tablet, Take 1,000 mcg by mouth daily  , Disp: , Rfl:     Dextromethorphan Polistirex ER (DELSYM) 30 MG/5ML SUER, Take 10 mL (60 mg total) by mouth 2 (two) times a day, Disp: 148 mL, Rfl: 0    famotidine (PEPCID) 20 mg tablet, , Disp: , Rfl:     ferrous sulfate 325 (65 Fe) mg tablet, TAKE 1 TABLET TWICE A DAY, Disp: , Rfl:     fluticasone (FLONASE) 50 mcg/act nasal spray, 1 spray into each nostril daily  , Disp: , Rfl:     HYDROcodone-acetaminophen (NORCO) 5-325 mg per tablet, Take 1 tablet by mouth every 4 (four) hours as needed for pain for up to 30 doses Max Daily Amount: 6 tablets (Patient not taking: Reported on 10/24/2018 ), Disp: 30 tablet, Rfl: 0    hydrOXYzine HCL (ATARAX) 10 mg tablet, Take 25 mg by mouth every 6 (six) hours as needed for itching, Disp: , Rfl:     hydrOXYzine HCL (ATARAX) 10 mg tablet, Take 10 mg by mouth Three times a day, Disp: , Rfl:     hydrOXYzine HCL (ATARAX) 25 mg tablet, Take 1 tablet by mouth every 6 (six) hours as needed for itching (Patient not taking: Reported on 10/24/2018 ), Disp: 30 tablet, Rfl: 0    ibuprofen (MOTRIN) 400 mg tablet, Take by mouth every 6 (six) hours as needed for mild pain, Disp: , Rfl:     Levonorgestrel-Ethinyl Estrad (ORSYTHIA PO), Take 1 tablet by mouth daily at bedtime  , Disp: , Rfl:     montelukast (SINGULAIR) 10 mg tablet, TAKE 1 TABLET BY MOUTH EVERY DAY, Disp: , Rfl:     pantoprazole (PROTONIX) 40 mg tablet, Take 40 mg by mouth every morning  , Disp: , Rfl:     predniSONE (DELTASONE) 20 mg tablet, Take 3 tablets for 3 days then 2 tablets for 3 days then 1 tablet for 3 days then stop, Disp: 18 tablet, Rfl: 0    SRONYX 0 1-20 MG-MCG per tablet, TAKE 1 TABLET DAILY AS DIRECTED, Disp: 28 tablet, Rfl: 3    Triamcinolone Acetonide (KENALOG EX), Apply topically 2 (two) times a day For neck - dermatitis, Disp: , Rfl:     Current Allergies     Allergies as of 10/25/2018 - Reviewed 10/25/2018   Allergen Reaction Noted    Advil sinus congestion & pain [phenylephrine-ibuprofen] Hives 10/18/2017    Ibuprofen Hives 2018    Benadryl [diphenhydramine] Other (See Comments) 2016    Nystatin Hives 2016    Wound dressings Rash 2017            The following portions of the patient's history were reviewed and updated as appropriate: allergies, current medications, past family history, past medical history, past social history, past surgical history and problem list      Past Medical History:   Diagnosis Date    Abdominal hernia     Anemia     Anxiety     Asthma     Dermatitis     On neck    GERD (gastroesophageal reflux disease)     Hypertension     History of- no longer on medication    Obesity     Reflux esophagitis     Seasonal allergies     Wears partial dentures     Top       Past Surgical History:   Procedure Laterality Date     SECTION      x3    CHOLECYSTECTOMY      Laparoscopic    COLONOSCOPY N/A 3/11/2016    Procedure: COLONOSCOPY;  Surgeon: Betsy Kamara MD;  Location: [facility]; Service:     [place] LAP,DIAGNOSTIC ABDOMEN N/A 10/25/2017    Procedure: LAPAROSCOPY DIAGNOSTIC, HERNIA REPAIR WITH MESH  WITH LYSIS ADHESIONS;  Surgeon: Betsy Kamara MD;  Location: [facility];  Service: General       No family history on file  Medications have been verified          Objective   BP (!) 173/83   Pulse 99   Temp (!) 101 3 °F (38 5 °C) (Tympanic)   Resp 18   Ht 4' 11" (1 499 m)   Wt 94 3 kg (208 lb)   SpO2 99%   BMI 42 01 kg/m²        Physical Exam     Physical Exam   Constitutional: She appears well-developed  HENT:   Right Ear: External ear normal    Left Ear: External ear normal    Mouth/Throat: Oropharynx is clear and moist    Hypertrophic turbinates bilaterally  Neck: Normal range of motion  Neck supple  Cardiovascular: Normal rate and regular rhythm  Pulmonary/Chest: Effort normal  She has rales  Nursing note and vitals reviewed

## 2018-11-24 ENCOUNTER — APPOINTMENT (EMERGENCY)
Dept: NON INVASIVE DIAGNOSTICS | Facility: HOSPITAL | Age: 38
End: 2018-11-24
Payer: COMMERCIAL

## 2018-11-24 ENCOUNTER — OFFICE VISIT (OUTPATIENT)
Dept: URGENT CARE | Facility: MEDICAL CENTER | Age: 38
End: 2018-11-24
Payer: COMMERCIAL

## 2018-11-24 ENCOUNTER — APPOINTMENT (EMERGENCY)
Dept: RADIOLOGY | Facility: HOSPITAL | Age: 38
End: 2018-11-24
Payer: COMMERCIAL

## 2018-11-24 ENCOUNTER — HOSPITAL ENCOUNTER (EMERGENCY)
Facility: HOSPITAL | Age: 38
Discharge: HOME | End: 2018-11-24
Attending: EMERGENCY MEDICINE
Payer: COMMERCIAL

## 2018-11-24 VITALS
TEMPERATURE: 97.4 F | RESPIRATION RATE: 20 BRPM | DIASTOLIC BLOOD PRESSURE: 80 MMHG | HEART RATE: 76 BPM | SYSTOLIC BLOOD PRESSURE: 148 MMHG | OXYGEN SATURATION: 99 %

## 2018-11-24 VITALS
WEIGHT: 241 LBS | OXYGEN SATURATION: 97 % | SYSTOLIC BLOOD PRESSURE: 147 MMHG | RESPIRATION RATE: 18 BRPM | HEART RATE: 77 BPM | TEMPERATURE: 97.6 F | DIASTOLIC BLOOD PRESSURE: 94 MMHG | BODY MASS INDEX: 48.68 KG/M2

## 2018-11-24 DIAGNOSIS — R60.0 LOWER EXTREMITY EDEMA: Primary | ICD-10-CM

## 2018-11-24 DIAGNOSIS — R06.2 WHEEZING: ICD-10-CM

## 2018-11-24 DIAGNOSIS — M79.89 LEG SWELLING: Primary | ICD-10-CM

## 2018-11-24 DIAGNOSIS — R06.02 SOB (SHORTNESS OF BREATH): ICD-10-CM

## 2018-11-24 LAB
ANION GAP SERPL CALCULATED.3IONS-SCNC: 10 MMOL/L (ref 4–13)
ATRIAL RATE: 61 BPM
BACTERIA UR QL AUTO: ABNORMAL /HPF
BASOPHILS # BLD AUTO: 0.03 THOUSANDS/ΜL (ref 0–0.1)
BASOPHILS NFR BLD AUTO: 0 % (ref 0–1)
BILIRUB UR QL STRIP: NEGATIVE
BUN SERPL-MCNC: 17 MG/DL (ref 5–25)
CALCIUM SERPL-MCNC: 8.9 MG/DL (ref 8.3–10.1)
CHLORIDE SERPL-SCNC: 105 MMOL/L (ref 100–108)
CLARITY UR: CLEAR
CO2 SERPL-SCNC: 26 MMOL/L (ref 21–32)
COLOR UR: YELLOW
CREAT SERPL-MCNC: 0.67 MG/DL (ref 0.6–1.3)
DEPRECATED D DIMER PPP: 923 NG/ML (FEU)
EOSINOPHIL # BLD AUTO: 0.2 THOUSAND/ΜL (ref 0–0.61)
EOSINOPHIL NFR BLD AUTO: 2 % (ref 0–6)
ERYTHROCYTE [DISTWIDTH] IN BLOOD BY AUTOMATED COUNT: 16.1 % (ref 11.6–15.1)
EXT PREG TEST URINE: NORMAL
GFR SERPL CREATININE-BSD FRML MDRD: 112 ML/MIN/1.73SQ M
GLUCOSE SERPL-MCNC: 93 MG/DL (ref 65–140)
GLUCOSE UR STRIP-MCNC: NEGATIVE MG/DL
HCT VFR BLD AUTO: 37 % (ref 34.8–46.1)
HGB BLD-MCNC: 11 G/DL (ref 11.5–15.4)
HGB UR QL STRIP.AUTO: ABNORMAL
IMM GRANULOCYTES # BLD AUTO: 0.06 THOUSAND/UL (ref 0–0.2)
IMM GRANULOCYTES NFR BLD AUTO: 1 % (ref 0–2)
KETONES UR STRIP-MCNC: NEGATIVE MG/DL
LEUKOCYTE ESTERASE UR QL STRIP: NEGATIVE
LYMPHOCYTES # BLD AUTO: 2.98 THOUSANDS/ΜL (ref 0.6–4.47)
LYMPHOCYTES NFR BLD AUTO: 27 % (ref 14–44)
MCH RBC QN AUTO: 25.6 PG (ref 26.8–34.3)
MCHC RBC AUTO-ENTMCNC: 29.7 G/DL (ref 31.4–37.4)
MCV RBC AUTO: 86 FL (ref 82–98)
MONOCYTES # BLD AUTO: 0.57 THOUSAND/ΜL (ref 0.17–1.22)
MONOCYTES NFR BLD AUTO: 5 % (ref 4–12)
NEUTROPHILS # BLD AUTO: 7.25 THOUSANDS/ΜL (ref 1.85–7.62)
NEUTS SEG NFR BLD AUTO: 65 % (ref 43–75)
NITRITE UR QL STRIP: NEGATIVE
NON-SQ EPI CELLS URNS QL MICRO: ABNORMAL /HPF
NRBC BLD AUTO-RTO: 0 /100 WBCS
NT-PROBNP SERPL-MCNC: 147 PG/ML
P AXIS: 60 DEGREES
PH UR STRIP.AUTO: 6 [PH] (ref 4.5–8)
PLATELET # BLD AUTO: 282 THOUSANDS/UL (ref 149–390)
PMV BLD AUTO: 9.5 FL (ref 8.9–12.7)
POTASSIUM SERPL-SCNC: 3.9 MMOL/L (ref 3.5–5.3)
PR INTERVAL: 160 MS
PROT UR STRIP-MCNC: NEGATIVE MG/DL
QRS AXIS: 48 DEGREES
QRSD INTERVAL: 74 MS
QT INTERVAL: 368 MS
QTC INTERVAL: 370 MS
RBC # BLD AUTO: 4.3 MILLION/UL (ref 3.81–5.12)
RBC #/AREA URNS AUTO: ABNORMAL /HPF
SODIUM SERPL-SCNC: 141 MMOL/L (ref 136–145)
SP GR UR STRIP.AUTO: 1.01 (ref 1–1.03)
T WAVE AXIS: 16 DEGREES
TROPONIN I SERPL-MCNC: <0.02 NG/ML
UROBILINOGEN UR QL STRIP.AUTO: 0.2 E.U./DL
VENTRICULAR RATE: 61 BPM
WBC # BLD AUTO: 11.09 THOUSAND/UL (ref 4.31–10.16)
WBC #/AREA URNS AUTO: ABNORMAL /HPF

## 2018-11-24 PROCEDURE — 85379 FIBRIN DEGRADATION QUANT: CPT | Performed by: EMERGENCY MEDICINE

## 2018-11-24 PROCEDURE — 99284 EMERGENCY DEPT VISIT MOD MDM: CPT | Performed by: FAMILY MEDICINE

## 2018-11-24 PROCEDURE — 80048 BASIC METABOLIC PNL TOTAL CA: CPT | Performed by: EMERGENCY MEDICINE

## 2018-11-24 PROCEDURE — 93005 ELECTROCARDIOGRAM TRACING: CPT

## 2018-11-24 PROCEDURE — 85025 COMPLETE CBC W/AUTO DIFF WBC: CPT | Performed by: EMERGENCY MEDICINE

## 2018-11-24 PROCEDURE — 83880 ASSAY OF NATRIURETIC PEPTIDE: CPT | Performed by: EMERGENCY MEDICINE

## 2018-11-24 PROCEDURE — 99284 EMERGENCY DEPT VISIT MOD MDM: CPT

## 2018-11-24 PROCEDURE — 84484 ASSAY OF TROPONIN QUANT: CPT | Performed by: EMERGENCY MEDICINE

## 2018-11-24 PROCEDURE — 93010 ELECTROCARDIOGRAM REPORT: CPT | Performed by: INTERNAL MEDICINE

## 2018-11-24 PROCEDURE — 71046 X-RAY EXAM CHEST 2 VIEWS: CPT

## 2018-11-24 PROCEDURE — 93970 EXTREMITY STUDY: CPT

## 2018-11-24 PROCEDURE — 81025 URINE PREGNANCY TEST: CPT | Performed by: EMERGENCY MEDICINE

## 2018-11-24 PROCEDURE — G0383 LEV 4 HOSP TYPE B ED VISIT: HCPCS | Performed by: FAMILY MEDICINE

## 2018-11-24 PROCEDURE — 36415 COLL VENOUS BLD VENIPUNCTURE: CPT | Performed by: EMERGENCY MEDICINE

## 2018-11-24 PROCEDURE — 81001 URINALYSIS AUTO W/SCOPE: CPT

## 2018-11-24 RX ORDER — MELOXICAM 15 MG/1
15 TABLET ORAL
COMMUNITY
Start: 2018-11-12 | End: 2019-07-09

## 2018-11-24 NOTE — ED NOTES
Pt to ER c/o going to Urgent care and then bring sent here for evaluation of mikal lower leg edema  Pt states edema started 2 days ago with the onset of her period yesterday  Pt states she has been taking Midol and believes that has improved the edema  Pt also c/o rash under right breast which she feels is from sweat  Pt c/o "wheezing" when she lies down but lungs sounds clear bilaterally        Estella Banerjee RN  11/24/18 0545

## 2018-11-24 NOTE — DISCHARGE INSTRUCTIONS
Please follow-up with your primary care physician for sleep study to assess for possible sleep apnea  Leg Edema   WHAT YOU NEED TO KNOW:   Leg edema is swelling caused by fluid buildup  Your legs may swell if you sit or stand for long periods of time, are pregnant, or are injured  Swelling may also occur if you have heart failure or circulation problems  This means that your heart does not pump blood through your body as it should  DISCHARGE INSTRUCTIONS:   Self-care:   · Elevate your legs:  Raise your legs above the level of your heart as often as you can  This will help decrease swelling and pain  Prop your legs on pillows or blankets to keep them elevated comfortably  · Wear pressure stockings: These tight stockings put pressure on your legs to promote blood flow and prevent blood clots  Wear the stockings during the day  Do not wear them while you sleep  · Apply heat:  Heat helps decrease pain and swelling  Apply heat on the area for 20 to 30 minutes every 2 hours for as many days as directed  · Stay active:  Do not stand or sit for long periods of time  Ask your healthcare provider about the best exercise plan for you  · Eat healthy foods:  Healthy foods include fruits, vegetables, whole-grain breads, low-fat dairy products, beans, lean meats, and fish  Ask if you need to be on a special diet  Limit salt  Salt will make your body hold even more fluid  Follow up with your healthcare provider as directed:  Write down your questions so you remember to ask them during your visits  Contact your healthcare provider if:   · You have a fever or feel more tired than usual     · The veins in your legs look larger than usual  They may look full or bulging  · Your legs itch or feel heavy  · You have red or white areas or sores on your legs  The skin may also appear dimpled or have indentations  · You are gaining weight  · You have trouble moving your ankles      · The swelling does not go away, or other parts of your body swell  · You have questions or concerns about your condition or care  Return to the emergency department if:   · You cannot walk  · You feel faint or confused  · Your skin turns blue or gray  · Your leg feels warm, tender, and painful  It may be swollen and red  · You have chest pain or trouble breathing that is worse when you lie down  · You suddenly feel lightheaded and have trouble breathing  · You have new and sudden chest pain  You may have more pain when you take deep breaths or cough  You may also cough up blood  © 2017 [employer]  Information is for End User's use only and may not be sold, redistributed or otherwise used for commercial purposes  All illustrations and images included in CareNotes® are the copyrighted property of [employer]  or [employer]  The above information is an  only  It is not intended as medical advice for individual conditions or treatments  Talk to your doctor, nurse or pharmacist before following any medical regimen to see if it is safe and effective for you

## 2018-11-24 NOTE — PATIENT INSTRUCTIONS
Leg Edema   WHAT YOU NEED TO KNOW:   Leg edema is swelling caused by fluid buildup  Your legs may swell if you sit or stand for long periods of time, are pregnant, or are injured  Swelling may also occur if you have heart failure or circulation problems  This means that your heart does not pump blood through your body as it should  DISCHARGE INSTRUCTIONS:   Self-care:   · Elevate your legs:  Raise your legs above the level of your heart as often as you can  This will help decrease swelling and pain  Prop your legs on pillows or blankets to keep them elevated comfortably  · Wear pressure stockings: These tight stockings put pressure on your legs to promote blood flow and prevent blood clots  Wear the stockings during the day  Do not wear them while you sleep  · Apply heat:  Heat helps decrease pain and swelling  Apply heat on the area for 20 to 30 minutes every 2 hours for as many days as directed  · Stay active:  Do not stand or sit for long periods of time  Ask your healthcare provider about the best exercise plan for you  · Eat healthy foods:  Healthy foods include fruits, vegetables, whole-grain breads, low-fat dairy products, beans, lean meats, and fish  Ask if you need to be on a special diet  Limit salt  Salt will make your body hold even more fluid  Follow up with your healthcare provider as directed:  Write down your questions so you remember to ask them during your visits  Contact your healthcare provider if:   · You have a fever or feel more tired than usual     · The veins in your legs look larger than usual  They may look full or bulging  · Your legs itch or feel heavy  · You have red or white areas or sores on your legs  The skin may also appear dimpled or have indentations  · You are gaining weight  · You have trouble moving your ankles  · The swelling does not go away, or other parts of your body swell      · You have questions or concerns about your condition or care   Return to the emergency department if:   · You cannot walk  · You feel faint or confused  · Your skin turns blue or gray  · Your leg feels warm, tender, and painful  It may be swollen and red  · You have chest pain or trouble breathing that is worse when you lie down  · You suddenly feel lightheaded and have trouble breathing  · You have new and sudden chest pain  You may have more pain when you take deep breaths or cough  You may also cough up blood  © 2017 [employer]  Information is for End User's use only and may not be sold, redistributed or otherwise used for commercial purposes  All illustrations and images included in CareNotes® are the copyrighted property of [employer]  or [employer]  The above information is an  only  It is not intended as medical advice for individual conditions or treatments  Talk to your doctor, nurse or pharmacist before following any medical regimen to see if it is safe and effective for you

## 2018-11-24 NOTE — ED NOTES
Paged on-call Vascular Tech, Amalia Hendrickson, to advise of order  He will be in        Silvana Ellsworth RN  11/24/18 2691

## 2018-11-24 NOTE — ED PROVIDER NOTES
History  Chief Complaint   Patient presents with    Leg Swelling     Pt referred from Urgent care for B/L leg swelling, "Ibroke out on the right side of my chest and wheezing when I'm sleeping", denies fever/cough  66-year-old female presents for evaluation of 2 days of bilateral lower extremity edema and pain  Patient was a gradual onset of swelling, pressure-like heaviness in her bilateral lower extremities from the thighs to the feet  She states started gradually 2 days ago has been constant, getting progressively worse, without modifying factors  Patient states that she has no chest pain, shortness of breath, cough UR symptoms, fevers, chills, rash, abdominal pain, nausea vomiting fevers, chills, dyspnea on exertion, orthopnea  Patient states that she makes grunting noises in this her sleep and her significant other states she does appear to stop breathing  Patient has a history of lower extremity edema 10-11 years ago during pregnancy  She is on a oral contraceptive pill  History provided by:  Patient and significant other      Prior to Admission Medications   Prescriptions Last Dose Informant Patient Reported? Taking? ALAWAY 0 025 % ophthalmic solution   Yes No   SRONYX 0 1-20 MG-MCG per tablet   No No   Sig: TAKE 1 TABLET DAILY AS DIRECTED   acetaminophen (TYLENOL) 500 mg tablet   Yes No   Sig: Take 500 mg by mouth every 6 (six) hours as needed for mild pain   albuterol (2 5 mg/3 mL) 0 083 % nebulizer solution   Yes No   Sig: Inhale 2 5 mg every 6 (six) hours   albuterol (PROVENTIL HFA,VENTOLIN HFA) 90 mcg/act inhaler   Yes No   Sig: Inhale 2 puffs every 6 (six) hours as needed for wheezing   cetirizine (ZyrTEC) 10 mg tablet   Yes No   Sig: Take 10 mg by mouth daily  cyanocobalamin (VITAMIN B-12) 1,000 mcg tablet   Yes No   Sig: Take 1,000 mcg by mouth daily  fluticasone (FLONASE) 50 mcg/act nasal spray   Yes No   Si spray into each nostril daily     hydrOXYzine HCL (ATARAX) 10 mg tablet   Yes No   Sig: Take 25 mg by mouth every 6 (six) hours as needed for itching   ibuprofen (MOTRIN) 400 mg tablet   Yes No   Sig: Take by mouth every 6 (six) hours as needed for mild pain   meloxicam (MOBIC) 15 mg tablet   Yes No   Sig: Take 15 mg by mouth   pantoprazole (PROTONIX) 40 mg tablet   Yes No   Sig: Take 40 mg by mouth every morning        Facility-Administered Medications: None       Past Medical History:   Diagnosis Date    Abdominal hernia     Anemia     Anxiety     Asthma     Dermatitis     On neck    GERD (gastroesophageal reflux disease)     Hypertension     History of- no longer on medication    Obesity     Reflux esophagitis     Seasonal allergies     Wears partial dentures     Top       Past Surgical History:   Procedure Laterality Date     SECTION      x3    CHOLECYSTECTOMY      Laparoscopic    COLONOSCOPY N/A 3/11/2016    Procedure: COLONOSCOPY;  Surgeon: Khalida Piedra MD;  Location: [facility]; Service:     HERNIA REPAIR      ND LAP,DIAGNOSTIC ABDOMEN N/A 10/25/2017    Procedure: LAPAROSCOPY DIAGNOSTIC, HERNIA REPAIR WITH MESH  WITH LYSIS ADHESIONS;  Surgeon: Khalida Piedra MD;  Location: [facility];  Service: General       History reviewed  No pertinent family history  I have reviewed and agree with the history as documented  Social History   Substance Use Topics    Smoking status: Never Smoker    Smokeless tobacco: Never Used    Alcohol use No        Review of Systems   Constitutional: Negative for appetite change, diaphoresis, fatigue and fever  HENT: Negative for congestion, dental problem and rhinorrhea  Eyes: Negative for pain  Respiratory: Negative for chest tightness and shortness of breath  Cardiovascular: Positive for leg swelling  Negative for chest pain  Gastrointestinal: Negative for abdominal pain, blood in stool, constipation, diarrhea, nausea and vomiting  Endocrine: Negative for polydipsia, polyphagia and polyuria  Genitourinary: Negative for difficulty urinating, dyspareunia, dysuria, enuresis, flank pain, frequency, hematuria, pelvic pain, vaginal bleeding and vaginal discharge  Musculoskeletal: Negative for arthralgias, back pain and myalgias  Skin: Negative for color change and rash  Neurological: Negative for dizziness, weakness, light-headedness and headaches  Psychiatric/Behavioral: Negative for hallucinations and suicidal ideas  Physical Exam  Physical Exam   Constitutional: She is oriented to person, place, and time  She appears well-developed and well-nourished  HENT:   Mouth/Throat: No oropharyngeal exudate  TMs normal bilaterally no pharyngeal erythema no rhinorrhea nontender palpation of sinuses, normal looking turbinates   Eyes: Conjunctivae and EOM are normal    Neck: Normal range of motion  Neck supple  No meningeal signs   Cardiovascular: Normal rate, regular rhythm, normal heart sounds and intact distal pulses  Pulmonary/Chest: Effort normal and breath sounds normal  No respiratory distress  She has no wheezes  She has no rales  She exhibits no tenderness  Abdominal: Soft  Bowel sounds are normal  She exhibits no distension and no mass  There is no tenderness  No hernia  No cvat   Musculoskeletal: Normal range of motion  She exhibits edema  Lymphadenopathy:     She has no cervical adenopathy  Neurological: She is alert and oriented to person, place, and time  No cranial nerve deficit  Skin: No rash noted  No erythema  Psychiatric: She has a normal mood and affect  Her behavior is normal    Nursing note and vitals reviewed        Vital Signs  ED Triage Vitals [11/24/18 1143]   Temperature Pulse Respirations Blood Pressure SpO2   97 6 °F (36 4 °C) 82 20 (!) 206/89 98 %      Temp Source Heart Rate Source Patient Position - Orthostatic VS BP Location FiO2 (%)   Oral Monitor Sitting Right arm --      Pain Score       3           Vitals:    11/24/18 1143 11/24/18 1340 11/24/18 1633   BP: (!) 206/89 146/73 147/94   Pulse: 82 74 77   Patient Position - Orthostatic VS: Sitting Lying Lying       Visual Acuity      ED Medications  Medications - No data to display    Diagnostic Studies  Results Reviewed     Procedure Component Value Units Date/Time    Basic metabolic panel [[id number]] Collected:  11/24/18 1406    Lab Status:  Final result Specimen:  Blood from Arm, Left Updated:  11/24/18 1432     Sodium 141 mmol/L      Potassium 3 9 mmol/L      Chloride 105 mmol/L      CO2 26 mmol/L      ANION GAP 10 mmol/L      BUN 17 mg/dL      Creatinine 0 67 mg/dL      Glucose 93 mg/dL      Calcium 8 9 mg/dL      eGFR 112 ml/min/1 73sq m     Narrative:         National Kidney Disease Education Program recommendations are as follows:  GFR calculation is accurate only with a steady state creatinine  Chronic Kidney disease less than 60 ml/min/1 73 sq  meters  Kidney failure less than 15 ml/min/1 73 sq  meters      B-type natriuretic peptide [[id number]]  (Abnormal) Collected:  11/24/18 1406    Lab Status:  Final result Specimen:  Blood from Arm, Left Updated:  11/24/18 1432     NT-proBNP 147 (H) pg/mL     D-Dimer [[id number]]  (Abnormal) Collected:  11/24/18 1406    Lab Status:  Final result Specimen:  Blood from Arm, Left Updated:  11/24/18 1430     D-Dimer, Quant 923 (H) ng/ml (FEU)     Urine Microscopic [[id number]]  (Abnormal) Collected:  11/24/18 1411    Lab Status:  Final result Specimen:  Urine from Urine, Clean Catch Updated:  11/24/18 1430     RBC, UA None Seen /hpf      WBC, UA 0-1 (A) /hpf      Epithelial Cells Occasional /hpf      Bacteria, UA Occasional /hpf     Troponin I [[id number]]  (Normal) Collected:  11/24/18 1406    Lab Status:  Final result Specimen:  Blood from Arm, Left Updated:  11/24/18 1429     Troponin I <0 02 ng/mL     CBC and differential [[id number]]  (Abnormal) Collected:  11/24/18 1406    Lab Status:  Final result Specimen:  Blood from Arm, Left Updated:  11/24/18 1413     WBC 11 09 (H) Thousand/uL      RBC 4 30 Million/uL      Hemoglobin 11 0 (L) g/dL      Hematocrit 37 0 %      MCV 86 fL      MCH 25 6 (L) pg      MCHC 29 7 (L) g/dL      RDW 16 1 (H) %      MPV 9 5 fL      Platelets 475 Thousands/uL      nRBC 0 /100 WBCs      Neutrophils Relative 65 %      Immat GRANS % 1 %      Lymphocytes Relative 27 %      Monocytes Relative 5 %      Eosinophils Relative 2 %      Basophils Relative 0 %      Neutrophils Absolute 7 25 Thousands/µL      Immature Grans Absolute 0 06 Thousand/uL      Lymphocytes Absolute 2 98 Thousands/µL      Monocytes Absolute 0 57 Thousand/µL      Eosinophils Absolute 0 20 Thousand/µL      Basophils Absolute 0 03 Thousands/µL     POCT pregnancy, urine [[id number]]  (Normal) Resulted:  11/24/18 1359    Lab Status:  Final result Updated:  11/24/18 1359     EXT PREG TEST UR (Ref: Negative) HCG = neg (-)    POCT urinalysis dipstick [[id number]]  (Abnormal) Resulted:  11/24/18 1359    Lab Status:  Final result Specimen:  Urine Updated:  11/24/18 1359    ED Urine Macroscopic [[id number]]  (Abnormal) Collected:  11/24/18 1411    Lab Status:  Final result Specimen:  Urine Updated:  11/24/18 1357     Color, UA Yellow     Clarity, UA Clear     pH, UA 6 0     Leukocytes, UA Negative     Nitrite, UA Negative     Protein, UA Negative mg/dl      Glucose, UA Negative mg/dl      Ketones, UA Negative mg/dl      Urobilinogen, UA 0 2 E U /dl      Bilirubin, UA Negative     Blood, UA Moderate (A)     Specific [place], UA 1 015    Narrative:       CLINITEK RESULT                 XR chest 2 views   ED Interpretation by David Neely MD (11/24 1657)   Primary reviewed  No acute abnormality  VAS lower limb venous duplex study, complete bilateral   ED Interpretation by David Neely MD (11/24 1655)   Negative for DVT                   Procedures  ECG 12 Lead Documentation  Date/Time: 11/24/2018 2:31 PM  Performed by: Ernestina Angulo  Authorized by: Ernestina Angulo     ECG reviewed by me, the ED Provider: yes    Patient location:  ED  Rate:     ECG rate:  61    ECG rate assessment: normal    Rhythm:     Rhythm: sinus rhythm    Ectopy:     Ectopy: none    QRS:     QRS axis:  Normal    QRS intervals:  Normal  Conduction:     Conduction: normal    ST segments:     ST segments:  Normal  T waves:     T waves: normal             Phone Contacts  ED Phone Contact    ED Course  ED Course as of Nov 24 1659   Sat Nov 24, 2018   1510 Will do duplex to r/o dvt D-DIMER QUANTITATIVE: (!) [phone]   2566 Patient's evaluation for lower extremity edema is negative  Patient be discharged home on a low-salt diet, instructed to elevate legs are today, primary care physician follow-up  Patient has history concerning for sleep apnea which will follow up with her primary care physician for  MDM  Number of Diagnoses or Management Options  Diagnosis management comments: Lower extremity edema bilateral, symmetric without signs of infection    CritCare Time    Disposition  Final diagnoses:   Lower extremity edema     Time reflects when diagnosis was documented in both MDM as applicable and the Disposition within this note     Time User Action Codes Description Comment    11/24/2018  4:59 PM Derick Chapman Add [R60 0] Lower extremity edema       ED Disposition     ED Disposition Condition Comment    Discharge  Caron Mcintyre discharge to home/self care  Condition at discharge: Good        Follow-up Information     Follow up With Specialties Details Why Contact Info    Radha Grey DO Family Medicine Schedule an appointment as soon as possible for a visit in 2 days  [address]  [address] [facility] [address] [phone]            Patient's Medications   Discharge Prescriptions    No medications on file     No discharge procedures on file      ED Provider  Electronically Signed by           Kavya Melendez MD  11/24/18 9506

## 2018-11-25 PROCEDURE — 93970 EXTREMITY STUDY: CPT | Performed by: SURGERY

## 2018-11-26 ENCOUNTER — TELEPHONE (OUTPATIENT)
Dept: OBGYN CLINIC | Facility: MEDICAL CENTER | Age: 38
End: 2018-11-26

## 2018-11-26 NOTE — TELEPHONE ENCOUNTER
Pt  Calling stating she was seen in ED for leg swelling and advised to f/u with OB-Gyn in case it is r/t her OCP  Advised patient to f/u with PCP-has appt  On Wednesday of this week    Call back if PCP thinks it is r/t her OCP

## 2019-01-25 ENCOUNTER — DOCTOR'S OFFICE (OUTPATIENT)
Dept: URBAN - METROPOLITAN AREA CLINIC 136 | Facility: CLINIC | Age: 39
Setting detail: OPHTHALMOLOGY
End: 2019-01-25

## 2019-01-25 DIAGNOSIS — Z02.71: ICD-10-CM

## 2019-01-25 PROCEDURE — DISABILITY BUREAU OF DISABILITY REPORT: Performed by: OPTOMETRIST

## 2019-02-19 ENCOUNTER — DOCTOR'S OFFICE (OUTPATIENT)
Dept: URBAN - METROPOLITAN AREA CLINIC 136 | Facility: CLINIC | Age: 39
Setting detail: OPHTHALMOLOGY
End: 2019-02-19
Payer: COMMERCIAL

## 2019-02-19 ENCOUNTER — OPTICAL OFFICE (OUTPATIENT)
Dept: URBAN - METROPOLITAN AREA CLINIC 143 | Facility: CLINIC | Age: 39
Setting detail: OPHTHALMOLOGY
End: 2019-02-19
Payer: COMMERCIAL

## 2019-02-19 DIAGNOSIS — H52.223: ICD-10-CM

## 2019-02-19 DIAGNOSIS — H52.03: ICD-10-CM

## 2019-02-19 PROCEDURE — V2020 VISION SVCS FRAMES PURCHASES: HCPCS | Performed by: OPTOMETRIST

## 2019-02-19 PROCEDURE — V2103 SPHEROCYLINDR 4.00D/12-2.00D: HCPCS | Performed by: OPTOMETRIST

## 2019-02-19 PROCEDURE — 92014 COMPRE OPH EXAM EST PT 1/>: CPT | Performed by: OPTOMETRIST

## 2019-02-19 PROCEDURE — V2744 TINT PHOTOCHROMATIC LENS/ES: HCPCS | Performed by: OPTOMETRIST

## 2019-02-19 PROCEDURE — V2715 PRISM LENS/ES: HCPCS | Performed by: OPTOMETRIST

## 2019-02-19 PROCEDURE — V2750 ANTI-REFLECTIVE COATING: HCPCS | Performed by: OPTOMETRIST

## 2019-02-19 ASSESSMENT — REFRACTION_MANIFEST
OS_VA2: 20/20
OD_VA1: 20/
OS_SPHERE: +1.75
OD_AXIS: 105
OS_VA3: 20/
OD_VA2: 20/20
OD_CYLINDER: -1.50
OS_CYLINDER: -1.25
OS_VA3: 20/
OD_VA3: 20/
OD_VA1: 20/20
OS_VA1: 20/
OD_VA2: 20/
OS_VPRISM: BU
OU_VA: 20/20
OS_AXIS: 085
OU_VA: 20/
OD_VA3: 20/
OS_VA1: 20/20
OS_VA2: 20/
OD_SPHERE: +1.75

## 2019-02-19 ASSESSMENT — REFRACTION_AUTOREFRACTION
OD_SPHERE: +2.00
OS_AXIS: 086
OS_SPHERE: +1.75
OD_AXIS: 109
OD_CYLINDER: -1.50
OS_CYLINDER: -1.25

## 2019-02-19 ASSESSMENT — AXIALLENGTH_DERIVED
OS_AL: 22.4329
OD_AL: 22.4771
OD_AL: 22.389
OS_AL: 22.4329

## 2019-02-19 ASSESSMENT — REFRACTION_CURRENTRX
OS_OVR_VA: 20/
OD_OVR_VA: 20/
OS_CYLINDER: -0.75
OD_SPHERE: +1.00
OD_OVR_VA: 20/
OS_VPRISM_DIRECTION: SV
OS_OVR_VA: 20/
OS_SPHERE: +1.00
OS_AXIS: 083
OD_AXIS: 106
OD_OVR_VA: 20/
OD_CYLINDER: -1.25
OS_OVR_VA: 20/
OD_VPRISM_DIRECTION: SV

## 2019-02-19 ASSESSMENT — SPHEQUIV_DERIVED
OS_SPHEQUIV: 1.125
OS_SPHEQUIV: 1.125
OD_SPHEQUIV: 1.25
OD_SPHEQUIV: 1

## 2019-02-19 ASSESSMENT — TEAR BREAK UP TIME (TBUT)
OD_TBUT: 5S
OS_TBUT: 5S

## 2019-02-19 ASSESSMENT — DECREASING TEAR LAKE - SEVERITY SCORE
OD_DEC_TEARLAKE: 1+
OS_DEC_TEARLAKE: 1+

## 2019-02-19 ASSESSMENT — VISUAL ACUITY
OS_BCVA: 20/20
OD_BCVA: 20/20-1

## 2019-02-19 ASSESSMENT — KERATOMETRY
OD_AXISANGLE_DEGREES: 119
OD_K1POWER_DIOPTERS: 45.50
OS_K1POWER_DIOPTERS: 45.25
OS_K2POWER_DIOPTERS: 46.00
OD_K2POWER_DIOPTERS: 45.75
OS_AXISANGLE_DEGREES: 078

## 2019-02-19 ASSESSMENT — CONFRONTATIONAL VISUAL FIELD TEST (CVF)
OS_FINDINGS: FULL
OD_FINDINGS: FULL

## 2019-02-19 ASSESSMENT — SUPERFICIAL PUNCTATE KERATITIS (SPK)
OD_SPK: T 1+
OS_SPK: T 1+

## 2019-03-04 ENCOUNTER — OFFICE VISIT (OUTPATIENT)
Dept: OBGYN CLINIC | Facility: MEDICAL CENTER | Age: 39
End: 2019-03-04
Payer: COMMERCIAL

## 2019-03-04 VITALS — BODY MASS INDEX: 45.1 KG/M2 | WEIGHT: 223.3 LBS | SYSTOLIC BLOOD PRESSURE: 118 MMHG | DIASTOLIC BLOOD PRESSURE: 72 MMHG

## 2019-03-04 DIAGNOSIS — N64.4 BREAST TENDERNESS IN FEMALE: Primary | ICD-10-CM

## 2019-03-04 PROCEDURE — 99213 OFFICE O/P EST LOW 20 MIN: CPT | Performed by: OBSTETRICS & GYNECOLOGY

## 2019-03-04 RX ORDER — CYCLOSPORINE 0.5 MG/ML
1 EMULSION OPHTHALMIC EVERY 12 HOURS
COMMUNITY

## 2019-03-04 NOTE — PROGRESS NOTES
Assessment:   There are no diagnoses linked to this encounter  Plan:    Subjective:    Patient is a 45 y o  y o  Female who presents for a problem visit  Pt is c/o + breast pain and no breast lump  Sx's are in the bilateral side  Sx's started 2 months ago  Patient reports that sx's are not related to her menses  Pt reports no changes to her diet  She drinks minimal caffeinated products daily  Pt reports no breast trauma  Pt reports no changes in diet and activity  Pt reports no nipple discharge  She had an allergic reaction to mobic in November  States has been working on her health in the meantime, getting injections for post concussion syndrome     Patient Active Problem List   Diagnosis    Obesity (BMI 35 0-39 9 without comorbidity)       Past Medical History:   Diagnosis Date    Abdominal hernia     Anemia     Anxiety     Asthma     Dermatitis     On neck    GERD (gastroesophageal reflux disease)     Hypertension     History of- no longer on medication    Obesity     Reflux esophagitis     Seasonal allergies     Wears partial dentures     Top       Past Surgical History:   Procedure Laterality Date     SECTION      x3    CHOLECYSTECTOMY      Laparoscopic    COLONOSCOPY N/A 3/11/2016    Procedure: COLONOSCOPY;  Surgeon: Trang Chavez MD;  Location[facility]; Service:     HERNIA REPAIR      [place] LAP,DIAGNOSTIC ABDOMEN N/A 10/25/2017    Procedure: LAPAROSCOPY DIAGNOSTIC, HERNIA REPAIR WITH MESH  WITH LYSIS ADHESIONS;  Surgeon: Trang Chavez MD;  [facility];  Service: General       No family history on file      Social History     Socioeconomic History    Marital status: /Civil Union     Spouse name: Not on file    Number of children: Not on file    Years of education: Not on file    Highest education level: Not on file   Occupational History    Not on file   Social Needs    Financial resource strain: Not on file    Food insecurity:     Worry: Not on file     Inability: Not on file    Transportation needs:     Medical: Not on file     Non-medical: Not on file   Tobacco Use    Smoking status: Never Smoker    Smokeless tobacco: Never Used   Substance and Sexual Activity    Alcohol use: No    Drug use: No    Sexual activity: Yes     Partners: Male     Birth control/protection: OCP   Lifestyle    Physical activity:     Days per week: Not on file     Minutes per session: Not on file    Stress: Not on file   Relationships    Social connections:     Talks on phone: Not on file     Gets together: Not on file     Attends Restorationism service: Not on file     Active member of club or organization: Not on file     Attends meetings of clubs or organizations: Not on file     Relationship status: Not on file    Intimate partner violence:     Fear of current or ex partner: Not on file     Emotionally abused: Not on file     Physically abused: Not on file     Forced sexual activity: Not on file   Other Topics Concern    Not on file   Social History Narrative    Not on file         Current Outpatient Medications:     acetaminophen (TYLENOL) 500 mg tablet, Take 500 mg by mouth every 6 (six) hours as needed for mild pain, Disp: , Rfl:     albuterol (PROVENTIL HFA,VENTOLIN HFA) 90 mcg/act inhaler, Inhale 2 puffs every 6 (six) hours as needed for wheezing, Disp: , Rfl:     cetirizine (ZyrTEC) 10 mg tablet, Take 10 mg by mouth daily  , Disp: , Rfl:     cyanocobalamin (VITAMIN B-12) 1,000 mcg tablet, Take 1,000 mcg by mouth daily  , Disp: , Rfl:     cycloSPORINE (RESTASIS) 0 05 % ophthalmic emulsion, Daily, Disp: , Rfl:     ferrous sulfate 325 (65 Fe) mg tablet, Take 1 tablet by mouth daily with breakfast, Disp: , Rfl: 5    fluticasone (FLONASE) 50 mcg/act nasal spray, 1 spray into each nostril daily  , Disp: , Rfl:     pantoprazole (PROTONIX) 40 mg tablet, Take 40 mg by mouth every morning  , Disp: , Rfl:     SRONYX 0 1-20 MG-MCG per tablet, TAKE 1 TABLET DAILY AS DIRECTED, Disp: 28 tablet, Rfl: 3    ALAWAY 0 025 % ophthalmic solution, , Disp: , Rfl:     CARAFATE 1 GM/10ML suspension, TAKE 10 MILLILITER THREE TIMES DAILY, Disp: , Rfl: 3    hydrOXYzine HCL (ATARAX) 10 mg tablet, Take 25 mg by mouth every 6 (six) hours as needed for itching, Disp: , Rfl:     ibuprofen (MOTRIN) 400 mg tablet, Take by mouth every 6 (six) hours as needed for mild pain, Disp: , Rfl:     meloxicam (MOBIC) 15 mg tablet, Take 15 mg by mouth, Disp: , Rfl:     Allergies   Allergen Reactions    Advil Sinus Congestion & Pain [Phenylephrine-Ibuprofen] Hives    Ibuprofen Hives    Meloxicam     Benadryl [Diphenhydramine] Other (See Comments)     palpatations    Nystatin Hives    Wound Dressings Rash       Review of Systems  Constitutional :no fever, feels well, no tiredness, no recent weight gain or loss  ENT: no ear ache, no loss of hearing, no nosebleeds or nasal discharge, no sore throat or hoarseness  Cardiovascular: no complaints of slow or fast heart beat, no chest pain, no palpitations, no leg claudication or lower extremity edema  Respiratory: no complaints of shortness of shortness of breath, no AVENDANO  Breasts:no complaints of breast pain, breast lump, or nipple discharge  Gastrointestinal: no complaints of abdominal pain, constipation, nausea, vomiting, or diarrhea or bloody stools  Genitourinary : no complaints of dysuria, incontinence, pelvic pain, no dysmenorrhea, vaginal discharge or abnormal vaginal bleeding and as noted in HPI  Musculoskeletal: no complaints of arthralgia, no myalgia, no joint swelling or      stiffness, no limb pain or swelling  Integumentary: no complaints of skin rash or lesion, itching or dry skin  Neurological: no complaints of headache, no confusion, no numbness or tingling, no dizziness or fainting    Constitutional   General appearance: No acute distress, well appearing and well nourished      Psychiatric   Orientation to person, place, and time: Normal     Mood and affect: Normal     Breast  Breasts: breasts appear normal, no suspicious masses, no skin or nipple changes or axillary nodes, symmetric fibrous changes in both upper outer quadrants    Patient reassured, recommended evening primrose oil    Reevaluate at annual exam in June 2019

## 2019-03-13 ENCOUNTER — OFFICE VISIT (OUTPATIENT)
Dept: URGENT CARE | Facility: MEDICAL CENTER | Age: 39
End: 2019-03-13
Payer: COMMERCIAL

## 2019-03-13 VITALS
BODY MASS INDEX: 45.56 KG/M2 | TEMPERATURE: 97.6 F | RESPIRATION RATE: 16 BRPM | HEIGHT: 59 IN | HEART RATE: 78 BPM | OXYGEN SATURATION: 97 % | SYSTOLIC BLOOD PRESSURE: 126 MMHG | DIASTOLIC BLOOD PRESSURE: 62 MMHG | WEIGHT: 226 LBS

## 2019-03-13 DIAGNOSIS — J04.0 ACUTE LARYNGITIS: Primary | ICD-10-CM

## 2019-03-13 PROCEDURE — G0382 LEV 3 HOSP TYPE B ED VISIT: HCPCS | Performed by: FAMILY MEDICINE

## 2019-03-13 PROCEDURE — 99213 OFFICE O/P EST LOW 20 MIN: CPT | Performed by: FAMILY MEDICINE

## 2019-03-13 PROCEDURE — 99283 EMERGENCY DEPT VISIT LOW MDM: CPT | Performed by: FAMILY MEDICINE

## 2019-03-13 RX ORDER — PREDNISONE 50 MG/1
50 TABLET ORAL DAILY
Qty: 5 TABLET | Refills: 0 | Status: SHIPPED | OUTPATIENT
Start: 2019-03-13 | End: 2019-03-18

## 2019-03-13 RX ORDER — DEXTROMETHORPHAN POLISTIREX 30 MG/5ML
5 SUSPENSION ORAL EVERY 8 HOURS PRN
Qty: 148 ML | Refills: 0 | Status: SHIPPED | OUTPATIENT
Start: 2019-03-13

## 2019-03-13 NOTE — PROGRESS NOTES
[facility]        NAME: Caron Mcintyre is a 45 y o  female  : 1980    MRN: [id number]  DATE: 2019  TIME: 9:24 AM    Assessment and Plan   Acute laryngitis [J04 0]  1  Acute laryngitis  predniSONE 50 mg tablet         Patient Instructions     Delsym for cough  Follow up with PCP in 3-5 days  Proceed to  ER if symptoms worsen  Chief Complaint     Chief Complaint   Patient presents with    Cold Like Symptoms     Patient relates started with URI symptoms for 2 days  Cough, congestion and sore throat  Denies fever  History of Present Illness       Cold Like Symptoms (Patient relates started with URI symptoms for 2 days  Cough, congestion and sore throat  Denies fever  )  Patient lost her voice this morning  URI    This is a new problem  The current episode started in the past 7 days  Associated symptoms include congestion, coughing, a sore throat and swollen glands  Review of Systems   Review of Systems   Constitutional: Positive for chills  HENT: Positive for congestion and sore throat  Respiratory: Positive for cough  Cardiovascular: Negative  Current Medications       Current Outpatient Medications:     acetaminophen (TYLENOL) 500 mg tablet, Take 500 mg by mouth every 6 (six) hours as needed for mild pain, Disp: , Rfl:     ALAWAY 0 025 % ophthalmic solution, , Disp: , Rfl:     cetirizine (ZyrTEC) 10 mg tablet, Take 10 mg by mouth daily  , Disp: , Rfl:     cyanocobalamin (VITAMIN B-12) 1,000 mcg tablet, Take 1,000 mcg by mouth daily  , Disp: , Rfl:     cycloSPORINE (RESTASIS) 0 05 % ophthalmic emulsion, Daily, Disp: , Rfl:     ferrous sulfate 325 (65 Fe) mg tablet, Take 1 tablet by mouth daily with breakfast, Disp: , Rfl: 5    fluticasone (FLONASE) 50 mcg/act nasal spray, 1 spray into each nostril daily  , Disp: , Rfl:     pantoprazole (PROTONIX) 40 mg tablet, Take 40 mg by mouth every morning  , Disp: , Rfl:     SRONYX 0 1-20 MG-MCG per tablet, TAKE 1 TABLET DAILY AS DIRECTED, Disp: 28 tablet, Rfl: 3    albuterol (PROVENTIL HFA,VENTOLIN HFA) 90 mcg/act inhaler, Inhale 2 puffs every 6 (six) hours as needed for wheezing, Disp: , Rfl:     CARAFATE 1 GM/10ML suspension, TAKE 10 MILLILITER THREE TIMES DAILY, Disp: , Rfl: 3    hydrOXYzine HCL (ATARAX) 10 mg tablet, Take 25 mg by mouth every 6 (six) hours as needed for itching, Disp: , Rfl:     ibuprofen (MOTRIN) 400 mg tablet, Take by mouth every 6 (six) hours as needed for mild pain, Disp: , Rfl:     meloxicam (MOBIC) 15 mg tablet, Take 15 mg by mouth, Disp: , Rfl:     predniSONE 50 mg tablet, Take 1 tablet (50 mg total) by mouth daily for 5 days, Disp: 5 tablet, Rfl: 0    Current Allergies     Allergies as of 2019 - Reviewed 2019   Allergen Reaction Noted    Advil sinus congestion & pain [phenylephrine-ibuprofen] Hives 10/18/2017    Ibuprofen Hives 2018    Meloxicam  2019    Benadryl [diphenhydramine] Other (See Comments) 2016    Nystatin Hives 2016    Wound dressings Rash 2017            The following portions of the patient's history were reviewed and updated as appropriate: allergies, current medications, past family history, past medical history, past social history, past surgical history and problem list      Past Medical History:   Diagnosis Date    Abdominal hernia     Anemia     Anxiety     Asthma     Dermatitis     On neck    GERD (gastroesophageal reflux disease)     Hypertension     History of- no longer on medication    Obesity     Reflux esophagitis     Seasonal allergies     Wears partial dentures     Top       Past Surgical History:   Procedure Laterality Date     SECTION      x3    CHOLECYSTECTOMY      Laparoscopic    COLONOSCOPY N/A 3/11/2016    Procedure: COLONOSCOPY;  Surgeon: Idania Thibodeaux MD;  Location: [facility];   Service:     HERNIA REPAIR      [place] LAP,DIAGNOSTIC ABDOMEN N/A 10/25/2017    Procedure: LAPAROSCOPY DIAGNOSTIC, HERNIA REPAIR WITH MESH  WITH LYSIS ADHESIONS;  Surgeon: Erna Osei MD;  [facility];  Service: General       No family history on file  Medications have been verified  Objective   /62   Pulse 78   Temp 97 6 °F (36 4 °C) (Tympanic)   Resp 16   Ht 4' 11" (1 499 m)   Wt 103 kg (226 lb)   LMP 02/12/2019   SpO2 97%   BMI 45 65 kg/m²        Physical Exam     Physical Exam   Constitutional: She is oriented to person, place, and time  She appears well-developed and well-nourished  She appears ill  HENT:   Right Ear: External ear normal    Left Ear: External ear normal    Nose: Nose normal    Mouth/Throat: Oropharynx is clear and moist  No oropharyngeal exudate  Eyes: Conjunctivae are normal    Neck: Normal range of motion  Neck supple  Cardiovascular: Normal rate, regular rhythm and normal heart sounds  No murmur heard  Pulmonary/Chest: Effort normal and breath sounds normal  No respiratory distress  She has no wheezes  She has no rales  She exhibits no tenderness  Abdominal: Soft  Musculoskeletal: Normal range of motion  Lymphadenopathy:     She has no cervical adenopathy  Neurological: She is alert and oriented to person, place, and time  Skin: Skin is warm  No rash noted  No erythema

## 2019-03-13 NOTE — LETTER
March 13, 2019     Patient: Kevin Gabriel   YOB: 1980   Date of Visit: 3/13/2019       To Whom it May Concern:    Frankie Espinosa was seen in my clinic on 3/13/2019  If you have any questions or concerns, please don't hesitate to call           Sincerely,          Kendrick Lara DO        CC: No Recipients

## 2019-03-13 NOTE — PATIENT INSTRUCTIONS
Delsym for cough  Follow up with PCP in 3-5 days  Proceed to  ER if symptoms worsen  Laryngitis   WHAT YOU NEED TO KNOW:   Laryngitis is when your larynx is swollen because of an infection or irritation  The larynx is also called the voice box because it contains your vocal cords  Your vocal cords also swell and change shape, which can cause your voice to sound different  DISCHARGE INSTRUCTIONS:   Take your medicine as directed  Contact your healthcare provider if you think your medicine is not helping or if you have side effects  Tell him of her if you are allergic to any medicine  Keep a list of the medicines, vitamins, and herbs you take  Include the amounts, and when and why you take them  Bring the list or the pill bottles to follow-up visits  Carry your medicine list with you in case of an emergency  Prevent laryngitis:   · Rest your voice:  Do not shout or scream if you get laryngitis often  This will help prevent swelling and irritation of your larynx  · Avoid irritants and harmful substances:  Do not breathe in chemicals or allergens, such as pollen  Alcohol and tobacco can also irritate your larynx  · Avoid foods and liquids that can cause acid reflux: These may include carbonated drinks, spicy foods and sauces, citrus fruit, peppermint, and chocolate  · Avoid the spread of germs:        [facility] your hands often with soap and water  Carry germ-killing gel with you  You can use the gel to clean your hands when there is no soap and water available  ¨ Do not touch your eyes, nose, or mouth unless you have washed your hands first     ¨ Always cover your mouth when you cough  Cough into a tissue or your shirtsleeve so you do not spread germs from your hands  ¨ Try to avoid people who have a cold or the flu  If you are sick, stay away from others as much as possible    Follow up with your healthcare provider as directed:  Write down your questions so you remember to ask them during your visits  Contact your healthcare provider if:   · You have a fever  · You feel large, tender lumps in your neck  · You are hoarse for more than 7 days  · You have new or increased throat pain  · You have questions about your condition or care  Return to the emergency department if:   · Your throat is bleeding  · You are hoarse for more than 7 days and your chest feels tight  · You are drooling and have trouble swallowing  · You have trouble breathing  © 2017 [facility] Information is for End User's use only and may not be sold, redistributed or otherwise used for commercial purposes  All illustrations and images included in CareNotes® are the copyrighted property of A D A M , Inc  or Vinicius Sandoval  The above information is an  only  It is not intended as medical advice for individual conditions or treatments  Talk to your doctor, nurse or pharmacist before following any medical regimen to see if it is safe and effective for you

## 2019-05-23 DIAGNOSIS — Z30.41 ENCOUNTER FOR SURVEILLANCE OF CONTRACEPTIVE PILLS: ICD-10-CM

## 2019-05-24 RX ORDER — LEVONORGESTREL AND ETHINYL ESTRADIOL 0.1-0.02MG
KIT ORAL
Qty: 28 TABLET | Refills: 11 | Status: SHIPPED | OUTPATIENT
Start: 2019-05-24 | End: 2019-07-09

## 2019-07-09 ENCOUNTER — ANNUAL EXAM (OUTPATIENT)
Dept: OBGYN CLINIC | Facility: MEDICAL CENTER | Age: 39
End: 2019-07-09
Payer: COMMERCIAL

## 2019-07-09 VITALS — WEIGHT: 251.2 LBS | DIASTOLIC BLOOD PRESSURE: 62 MMHG | SYSTOLIC BLOOD PRESSURE: 125 MMHG | BODY MASS INDEX: 50.74 KG/M2

## 2019-07-09 DIAGNOSIS — Z01.419 ENCOUNTER FOR WELL WOMAN EXAM WITH ROUTINE GYNECOLOGICAL EXAM: Primary | ICD-10-CM

## 2019-07-09 DIAGNOSIS — Z30.41 ENCOUNTER FOR SURVEILLANCE OF CONTRACEPTIVE PILLS: ICD-10-CM

## 2019-07-09 PROCEDURE — 99395 PREV VISIT EST AGE 18-39: CPT | Performed by: OBSTETRICS & GYNECOLOGY

## 2019-07-09 RX ORDER — LEVONORGESTREL AND ETHINYL ESTRADIOL 0.1-0.02MG
1 KIT ORAL DAILY
Qty: 28 TABLET | Refills: 11 | Status: SHIPPED | OUTPATIENT
Start: 2019-07-09 | End: 2020-07-06

## 2019-07-09 RX ORDER — GABAPENTIN 100 MG/1
CAPSULE ORAL
Refills: 0 | COMMUNITY
Start: 2019-06-21 | End: 2022-03-11 | Stop reason: ALTCHOICE

## 2019-07-09 NOTE — LETTER
July 9, 2019     Meg Carbajal MD  [address] [facility]    Patient: Justyn Isbell   YOB: 1980   Date of Visit: 7/9/2019       Dear Dr Juany Ricks: Thank you for referring Leeroy Concepcion to me for evaluation  Below are my notes for this consultation  If you have questions, please do not hesitate to call me  I look forward to following your patient along with you  Sincerely,        Martin Barney MD        CC: No Recipients  Martin Barney MD  7/9/2019  9:31 AM  Sign at close encounter  ASSESSMENT & PLAN: Justyn Isbell is a 45 y o  [id number] with normal gynecologic exam     1   Routine well woman exam done today  2  Pap and HPV:  The patient's last pap and hpv was 2014, pap done 2017  It was normal     Pap and cotesting was not done today  Due 2020  Current ASCCP Guidelines reviewed  3   The following were reviewed in today's visit: breast self exam   4  Continue OCPs    CC:  Annual Gynecologic Examination    HPI: Justyn Isbell is a 45 y o  [id number] who presents for annual gynecologic examination  She has the following concerns:  No GYN complaints; Interested in sleeve gastrectomy  - will start process with [facility]    The following portions of the patient's history were reviewed and updated as appropriate: allergies, current medications, past family history, past medical history, past social history, past surgical history and problem list     Health Maintenance:    She wears her seatbelt routinely  She does perform regular monthly self breast exams  She feels safe at home       Past Medical History:   Diagnosis Date    Abdominal hernia     Anemia     Anxiety     Asthma     Dermatitis     On neck    GERD (gastroesophageal reflux disease)     Hypertension     History of- no longer on medication    Obesity     Reflux esophagitis     Seasonal allergies     Wears partial dentures     Top       Past Surgical History:   Procedure Laterality Date     SECTION      x3    CHOLECYSTECTOMY      Laparoscopic    COLONOSCOPY N/A 3/11/2016    Procedure: COLONOSCOPY;  Surgeon: Corina Eisenmenger, MD;  Location: [facility]; Service:     HERNIA REPAIR      [place] LAP,DIAGNOSTIC ABDOMEN N/A 10/25/2017    Procedure: LAPAROSCOPY DIAGNOSTIC, HERNIA REPAIR WITH MESH  WITH LYSIS ADHESIONS;  Surgeon: Corina Eisenmenger, MD;  [facility];  Service: General       Past OB/Gyn History:  OB History        5    Para   4    Term   4            AB   1    Living   4       SAB   1    TAB        Ectopic        Multiple        Live Births   4               Pt does not have menstrual issues  History of sexually transmitted infection: No   History of abnormal pap smears: No      Patient is currently sexually active  heterosexual   The current method of family planning is OCP (estrogen/progesterone)      Family History   Problem Relation Age of Onset    Skin cancer Mother     Kidney cancer Father     Skin cancer Father     Diabetes Father        Social History:  Social History     Socioeconomic History    Marital status: /Civil Union     Spouse name: Not on file    Number of children: Not on file    Years of education: Not on file    Highest education level: Not on file   Occupational History    Not on file   Social Needs    Financial resource strain: Not on file    Food insecurity:     Worry: Not on file     Inability: Not on file    Transportation needs:     Medical: Not on file     Non-medical: Not on file   Tobacco Use    Smoking status: Never Smoker    Smokeless tobacco: Never Used   Substance and Sexual Activity    Alcohol use: No    Drug use: No    Sexual activity: Yes     Partners: Male     Birth control/protection: OCP   Lifestyle    Physical activity:     Days per week: Not on file     Minutes per session: Not on file    Stress: Not on file   Relationships    Social connections:     Talks on phone: Not on file Gets together: Not on file     Attends Hinduism service: Not on file     Active member of club or organization: Not on file     Attends meetings of clubs or organizations: Not on file     Relationship status: Not on file    Intimate partner violence:     Fear of current or ex partner: Not on file     Emotionally abused: Not on file     Physically abused: Not on file     Forced sexual activity: Not on file   Other Topics Concern    Not on file   Social History Narrative    Not on file         Allergies   Allergen Reactions    Advil Sinus Congestion & Pain [Phenylephrine-Ibuprofen] Hives    Ibuprofen Hives    Meloxicam     Benadryl [Diphenhydramine] Other (See Comments)     palpatations    Nystatin Hives    Wound Dressings Rash         Current Outpatient Medications:     acetaminophen (TYLENOL) 500 mg tablet, Take 500 mg by mouth every 6 (six) hours as needed for mild pain, Disp: , Rfl:     albuterol (PROVENTIL HFA,VENTOLIN HFA) 90 mcg/act inhaler, Inhale 2 puffs every 6 (six) hours as needed for wheezing, Disp: , Rfl:     cetirizine (ZyrTEC) 10 mg tablet, Take 10 mg by mouth daily  , Disp: , Rfl:     cyanocobalamin (VITAMIN B-12) 1,000 mcg tablet, Take 1,000 mcg by mouth daily  , Disp: , Rfl:     cycloSPORINE (RESTASIS) 0 05 % ophthalmic emulsion, Daily, Disp: , Rfl:     ferrous sulfate 325 (65 Fe) mg tablet, Take 1 tablet by mouth daily with breakfast, Disp: , Rfl: 5    fluticasone (FLONASE) 50 mcg/act nasal spray, 1 spray into each nostril daily  , Disp: , Rfl:     hydrOXYzine HCL (ATARAX) 10 mg tablet, Take 25 mg by mouth every 6 (six) hours as needed for itching, Disp: , Rfl:     pantoprazole (PROTONIX) 40 mg tablet, Take 40 mg by mouth every morning  , Disp: , Rfl:     SRONYX 0 1-20 MG-MCG per tablet, TAKE 1 TABLET DAILY AS DIRECTED, Disp: 28 tablet, Rfl: 3    ALAWAY 0 025 % ophthalmic solution, , Disp: , Rfl:     CARAFATE 1 GM/10ML suspension, TAKE 10 MILLILITER THREE TIMES DAILY, Disp: , Rfl: 3    Dextromethorphan Polistirex ER (DELSYM) 30 MG/5ML SUER, Take 5 mL (30 mg total) by mouth every 8 (eight) hours as needed (cough) (Patient not taking: Reported on 7/9/2019), Disp: 148 mL, Rfl: 0    gabapentin (NEURONTIN) 100 mg capsule, TAKE ONE CAPSULE IN THE MORNING AND TAKE 2 CAPSULES AT BEDTIME, Disp: , Rfl: 0    ibuprofen (MOTRIN) 400 mg tablet, Take by mouth every 6 (six) hours as needed for mild pain, Disp: , Rfl:       Review of Systems  Constitutional :no fever, feels well, no tiredness, no recent weight gain or loss  ENT: no ear ache, no loss of hearing, no nosebleeds or nasal discharge, no sore throat or hoarseness  Cardiovascular: no complaints of slow or fast heart beat, no chest pain, no palpitations, no leg claudication or lower extremity edema  Respiratory: no complaints of shortness of shortness of breath, no AVENDANO  Breasts:no complaints of breast pain, breast lump, or nipple discharge  Gastrointestinal: no complaints of abdominal pain, constipation, nausea, vomiting, or diarrhea or bloody stools  Genitourinary : no complaints of dysuria, incontinence, pelvic pain, no dysmenorrhea, vaginal discharge or abnormal vaginal bleeding and as noted in HPI  Musculoskeletal: no complaints of arthralgia, no myalgia, no joint swelling or stiffness, no limb pain or swelling    Integumentary: no complaints of skin rash or lesion, itching or dry skin  Neurological: no complaints of headache, no confusion, no numbness or tingling, no dizziness or fainting    Objective      /62   Wt 114 kg (251 lb 3 2 oz)   LMP 07/04/2019 (Exact Date)   BMI 50 74 kg/m²    General:   appears stated age, cooperative, alert normal mood and affect   Neck: normal, supple,trachea midline, no masses   Heart: regular rate and rhythm, S1, S2 normal, no murmur, click, rub or gallop   Lungs: clear to auscultation bilaterally   Breasts: normal appearance, no masses or tenderness, Inspection negative, No nipple retraction or dimpling, No nipple discharge or bleeding, No axillary or supraclavicular adenopathy, Normal to palpation without dominant masses   Abdomen: soft, non-tender, without masses or organomegaly   Vulva: Bartholin's, Urethra, [place] normal, no lesions, normal female hair distribution   Vagina: normal vagina, no discharge, exudate, lesion, or erythema   Urethra: normal   Cervix: Normal, no discharge  Uterus: normal size, contour, position, consistency, mobility, non-tender   Adnexa: normal adnexa   Lymphatic palpation of lymph nodes in neck, axilla, groin and/or other locations: no lymphadenopathy or masses noted   Skin normal skin turgor and no rashes     Psychiatric orientation to person, place, and time: normal  mood and affect: normal

## 2019-07-09 NOTE — PATIENT INSTRUCTIONS
Thank you for your confidence in our team    We appreciate you and welcome your feedback  If you receive a survey from us, please take a few moments to let us know how we are doing     Sincerely,   Rachel Ramires MD

## 2019-07-09 NOTE — PROGRESS NOTES
ASSESSMENT & PLAN: Kaye Krishna is a 45 y o  [id number] with normal gynecologic exam     1   Routine well woman exam done today  2  Pap and HPV:  The patient's last pap and hpv was , pap done 2017  It was normal     Pap and cotesting was not done today  Due 2020  Current ASCCP Guidelines reviewed  3   The following were reviewed in today's visit: breast self exam   4  Continue OCPs    CC:  Annual Gynecologic Examination    HPI: Kaye Krishna is a 45 y o  [id number] who presents for annual gynecologic examination  She has the following concerns:  No GYN complaints; Interested in sleeve gastrectomy  - will start process with [facility]    The following portions of the patient's history were reviewed and updated as appropriate: allergies, current medications, past family history, past medical history, past social history, past surgical history and problem list     Health Maintenance:    She wears her seatbelt routinely  She does perform regular monthly self breast exams  She feels safe at home  Past Medical History:   Diagnosis Date    Abdominal hernia     Anemia     Anxiety     Asthma     Dermatitis     On neck    GERD (gastroesophageal reflux disease)     Hypertension     History of- no longer on medication    Obesity     Reflux esophagitis     Seasonal allergies     Wears partial dentures     Top       Past Surgical History:   Procedure Laterality Date     SECTION      x3    CHOLECYSTECTOMY      Laparoscopic    COLONOSCOPY N/A 3/11/2016    Procedure: COLONOSCOPY;  Surgeon: Michele Jj MD;  Location: [facility];   Service:     HERNIA REPAIR      MO LAP,DIAGNOSTIC ABDOMEN N/A 10/25/2017    Procedure: LAPAROSCOPY DIAGNOSTIC, HERNIA REPAIR WITH MESH  WITH LYSIS ADHESIONS;  Surgeon: Michele Jj MD;  Location: [facility];  Service: General       Past OB/Gyn History:  OB History        5    Para   4    Term   4            AB   1    Living   4       SAB 1    TAB        Ectopic        Multiple        Live Births   4               Pt does not have menstrual issues  History of sexually transmitted infection: No   History of abnormal pap smears: No      Patient is currently sexually active  heterosexual   The current method of family planning is OCP (estrogen/progesterone)      Family History   Problem Relation Age of Onset    Skin cancer Mother     Kidney cancer Father     Skin cancer Father     Diabetes Father        Social History:  Social History     Socioeconomic History    Marital status: /Civil Union     Spouse name: Not on file    Number of children: Not on file    Years of education: Not on file    Highest education level: Not on file   Occupational History    Not on file   Social Needs    Financial resource strain: Not on file    Food insecurity:     Worry: Not on file     Inability: Not on file    Transportation needs:     Medical: Not on file     Non-medical: Not on file   Tobacco Use    Smoking status: Never Smoker    Smokeless tobacco: Never Used   Substance and Sexual Activity    Alcohol use: No    Drug use: No    Sexual activity: Yes     Partners: Male     Birth control/protection: OCP   Lifestyle    Physical activity:     Days per week: Not on file     Minutes per session: Not on file    Stress: Not on file   Relationships    Social connections:     Talks on phone: Not on file     Gets together: Not on file     Attends Jain service: Not on file     Active member of club or organization: Not on file     Attends meetings of clubs or organizations: Not on file     Relationship status: Not on file    Intimate partner violence:     Fear of current or ex partner: Not on file     Emotionally abused: Not on file     Physically abused: Not on file     Forced sexual activity: Not on file   Other Topics Concern    Not on file   Social History Narrative    Not on file         Allergies   Allergen Reactions    Advil Sinus Congestion & Pain [Phenylephrine-Ibuprofen] Hives    Ibuprofen Hives    Meloxicam     Benadryl [Diphenhydramine] Other (See Comments)     palpatations    Nystatin Hives    Wound Dressings Rash         Current Outpatient Medications:     acetaminophen (TYLENOL) 500 mg tablet, Take 500 mg by mouth every 6 (six) hours as needed for mild pain, Disp: , Rfl:     albuterol (PROVENTIL HFA,VENTOLIN HFA) 90 mcg/act inhaler, Inhale 2 puffs every 6 (six) hours as needed for wheezing, Disp: , Rfl:     cetirizine (ZyrTEC) 10 mg tablet, Take 10 mg by mouth daily  , Disp: , Rfl:     cyanocobalamin (VITAMIN B-12) 1,000 mcg tablet, Take 1,000 mcg by mouth daily  , Disp: , Rfl:     cycloSPORINE (RESTASIS) 0 05 % ophthalmic emulsion, Daily, Disp: , Rfl:     ferrous sulfate 325 (65 Fe) mg tablet, Take 1 tablet by mouth daily with breakfast, Disp: , Rfl: 5    fluticasone (FLONASE) 50 mcg/act nasal spray, 1 spray into each nostril daily  , Disp: , Rfl:     hydrOXYzine HCL (ATARAX) 10 mg tablet, Take 25 mg by mouth every 6 (six) hours as needed for itching, Disp: , Rfl:     pantoprazole (PROTONIX) 40 mg tablet, Take 40 mg by mouth every morning  , Disp: , Rfl:     SRONYX 0 1-20 MG-MCG per tablet, TAKE 1 TABLET DAILY AS DIRECTED, Disp: 28 tablet, Rfl: 3    ALAWAY 0 025 % ophthalmic solution, , Disp: , Rfl:     CARAFATE 1 GM/10ML suspension, TAKE 10 MILLILITER THREE TIMES DAILY, Disp: , Rfl: 3    Dextromethorphan Polistirex ER (DELSYM) 30 MG/5ML SUER, Take 5 mL (30 mg total) by mouth every 8 (eight) hours as needed (cough) (Patient not taking: Reported on 7/9/2019), Disp: 148 mL, Rfl: 0    gabapentin (NEURONTIN) 100 mg capsule, TAKE ONE CAPSULE IN THE MORNING AND TAKE 2 CAPSULES AT BEDTIME, Disp: , Rfl: 0    ibuprofen (MOTRIN) 400 mg tablet, Take by mouth every 6 (six) hours as needed for mild pain, Disp: , Rfl:       Review of Systems  Constitutional :no fever, feels well, no tiredness, no recent weight gain or loss  ENT: no ear ache, no loss of hearing, no nosebleeds or nasal discharge, no sore throat or hoarseness  Cardiovascular: no complaints of slow or fast heart beat, no chest pain, no palpitations, no leg claudication or lower extremity edema  Respiratory: no complaints of shortness of shortness of breath, no AVENDANO  Breasts:no complaints of breast pain, breast lump, or nipple discharge  Gastrointestinal: no complaints of abdominal pain, constipation, nausea, vomiting, or diarrhea or bloody stools  Genitourinary : no complaints of dysuria, incontinence, pelvic pain, no dysmenorrhea, vaginal discharge or abnormal vaginal bleeding and as noted in HPI  Musculoskeletal: no complaints of arthralgia, no myalgia, no joint swelling or stiffness, no limb pain or swelling  Integumentary: no complaints of skin rash or lesion, itching or dry skin  Neurological: no complaints of headache, no confusion, no numbness or tingling, no dizziness or fainting    Objective      /62   Wt 114 kg (251 lb 3 2 oz)   LMP 07/04/2019 (Exact Date)   BMI 50 74 kg/m²   General:   appears stated age, cooperative, alert normal mood and affect   Neck: normal, supple,trachea midline, no masses   Heart: regular rate and rhythm, S1, S2 normal, no murmur, click, rub or gallop   Lungs: clear to auscultation bilaterally   Breasts: normal appearance, no masses or tenderness, Inspection negative, No nipple retraction or dimpling, No nipple discharge or bleeding, No axillary or supraclavicular adenopathy, Normal to palpation without dominant masses   Abdomen: soft, non-tender, without masses or organomegaly   Vulva: Bartholin's, Urethra, Wilmont normal, no lesions, normal female hair distribution   Vagina: normal vagina, no discharge, exudate, lesion, or erythema   Urethra: normal   Cervix: Normal, no discharge     Uterus: normal size, contour, position, consistency, mobility, non-tender   Adnexa: normal adnexa   Lymphatic palpation of lymph nodes in neck, axilla, groin and/or other locations: no lymphadenopathy or masses noted   Skin normal skin turgor and no rashes     Psychiatric orientation to person, place, and time: normal  mood and affect: normal

## 2019-07-19 ENCOUNTER — OFFICE VISIT (OUTPATIENT)
Dept: URGENT CARE | Facility: MEDICAL CENTER | Age: 39
End: 2019-07-19
Payer: COMMERCIAL

## 2019-07-19 VITALS
HEART RATE: 94 BPM | BODY MASS INDEX: 52.01 KG/M2 | DIASTOLIC BLOOD PRESSURE: 82 MMHG | HEIGHT: 59 IN | RESPIRATION RATE: 18 BRPM | TEMPERATURE: 98.1 F | SYSTOLIC BLOOD PRESSURE: 140 MMHG | WEIGHT: 258 LBS | OXYGEN SATURATION: 98 %

## 2019-07-19 DIAGNOSIS — R60.0 LOWER EXTREMITY EDEMA: Primary | ICD-10-CM

## 2019-07-19 PROCEDURE — G0382 LEV 3 HOSP TYPE B ED VISIT: HCPCS | Performed by: PHYSICIAN ASSISTANT

## 2019-07-19 PROCEDURE — 99283 EMERGENCY DEPT VISIT LOW MDM: CPT | Performed by: PHYSICIAN ASSISTANT

## 2019-07-19 PROCEDURE — 99213 OFFICE O/P EST LOW 20 MIN: CPT | Performed by: PHYSICIAN ASSISTANT

## 2019-07-19 NOTE — PATIENT INSTRUCTIONS
Follow up with PCP in 3-5 days  Proceed to  ER if symptoms worsen  Edema   WHAT YOU NEED TO KNOW:   What is edema? Edema is swelling throughout your body  Edema is usually a sign that you are retaining fluid  The swelling may be caused by heart failure or kidney, thyroid, or liver disease  It may also be caused by medicines such as antidepressants, blood pressure medicines, or hormones  Sudden swelling around the lips or face may be a sign of a severe allergic reaction  Swelling of an arm or leg may be caused by blockage of your veins  What other signs and symptoms may occur with edema? · Discomfort or tenderness in the swollen areas    · Tight and shiny skin over the swollen areas    · Weight gain  How is edema diagnosed? Your healthcare provider will ask about your symptoms and any other symptoms you have  He may also ask about any medical conditions you have  Your healthcare provider will examine your skin over the swollen areas  He may gently push on the swollen area for a short time to see if this leaves a dimple  He may also order tests to find the cause of your edema  How is edema treated and managed? Treatment for edema depends on the cause  Depending on your medical condition, you may be given medicine to help get rid of extra body fluid  Your healthcare provider may suggest that you do any of the following to help manage edema:  · Elevate  your arms or legs as directed  Raise them above the level of your heart as often as you can  This will help decrease swelling and pain  Prop them on pillows or blankets to keep them elevated comfortably  · Wear pressure stockings as directed  The stockings are tight and put pressure on your legs  This helps to keep fluid from collecting in your legs or ankles  · Limit your salt intake  Salt causes your body to hold water  Ask about any other changes to your diet  · Stay active  Do not stand or sit for long periods of time   Ask your healthcare provider about the best exercise plan for you  · Keep your skin moist  using lotion, cream, or ointment  Ask your healthcare provider what to use and how often to use it  When should I contact my healthcare provider? · The swollen area feels cold and is pale or blue in color  · The swollen area feels warm, painful, and is red in color  · You have increased swelling or swelling in other parts of your body  · You have questions or concerns about your condition or care  When should I seek immediate care? · You have shortness of breath at rest, especially when you lie down  · You cough up pink, foamy sputum  · You have chest pain  · Your heartbeat is fast or uneven  CARE AGREEMENT:   You have the right to help plan your care  Learn about your health condition and how it may be treated  Discuss treatment options with your caregivers to decide what care you want to receive  You always have the right to refuse treatment  The above information is an  only  It is not intended as medical advice for individual conditions or treatments  Talk to your doctor, nurse or pharmacist before following any medical regimen to see if it is safe and effective for you  © 2017 [facility] Information is for End User's use only and may not be sold, redistributed or otherwise used for commercial purposes  All illustrations and images included in CareNotes® are the copyrighted property of [employer]  or Vinicius Sandoval

## 2019-07-19 NOTE — PROGRESS NOTES
[facility]        NAME: Gregory Philip is a 45 y o  female  : 1980    MRN: [id number]  DATE: 2019  TIME: 3:16 PM    Assessment and Plan   Lower extremity edema [R60 0]  1  Lower extremity edema           Patient Instructions       Follow up with PCP in 3-5 days  Proceed to  ER if symptoms worsen  Chief Complaint     Chief Complaint   Patient presents with    Leg Swelling     Patient relates started with bilateral leg swelling for 1 1/2 months now  Patient thinks swelling is related to her taking Gabapentin  She was seen by her PCP Dr Kolby Goldberg x1 month ago and told that swelling is not due to Gabapentin  History of Present Illness       59-year-old female presents with month and a half history of lower extremity swelling  Patient reports that she notices swelling started after taking gabapentin  She reports that she does not was stop taking the gabapentin because it is helping out with her nerve pain issues that she is having her arm  Denies any chest pain or shortness of breath  Denies any lower leg pain  Denies any cough  No fevers or chills  No abdominal pain nausea vomiting diarrhea  Other   This is a new problem  The current episode started more than 1 month ago  The problem occurs constantly  The problem has been waxing and waning  Pertinent negatives include no abdominal pain, chest pain, chills, congestion, coughing, diaphoresis, fatigue, fever, myalgias, nausea, numbness, rash, sore throat, vomiting or weakness  Nothing aggravates the symptoms  She has tried nothing for the symptoms  The treatment provided no relief  Review of Systems   Review of Systems   Constitutional: Negative  Negative for chills, diaphoresis, fatigue and fever  HENT: Negative  Negative for congestion and sore throat  Eyes: Negative  Respiratory: Negative  Negative for cough  Cardiovascular: Positive for leg swelling  Negative for chest pain  Gastrointestinal: Negative  Negative for abdominal pain, nausea and vomiting  Musculoskeletal: Negative  Negative for myalgias  Skin: Negative  Negative for rash  Neurological: Negative  Negative for weakness and numbness  Current Medications       Current Outpatient Medications:     acetaminophen (TYLENOL) 500 mg tablet, Take 500 mg by mouth every 6 (six) hours as needed for mild pain, Disp: , Rfl:     ALAWAY 0 025 % ophthalmic solution, , Disp: , Rfl:     albuterol (PROVENTIL HFA,VENTOLIN HFA) 90 mcg/act inhaler, Inhale 2 puffs every 6 (six) hours as needed for wheezing, Disp: , Rfl:     cetirizine (ZyrTEC) 10 mg tablet, Take 10 mg by mouth daily  , Disp: , Rfl:     cyanocobalamin (VITAMIN B-12) 1,000 mcg tablet, Take 1,000 mcg by mouth daily  , Disp: , Rfl:     cycloSPORINE (RESTASIS) 0 05 % ophthalmic emulsion, Daily, Disp: , Rfl:     ferrous sulfate 325 (65 Fe) mg tablet, Take 1 tablet by mouth daily with breakfast, Disp: , Rfl: 5    fluticasone (FLONASE) 50 mcg/act nasal spray, 1 spray into each nostril daily  , Disp: , Rfl:     gabapentin (NEURONTIN) 100 mg capsule, TAKE ONE CAPSULE IN THE MORNING AND TAKE 2 CAPSULES AT BEDTIME, Disp: , Rfl: 0    hydrOXYzine HCL (ATARAX) 10 mg tablet, Take 25 mg by mouth every 6 (six) hours as needed for itching, Disp: , Rfl:     levonorgestrel-ethinyl estradiol (SRONYX) 0 1-20 MG-MCG per tablet, Take 1 tablet by mouth daily, Disp: 28 tablet, Rfl: 11    pantoprazole (PROTONIX) 40 mg tablet, Take 40 mg by mouth every morning  , Disp: , Rfl:     CARAFATE 1 GM/10ML suspension, TAKE 10 MILLILITER THREE TIMES DAILY, Disp: , Rfl: 3    Dextromethorphan Polistirex ER (DELSYM) 30 MG/5ML SUER, Take 5 mL (30 mg total) by mouth every 8 (eight) hours as needed (cough) (Patient not taking: Reported on 7/9/2019), Disp: 148 mL, Rfl: 0    ibuprofen (MOTRIN) 400 mg tablet, Take by mouth every 6 (six) hours as needed for mild pain, Disp: , Rfl:     Current Allergies     Allergies as of 2019 - Reviewed 2019   Allergen Reaction Noted    Advil sinus congestion & pain [phenylephrine-ibuprofen] Hives 10/18/2017    Ibuprofen Hives 2018    Meloxicam  2019    Benadryl [diphenhydramine] Other (See Comments) 2016    Nystatin Hives 2016    Wound dressings Rash 2017            The following portions of the patient's history were reviewed and updated as appropriate: allergies, current medications, past family history, past medical history, past social history, past surgical history and problem list      Past Medical History:   Diagnosis Date    Abdominal hernia     Anemia     Anxiety     Asthma     Dermatitis     On neck    GERD (gastroesophageal reflux disease)     Hypertension     History of- no longer on medication    Obesity     Reflux esophagitis     Seasonal allergies     Wears partial dentures     Top       Past Surgical History:   Procedure Laterality Date     SECTION      x3    CHOLECYSTECTOMY      Laparoscopic    COLONOSCOPY N/A 3/11/2016    Procedure: COLONOSCOPY;  Surgeon: Kayleen Menchaca MD;  Location: [facility]; Service:     HERNIA REPAIR      SD LAP,DIAGNOSTIC ABDOMEN N/A 10/25/2017    Procedure: LAPAROSCOPY DIAGNOSTIC, HERNIA REPAIR WITH MESH  WITH LYSIS ADHESIONS;  Surgeon: Kayleen Menchaca MD;  Location: [facility];  Service: General       Family History   Problem Relation Age of Onset    Skin cancer Mother     Kidney cancer Father     Skin cancer Father     Diabetes Father          Medications have been verified  Objective   /82   Pulse 94   Temp 98 1 °F (36 7 °C) (Tympanic)   Resp 18   Ht 4' 11" (1 499 m)   Wt 117 kg (258 lb)   LMP 2019 (Exact Date)   SpO2 98%   BMI 52 11 kg/m²        Physical Exam     Physical Exam   Constitutional: She is oriented to person, place, and time  She appears well-developed and well-nourished  No distress     HENT:   Head: Normocephalic and atraumatic  Right Ear: External ear normal    Left Ear: External ear normal    Nose: Nose normal    Mouth/Throat: Oropharynx is clear and moist  No oropharyngeal exudate  Eyes: Conjunctivae and EOM are normal  Right eye exhibits no discharge  Left eye exhibits no discharge  Neck: Normal range of motion  Neck supple  Cardiovascular: Normal rate, regular rhythm, normal heart sounds and intact distal pulses  No murmur heard  Pulmonary/Chest: Effort normal and breath sounds normal  No respiratory distress  She has no wheezes  She has no rales  Abdominal: Soft  Bowel sounds are normal  There is no tenderness  Musculoskeletal: Normal range of motion  Right lower leg: She exhibits swelling and edema (2+ edema noted to mid leg)  She exhibits no tenderness, no bony tenderness, no deformity and no laceration  Left lower leg: She exhibits swelling and edema (2+ edema noted to mid leg)  She exhibits no tenderness, no bony tenderness, no deformity and no laceration  Lymphadenopathy:     She has no cervical adenopathy  Neurological: She is alert and oriented to person, place, and time  Skin: Skin is warm and dry  Psychiatric: She has a normal mood and affect  Nursing note and vitals reviewed

## 2020-02-12 NOTE — ED PROVIDER NOTES
History  Chief Complaint   Patient presents with    Allergic Reaction     Reports allergic reaction to something used in hernia repairon Wednesday the 25th of October  Rash noted to abdomen  Reports taking steroids but rash continues to worsen  Symptoms since Monday  29-year-old female presents for allergic reaction for 4 days  Patient states that she recently had laparoscopic hernia repair with mesh placement  Since this past Monday she has had a itchy red rash to the lower part of her abdomen  No home remedies made it better or worse  He she attempted to contact her surgeon and the covering provider advised her to take Benadryl which she states she is allergic to and has reaction palpitations  She then saw her primary care provider today and was given a steroid injection without improvement  The patient denies any associated chest pain, pressure, shortness of breath, trouble swallowing, wheezing or pharyngeal swelling  The patient states that she has had minimal improvement with a hydrocortisone ointment  The patient states that previous surgery resulted in a similar type of reaction  History provided by:  Patient  Allergic Reaction   Presenting symptoms: itching and rash    Presenting symptoms: no difficulty swallowing and no wheezing    Severity:  Moderate  Duration:  4 days  Relieved by:  Nothing  Exacerbated by: Wearing abdominal binder  Ineffective treatments:  OTC ointments and steroids      Prior to Admission Medications   Prescriptions Last Dose Informant Patient Reported? Taking?    HYDROcodone-acetaminophen (NORCO) 5-325 mg per tablet   No Yes   Sig: Take 1 tablet by mouth every 4 (four) hours as needed for pain for up to 30 doses Max Daily Amount: 6 tablets   Levonorgestrel-Ethinyl Estrad (ORSYTHIA PO)   Yes Yes   Sig: Take 1 tablet by mouth daily at bedtime     Triamcinolone Acetonide (KENALOG EX)   Yes Yes   Sig: Apply topically 2 (two) times a day For neck - dermatitis acetaminophen (TYLENOL) 500 mg tablet   Yes Yes   Sig: Take 500 mg by mouth every 6 (six) hours as needed for mild pain   albuterol (PROVENTIL HFA,VENTOLIN HFA) 90 mcg/act inhaler   Yes Yes   Sig: Inhale 2 puffs every 6 (six) hours as needed for wheezing   cetirizine (ZyrTEC) 10 mg tablet   Yes Yes   Sig: Take 10 mg by mouth daily  clindamycin (CLEOCIN T) 1 % lotion   Yes Yes   Sig: Apply topically 2 (two) times a day  cyanocobalamin (VITAMIN B-12) 1,000 mcg tablet   Yes Yes   Sig: Take 1,000 mcg by mouth daily  fluticasone (FLONASE) 50 mcg/act nasal spray   Yes Yes   Si spray into each nostril daily  hydrOXYzine HCL (ATARAX) 10 mg tablet   Yes Yes   Sig: Take 25 mg by mouth every 6 (six) hours as needed for itching   ibuprofen (MOTRIN) 400 mg tablet   Yes Yes   Sig: Take by mouth every 6 (six) hours as needed for mild pain   pantoprazole (PROTONIX) 40 mg tablet   Yes Yes   Sig: Take 40 mg by mouth every morning        Facility-Administered Medications: None       Past Medical History:   Diagnosis Date    Abdominal hernia     Anemia     Anxiety     Asthma     Dermatitis     On neck    GERD (gastroesophageal reflux disease)     Hypertension     History of- no longer on medication    Obesity     Reflux esophagitis     Seasonal allergies     Wears partial dentures     Top       Past Surgical History:   Procedure Laterality Date     SECTION      x3    CHOLECYSTECTOMY      Laparoscopic    COLONOSCOPY N/A 3/11/2016    Procedure: COLONOSCOPY;  Surgeon: Michele Jj MD;  Location: [facility]; Service:     AK LAP,DIAGNOSTIC ABDOMEN N/A 10/25/2017    Procedure: LAPAROSCOPY DIAGNOSTIC, HERNIA REPAIR WITH MESH  WITH LYSIS ADHESIONS;  Surgeon: Michele Jj MD;  Location[facility];  Service: General       History reviewed  No pertinent family history  I have reviewed and agree with the history as documented      Social History   Substance Use Topics    Smoking status: Never Smoker  Smokeless tobacco: Never Used    Alcohol use No        Review of Systems   Constitutional: Negative for chills and fever  HENT: Negative for facial swelling and trouble swallowing  Respiratory: Negative for wheezing  Gastrointestinal: Positive for abdominal pain  Skin: Positive for itching and rash  All other systems reviewed and are negative  Physical Exam  ED Triage Vitals [11/03/17 1816]   Temperature Pulse Respirations Blood Pressure SpO2   98 °F (36 7 °C) 86 18 131/62 100 %      Temp Source Heart Rate Source Patient Position - Orthostatic VS BP Location FiO2 (%)   Oral Monitor Sitting Right arm --      Pain Score       4           Orthostatic Vital Signs  Vitals:    11/03/17 1816   BP: 131/62   Pulse: 86   Patient Position - Orthostatic VS: Sitting       Physical Exam   Constitutional: She is oriented to person, place, and time  She appears well-developed and well-nourished  No distress  HENT:   Head: Normocephalic and atraumatic  Right Ear: External ear normal    Left Ear: External ear normal    Eyes: Conjunctivae and EOM are normal  Pupils are equal, round, and reactive to light  No scleral icterus  Neck: Normal range of motion  Cardiovascular: Normal rate, regular rhythm and normal heart sounds  Pulmonary/Chest: Effort normal and breath sounds normal  No respiratory distress  Abdominal: Soft  Bowel sounds are normal  There is no tenderness  There is no rebound and no guarding  Musculoskeletal: Normal range of motion  She exhibits no edema  Neurological: She is alert and oriented to person, place, and time  Skin: Skin is warm and dry  No rash noted  Psychiatric: She has a normal mood and affect  Nursing note and vitals reviewed        ED Medications  Medications   hydrOXYzine HCL (ATARAX) tablet 25 mg (25 mg Oral Given 11/3/17 4658)       Diagnostic Studies  Results Reviewed     None                 No orders to display              Procedures  Procedures Phone Contacts  ED Phone Contact    ED Course  ED Course                                MDM  Number of Diagnoses or Management Options  Acute contact dermatitis: new and requires workup  Diagnosis management comments: There are no signs of acute anaphylaxis or systemic allergic reaction  The patient has a well demarcated rectangular rash on the lower abdomen that is consistent with either the abdominal binder or potentially the adhesive from a surgical field drape  The patient has not had improvement from the steroid injection administered by the primary care physician today  I discussed the from the cocaine medics and the peak onset of medication  I also discussed the use of Atarax as the patient has palpitations the Benadryl and has tolerated Atarax in the past   The plan is to discharge the patient on Atarax with outpatient follow-up and continued use of the hydrocortisone topical ointment  The patient (and any family present) verbalized understanding of the discharge instructions and warnings that would necessitate return to the Emergency Department  All questions were answered prior to discharge  Amount and/or Complexity of Data Reviewed  Review and summarize past medical records: yes (Laparoscopic hernia repair with mesh implantation by Dr Rick Hughes  Unremarkable operative course)      CritCare Time    Disposition  Final diagnoses:   Acute contact dermatitis     Time reflects when diagnosis was documented in both MDM as applicable and the Disposition within this note     Time User Action Codes Description Comment    11/3/2017  6:46 PM Vishal Suelise Add [L25 9] Acute contact dermatitis       ED Disposition     ED Disposition Condition Comment    Discharge  Marcelino Bowers discharge to home/self care      Condition at discharge: Stable        Follow-up Information     Follow up With Specialties Details Why Brii Healy MD General Surgery Schedule an appointment as soon as possible for a visit For further evaluation, if not improved  [place] [address]  [phone]          Patient's Medications   Discharge Prescriptions    HYDROXYZINE HCL (ATARAX) 25 MG TABLET    Take 1 tablet by mouth every 6 (six) hours as needed for itching       Start Date: 11/3/2017 End Date: --       Order Dose: 25 mg       Quantity: 30 tablet    Refills: 0     No discharge procedures on file      ED Provider  Electronically Signed by           Nicolasa Mascorro DO  11/03/17 8798 Detail Level: Detailed

## 2020-02-14 ENCOUNTER — TELEPHONE (OUTPATIENT)
Dept: OBGYN CLINIC | Facility: MEDICAL CENTER | Age: 40
End: 2020-02-14

## 2020-02-14 NOTE — TELEPHONE ENCOUNTER
Recently started on adderall  Now with mild brown vaginal discharge  Denies pain or passing clots    Advised to monitor symptoms, call back if persists and/or bleeding becomes heavy

## 2020-02-14 NOTE — TELEPHONE ENCOUNTER
----- Message from Nichole Patel sent at 2/14/2020  7:32 AM EST -----  Regarding: Non-Urgent Medical Question  Contact: [phone]  I just started w new medication last week and got my period yesterday it's been brown since it began  Initially I thought it was old blood renea typically I've heard that can happen and googled it  However it's stayed that way  The only other time this has ever happened was during my miscarriage  I'm def not pregnant, haven't had cramps or discharge before it came is this something to be concerned about? I ask only because my labs were all out of wack recently as well

## 2020-03-04 ENCOUNTER — DOCTOR'S OFFICE (OUTPATIENT)
Dept: URBAN - METROPOLITAN AREA CLINIC 136 | Facility: CLINIC | Age: 40
Setting detail: OPHTHALMOLOGY
End: 2020-03-04
Payer: COMMERCIAL

## 2020-03-04 ENCOUNTER — RX ONLY (RX ONLY)
Age: 40
End: 2020-03-04

## 2020-03-04 DIAGNOSIS — H52.03: ICD-10-CM

## 2020-03-04 DIAGNOSIS — H52.223: ICD-10-CM

## 2020-03-04 PROBLEM — H17.822 CORNEAL SCAR PERIPHERAL; LEFT EYE: Status: ACTIVE | Noted: 2018-02-14

## 2020-03-04 PROCEDURE — 92014 COMPRE OPH EXAM EST PT 1/>: CPT | Performed by: OPTOMETRIST

## 2020-03-04 PROCEDURE — 92015 DETERMINE REFRACTIVE STATE: CPT | Performed by: OPTOMETRIST

## 2020-03-04 ASSESSMENT — REFRACTION_CURRENTRX
OS_SPHERE: +1.75
OD_SPHERE: +1.75
OS_OVR_VA: 20/
OD_AXIS: 105
OD_OVR_VA: 20/
OD_VPRISM: BU
OS_VPRISM_DIRECTION: SV
OD_CYLINDER: -1.50
OD_VPRISM_DIRECTION: SV
OS_CYLINDER: -1.25
OS_AXIS: 085

## 2020-03-04 ASSESSMENT — SPHEQUIV_DERIVED
OD_SPHEQUIV: 1.5
OS_SPHEQUIV: 1.375
OD_SPHEQUIV: 1
OS_SPHEQUIV: 1.125

## 2020-03-04 ASSESSMENT — REFRACTION_AUTOREFRACTION
OD_AXIS: 121
OS_SPHERE: +2.00
OD_SPHERE: +2.25
OD_CYLINDER: -1.50
OS_AXIS: 084
OS_CYLINDER: -1.25

## 2020-03-04 ASSESSMENT — REFRACTION_MANIFEST
OD_VA2: 20/20
OD_SPHERE: +1.75
OS_VA2: 20/20
OU_VA: 20/20
OS_SPHERE: +1.75
OS_CYLINDER: -1.25
OD_AXIS: 105
OS_VPRISM: BU
OS_VA1: 20/20
OS_AXIS: 085
OD_VA1: 20/20
OD_CYLINDER: -1.50

## 2020-03-04 ASSESSMENT — CONFRONTATIONAL VISUAL FIELD TEST (CVF)
OS_FINDINGS: FULL
OD_FINDINGS: FULL

## 2020-03-04 ASSESSMENT — AXIALLENGTH_DERIVED
OS_AL: 22.3452
OS_AL: 22.4329
OD_AL: 22.4771
OD_AL: 22.3016

## 2020-03-04 ASSESSMENT — TEAR BREAK UP TIME (TBUT)
OS_TBUT: 5S
OD_TBUT: 5S

## 2020-03-04 ASSESSMENT — KERATOMETRY
OD_K1POWER_DIOPTERS: 45.50
OD_AXISANGLE_DEGREES: 119
OS_K2POWER_DIOPTERS: 46.00
OD_K2POWER_DIOPTERS: 45.75
OS_K1POWER_DIOPTERS: 45.25
OS_AXISANGLE_DEGREES: 078

## 2020-03-04 ASSESSMENT — SUPERFICIAL PUNCTATE KERATITIS (SPK)
OD_SPK: T 1+
OS_SPK: T 1+

## 2020-03-04 ASSESSMENT — VISUAL ACUITY
OS_BCVA: 20/20-2
OD_BCVA: 20/20

## 2020-03-04 ASSESSMENT — DECREASING TEAR LAKE - SEVERITY SCORE
OD_DEC_TEARLAKE: 1+
OS_DEC_TEARLAKE: 1+

## 2020-07-06 DIAGNOSIS — Z30.41 ENCOUNTER FOR SURVEILLANCE OF CONTRACEPTIVE PILLS: ICD-10-CM

## 2020-07-06 RX ORDER — LEVONORGESTREL AND ETHINYL ESTRADIOL 0.1-0.02MG
KIT ORAL
Qty: 28 TABLET | Refills: 11 | Status: SHIPPED | OUTPATIENT
Start: 2020-07-06 | End: 2020-09-28 | Stop reason: SDUPTHER

## 2020-09-28 ENCOUNTER — ANNUAL EXAM (OUTPATIENT)
Dept: OBGYN CLINIC | Facility: MEDICAL CENTER | Age: 40
End: 2020-09-28
Payer: COMMERCIAL

## 2020-09-28 VITALS
DIASTOLIC BLOOD PRESSURE: 70 MMHG | WEIGHT: 248.8 LBS | SYSTOLIC BLOOD PRESSURE: 128 MMHG | TEMPERATURE: 97.9 F | BODY MASS INDEX: 50.25 KG/M2

## 2020-09-28 DIAGNOSIS — Z30.41 ENCOUNTER FOR SURVEILLANCE OF CONTRACEPTIVE PILLS: ICD-10-CM

## 2020-09-28 DIAGNOSIS — Z12.31 ENCOUNTER FOR SCREENING MAMMOGRAM FOR MALIGNANT NEOPLASM OF BREAST: ICD-10-CM

## 2020-09-28 DIAGNOSIS — Z01.419 ENCOUNTER FOR GYNECOLOGICAL EXAMINATION WITH PAPANICOLAOU SMEAR OF CERVIX: Primary | ICD-10-CM

## 2020-09-28 PROCEDURE — G0145 SCR C/V CYTO,THINLAYER,RESCR: HCPCS | Performed by: OBSTETRICS & GYNECOLOGY

## 2020-09-28 PROCEDURE — 87624 HPV HI-RISK TYP POOLED RSLT: CPT | Performed by: OBSTETRICS & GYNECOLOGY

## 2020-09-28 PROCEDURE — 99396 PREV VISIT EST AGE 40-64: CPT | Performed by: OBSTETRICS & GYNECOLOGY

## 2020-09-28 RX ORDER — LEVONORGESTREL AND ETHINYL ESTRADIOL 0.1-0.02MG
1 KIT ORAL DAILY
Qty: 28 TABLET | Refills: 11 | Status: SHIPPED | OUTPATIENT
Start: 2020-09-28 | End: 2021-06-25 | Stop reason: SDUPTHER

## 2020-09-28 RX ORDER — LEVONORGESTREL AND ETHINYL ESTRADIOL 0.1-0.02MG
1 KIT ORAL DAILY
Qty: 28 TABLET | Refills: 11 | Status: CANCELLED | OUTPATIENT
Start: 2020-09-28

## 2020-09-28 NOTE — PROGRESS NOTES
[facility] [address] [place] [address]    ASSESSMENT/PLAN: Luisana Esquivel is a 36 y o  [id number] who presents for annual gynecologic exam   1  Routine well woman exam completed today  2  Cervical Cancer Screening: Current ASCCP Guidelines reviewed  Last Pap: 2017 Next Pap Due: today  3  Contraceptive counseling discussed  Current contraception: combination OCPs   4  Breast cancer screening: mammo ordered     CC:  Annual Gynecologic Examination    HPI: Luisana Esquivel is a 36 y o  [id number] who presents for annual gynecologic examination  Gynecologic Exam   The patient's pertinent negatives include no genital itching, genital odor or vaginal bleeding  Primary symptoms comment: reports dark spotting prior to cycle  She is not pregnant  She is sexually active  No, her partner does not have an STD  She uses oral contraceptives for contraception  Her menstrual history has been regular  Recently diagnosed with ADHD  Working with PCP to control diabetes better  Discussed weight loss, considering restarting Weight Watchers  Did well with it in the past      The following portions of the patient's history were reviewed and updated as appropriate: allergies, current medications, past family history, past medical history, obstetric history, gynecologic history, past social history, past surgical history and problem list     Review of Systems   Constitutional: Negative  HENT: Negative  Eyes: Negative  Respiratory: Negative  Cardiovascular: Negative  Gastrointestinal: Negative  Genitourinary: Negative  Musculoskeletal: Negative  All other systems reviewed and are negative  Objective:  /70   Temp 97 9 °F (36 6 °C)   Wt 113 kg (248 lb 12 8 oz)   LMP 09/23/2020 (Exact Date)   BMI 50 25 kg/m²    Physical Exam  Vitals signs reviewed  Constitutional:       General: She is not in acute distress  Appearance: She is well-developed   She is obese    HENT:      Head: Normocephalic and atraumatic  Nose: Nose normal    Neck:      Musculoskeletal: Normal range of motion  Cardiovascular:      Rate and Rhythm: Normal rate  Pulmonary:      Effort: Pulmonary effort is normal  No respiratory distress  Chest:      Breasts: Breasts are symmetrical          Right: Normal  No mass, nipple discharge, skin change or tenderness  Left: Normal  No mass, nipple discharge, skin change or tenderness  Abdominal:      General: There is no distension  Palpations: Abdomen is soft  There is no mass  Tenderness: There is no abdominal tenderness  There is no guarding or rebound  Genitourinary:     General: Normal vulva  Exam position: Lithotomy position  Labia:         Right: No lesion  Left: No lesion  Urethra: No prolapse (urethral meatus normal)  Vagina: Normal  No vaginal discharge, erythema or bleeding  Cervix: Normal       Uterus: Normal        Adnexa: Right adnexa normal and left adnexa normal       Comments: Pap done    Musculoskeletal: Normal range of motion  Lymphadenopathy:      Upper Body:      Right upper body: No supraclavicular, axillary or pectoral adenopathy  Left upper body: No supraclavicular, axillary or pectoral adenopathy  Lower Body: No left inguinal adenopathy  Skin:     General: Skin is warm and dry  Neurological:      Mental Status: She is alert and oriented to person, place, and time  Psychiatric:         Behavior: Behavior normal          Thought Content:  Thought content normal          Judgment: Judgment normal

## 2020-10-05 LAB
HPV HR 12 DNA CVX QL NAA+PROBE: NEGATIVE
HPV16 DNA CVX QL NAA+PROBE: NEGATIVE
HPV18 DNA CVX QL NAA+PROBE: NEGATIVE

## 2020-10-06 LAB
LAB AP GYN PRIMARY INTERPRETATION: NORMAL
Lab: NORMAL

## 2020-10-07 ENCOUNTER — HOSPITAL ENCOUNTER (OUTPATIENT)
Dept: MAMMOGRAPHY | Facility: MEDICAL CENTER | Age: 40
Discharge: HOME | End: 2020-10-07
Payer: COMMERCIAL

## 2020-10-07 VITALS — BODY MASS INDEX: 50 KG/M2 | HEIGHT: 59 IN | WEIGHT: 248 LBS

## 2020-10-07 DIAGNOSIS — Z12.31 ENCOUNTER FOR SCREENING MAMMOGRAM FOR MALIGNANT NEOPLASM OF BREAST: ICD-10-CM

## 2020-10-07 PROCEDURE — 77063 BREAST TOMOSYNTHESIS BI: CPT

## 2020-10-07 PROCEDURE — 77067 SCR MAMMO BI INCL CAD: CPT

## 2020-10-15 ENCOUNTER — HOSPITAL ENCOUNTER (OUTPATIENT)
Dept: ULTRASOUND IMAGING | Facility: CLINIC | Age: 40
Discharge: HOME | End: 2020-10-15
Payer: COMMERCIAL

## 2020-10-15 ENCOUNTER — HOSPITAL ENCOUNTER (OUTPATIENT)
Dept: MAMMOGRAPHY | Facility: CLINIC | Age: 40
Discharge: HOME | End: 2020-10-15
Payer: COMMERCIAL

## 2020-10-15 ENCOUNTER — TRANSCRIBE ORDERS (OUTPATIENT)
Dept: MAMMOGRAPHY | Facility: CLINIC | Age: 40
End: 2020-10-15

## 2020-10-15 DIAGNOSIS — R92.8 ABNORMAL SCREENING MAMMOGRAM: ICD-10-CM

## 2020-10-15 DIAGNOSIS — R92.8 ABNORMAL FINDINGS ON DIAGNOSTIC IMAGING OF BREAST: Primary | ICD-10-CM

## 2020-10-15 PROCEDURE — G0279 TOMOSYNTHESIS, MAMMO: HCPCS

## 2020-10-15 PROCEDURE — 77065 DX MAMMO INCL CAD UNI: CPT

## 2020-10-15 PROCEDURE — 76642 ULTRASOUND BREAST LIMITED: CPT

## 2021-01-08 ENCOUNTER — OFFICE VISIT (OUTPATIENT)
Dept: OBGYN CLINIC | Facility: MEDICAL CENTER | Age: 41
End: 2021-01-08
Payer: COMMERCIAL

## 2021-01-08 VITALS
BODY MASS INDEX: 50.1 KG/M2 | HEIGHT: 59 IN | DIASTOLIC BLOOD PRESSURE: 80 MMHG | SYSTOLIC BLOOD PRESSURE: 132 MMHG | WEIGHT: 248.5 LBS

## 2021-01-08 DIAGNOSIS — R35.0 FREQUENCY OF URINATION: ICD-10-CM

## 2021-01-08 DIAGNOSIS — N89.8 VAGINAL PRURITUS: Primary | ICD-10-CM

## 2021-01-08 LAB
SL AMB  POCT GLUCOSE, UA: ABNORMAL
SL AMB LEUKOCYTE ESTERASE,UA: ABNORMAL
SL AMB POCT BILIRUBIN,UA: ABNORMAL
SL AMB POCT BLOOD,UA: ABNORMAL
SL AMB POCT CLARITY,UA: CLEAR
SL AMB POCT COLOR,UA: YELLOW
SL AMB POCT KETONES,UA: ABNORMAL
SL AMB POCT NITRITE,UA: ABNORMAL
SL AMB POCT PH,UA: 5
SL AMB POCT SPECIFIC GRAVITY,UA: 1030
SL AMB POCT URINE PROTEIN: ABNORMAL
SL AMB POCT UROBILINOGEN: ABNORMAL

## 2021-01-08 PROCEDURE — 87086 URINE CULTURE/COLONY COUNT: CPT | Performed by: STUDENT IN AN ORGANIZED HEALTH CARE EDUCATION/TRAINING PROGRAM

## 2021-01-08 PROCEDURE — 99213 OFFICE O/P EST LOW 20 MIN: CPT | Performed by: STUDENT IN AN ORGANIZED HEALTH CARE EDUCATION/TRAINING PROGRAM

## 2021-01-08 PROCEDURE — 81513 NFCT DS BV RNA VAG FLU ALG: CPT | Performed by: STUDENT IN AN ORGANIZED HEALTH CARE EDUCATION/TRAINING PROGRAM

## 2021-01-08 PROCEDURE — 81002 URINALYSIS NONAUTO W/O SCOPE: CPT | Performed by: STUDENT IN AN ORGANIZED HEALTH CARE EDUCATION/TRAINING PROGRAM

## 2021-01-08 RX ORDER — TRAMADOL HYDROCHLORIDE 50 MG/1
50 TABLET ORAL EVERY 6 HOURS PRN
COMMUNITY

## 2021-01-08 RX ORDER — METHOCARBAMOL 500 MG/1
500 TABLET, FILM COATED ORAL 4 TIMES DAILY PRN
COMMUNITY
Start: 2020-12-31

## 2021-01-08 RX ORDER — ATOMOXETINE 80 MG/1
80 CAPSULE ORAL
COMMUNITY
Start: 2020-09-15

## 2021-01-08 RX ORDER — METFORMIN HYDROCHLORIDE 500 MG/1
500 TABLET, EXTENDED RELEASE ORAL 2 TIMES DAILY WITH MEALS
COMMUNITY
Start: 2020-09-21 | End: 2022-06-01

## 2021-01-08 NOTE — ASSESSMENT & PLAN NOTE
- Clinical exam nonspecific  - Urine dip negative  - Wet mount completely normal squamous cells no fungal elements  - Sent AFFIRM and Urine culture and will follow

## 2021-01-10 LAB
BACTERIA UR CULT: ABNORMAL
BACTERIA UR CULT: ABNORMAL
CANDIDA RRNA VAG QL PROBE: NEGATIVE
G VAGINALIS RRNA GENITAL QL PROBE: NEGATIVE
T VAGINALIS RRNA GENITAL QL PROBE: NEGATIVE

## 2021-01-11 ENCOUNTER — TELEPHONE (OUTPATIENT)
Dept: OBGYN CLINIC | Facility: MEDICAL CENTER | Age: 41
End: 2021-01-11

## 2021-01-11 NOTE — TELEPHONE ENCOUNTER
[facility] patient with urine culture of lactobacillus 50K CFU and discussed that lactobacillus is not a common UTI offender and treatment generally not recommended  She is still having symptoms of vulvovaginal burning  We reviewed negative AFFIRMx3  We discussed other possible causes including some topical creams and baby powder that she uses on the vulva  I recommended a trial discontinuing these products and only use mild soap and water on the vulva until her symptoms improve  Offered repeat office evaluation if no improvement or worsening of symptoms      Marcos Biswas MD  [facility]  01/11/21 5:49 PM

## 2021-02-08 ENCOUNTER — OFFICE VISIT (OUTPATIENT)
Dept: URGENT CARE | Facility: MEDICAL CENTER | Age: 41
End: 2021-02-08
Payer: COMMERCIAL

## 2021-02-08 VITALS — TEMPERATURE: 96.2 F | OXYGEN SATURATION: 97 % | HEART RATE: 100 BPM | RESPIRATION RATE: 18 BRPM

## 2021-02-08 DIAGNOSIS — J32.9 SINUSITIS, UNSPECIFIED CHRONICITY, UNSPECIFIED LOCATION: Primary | ICD-10-CM

## 2021-02-08 DIAGNOSIS — Z20.822 ENCOUNTER FOR LABORATORY TESTING FOR COVID-19 VIRUS: ICD-10-CM

## 2021-02-08 LAB — S PYO AG THROAT QL: NEGATIVE

## 2021-02-08 PROCEDURE — 99283 EMERGENCY DEPT VISIT LOW MDM: CPT | Performed by: PHYSICIAN ASSISTANT

## 2021-02-08 PROCEDURE — U0003 INFECTIOUS AGENT DETECTION BY NUCLEIC ACID (DNA OR RNA); SEVERE ACUTE RESPIRATORY SYNDROME CORONAVIRUS 2 (SARS-COV-2) (CORONAVIRUS DISEASE [COVID-19]), AMPLIFIED PROBE TECHNIQUE, MAKING USE OF HIGH THROUGHPUT TECHNOLOGIES AS DESCRIBED BY CMS-2020-01-R: HCPCS | Performed by: PHYSICIAN ASSISTANT

## 2021-02-08 PROCEDURE — U0005 INFEC AGEN DETEC AMPLI PROBE: HCPCS | Performed by: PHYSICIAN ASSISTANT

## 2021-02-08 PROCEDURE — 87070 CULTURE OTHR SPECIMN AEROBIC: CPT | Performed by: PHYSICIAN ASSISTANT

## 2021-02-08 PROCEDURE — 99203 OFFICE O/P NEW LOW 30 MIN: CPT | Performed by: PHYSICIAN ASSISTANT

## 2021-02-08 PROCEDURE — G0382 LEV 3 HOSP TYPE B ED VISIT: HCPCS | Performed by: PHYSICIAN ASSISTANT

## 2021-02-08 PROCEDURE — 87147 CULTURE TYPE IMMUNOLOGIC: CPT | Performed by: PHYSICIAN ASSISTANT

## 2021-02-08 RX ORDER — AMOXICILLIN AND CLAVULANATE POTASSIUM 875; 125 MG/1; MG/1
1 TABLET, FILM COATED ORAL EVERY 12 HOURS SCHEDULED
Qty: 20 TABLET | Refills: 0 | Status: SHIPPED | OUTPATIENT
Start: 2021-02-08 | End: 2021-02-18

## 2021-02-08 NOTE — PROGRESS NOTES
[facility] [employer] [facility]        NAME: Latasha Matthew is a 36 y o  female  : 1980    MRN: [id number]  DATE: 2021  TIME: 8:18 AM    Pulse 100   Temp (!) 96 2 °F (35 7 °C)   Resp 18   SpO2 97%     Assessment and Plan   Sinusitis, unspecified chronicity, unspecified location [J32 9]  1  Sinusitis, unspecified chronicity, unspecified location  POCT rapid strepA    Novel Coronavirus (Covid-19),PCR [facility] - Office Collection    Throat culture    amoxicillin-clavulanate (AUGMENTIN) 875-125 mg per tablet   2  Encounter for laboratory testing for COVID-19 virus           Patient Instructions       Follow up with PCP in 3-5 days  Proceed to  ER if symptoms worsen  Chief Complaint     Chief Complaint   Patient presents with    Cold Like Symptoms     Patient c/o sore throat and congestion x 1 week          History of Present Illness       Pt with nasal congestion and sore throat for several days,  Pt with sinus problems in past       Review of Systems   Review of Systems   Constitutional: Negative  HENT: Positive for congestion  Eyes: Negative  Respiratory: Negative  Cardiovascular: Negative  Gastrointestinal: Negative  Endocrine: Negative  Genitourinary: Negative  Musculoskeletal: Negative  Skin: Negative  Allergic/Immunologic: Negative  Neurological: Negative  Hematological: Negative  Psychiatric/Behavioral: Negative  All other systems reviewed and are negative          Current Medications       Current Outpatient Medications:     acetaminophen (TYLENOL) 500 mg tablet, Take 500 mg by mouth every 6 (six) hours as needed for mild pain, Disp: , Rfl:     ALAWAY 0 025 % ophthalmic solution, , Disp: , Rfl:     albuterol (PROVENTIL HFA,VENTOLIN HFA) 90 mcg/act inhaler, Inhale 2 puffs every 6 (six) hours as needed for wheezing, Disp: , Rfl:     amoxicillin-clavulanate (AUGMENTIN) 875-125 mg per tablet, Take 1 tablet by mouth every 12 (twelve) hours for 10 days, Disp: 20 tablet, Rfl: 0    atomoxetine (STRATTERA) 80 MG capsule, Take 80 mg by mouth, Disp: , Rfl:     CARAFATE 1 GM/10ML suspension, TAKE 10 MILLILITER THREE TIMES DAILY, Disp: , Rfl: 3    cetirizine (ZyrTEC) 10 mg tablet, Take 10 mg by mouth daily  , Disp: , Rfl:     cyanocobalamin (VITAMIN B-12) 1,000 mcg tablet, Take 1,000 mcg by mouth daily  , Disp: , Rfl:     cycloSPORINE (RESTASIS) 0 05 % ophthalmic emulsion, Daily, Disp: , Rfl:     Dextromethorphan Polistirex ER (DELSYM) 30 MG/5ML SUER, Take 5 mL (30 mg total) by mouth every 8 (eight) hours as needed (cough) (Patient not taking: Reported on 7/9/2019), Disp: 148 mL, Rfl: 0    ferrous sulfate 325 (65 Fe) mg tablet, Take 1 tablet by mouth daily with breakfast, Disp: , Rfl: 5    fluticasone (FLONASE) 50 mcg/act nasal spray, 1 spray into each nostril daily  , Disp: , Rfl:     gabapentin (NEURONTIN) 100 mg capsule, TAKE ONE CAPSULE IN THE MORNING AND TAKE 2 CAPSULES AT BEDTIME, Disp: , Rfl: 0    hydrOXYzine HCL (ATARAX) 10 mg tablet, Take 25 mg by mouth every 6 (six) hours as needed for itching, Disp: , Rfl:     ibuprofen (MOTRIN) 400 mg tablet, Take by mouth every 6 (six) hours as needed for mild pain, Disp: , Rfl:     levonorgestrel-ethinyl estradiol (Sronyx) 0 1-20 MG-MCG per tablet, Take 1 tablet by mouth daily, Disp: 28 tablet, Rfl: 11    metFORMIN (GLUCOPHAGE-XR) 500 mg 24 hr tablet, Take 500 mg by mouth, Disp: , Rfl:     methocarbamol (ROBAXIN) 500 mg tablet, Take 500 mg by mouth 4 (four) times a day as needed, Disp: , Rfl:     pantoprazole (PROTONIX) 40 mg tablet, Take 40 mg by mouth every morning  , Disp: , Rfl:     traMADol (ULTRAM) 50 mg tablet, Take 50 mg by mouth every 6 (six) hours as needed, Disp: , Rfl:     Current Allergies     Allergies as of 02/08/2021 - Reviewed 02/08/2021   Allergen Reaction Noted    Advil sinus congestion & pain [phenylephrine-ibuprofen] Hives 10/18/2017    Gabapentin Swelling 07/25/2019    Ibuprofen Hives 2018    Meloxicam  2019    Benadryl [diphenhydramine] Other (See Comments) 2016    Medical tape Rash 2017    Nystatin Hives 2016    Wound dressings Rash 2017            The following portions of the patient's history were reviewed and updated as appropriate: allergies, current medications, past family history, past medical history, past social history, past surgical history and problem list      Past Medical History:   Diagnosis Date    Abdominal hernia     Anemia     Anxiety     Asthma     Dermatitis     On neck    GERD (gastroesophageal reflux disease)     Hypertension     History of- no longer on medication    Obesity     Reflux esophagitis     Seasonal allergies     Wears partial dentures     Top       Past Surgical History:   Procedure Laterality Date     SECTION      x3    CHOLECYSTECTOMY      Laparoscopic    COLONOSCOPY N/A 3/11/2016    Procedure: COLONOSCOPY;  Surgeon: Christiane Schirmer, MD;  Location: [facility]; Service:     HERNIA REPAIR      MD LAP,DIAGNOSTIC ABDOMEN N/A 10/25/2017    Procedure: LAPAROSCOPY DIAGNOSTIC, HERNIA REPAIR WITH MESH  WITH LYSIS ADHESIONS;  Surgeon: Christiane Schirmer, MD;  Location: [facility];  Service: General       Family History   Problem Relation Age of Onset    Skin cancer Mother     Kidney cancer Father     Skin cancer Father     Diabetes Father     No Known Problems Daughter     No Known Problems Daughter     No Known Problems Daughter          Medications have been verified  Objective   Pulse 100   Temp (!) 96 2 °F (35 7 °C)   Resp 18   SpO2 97%        Physical Exam     Physical Exam  Vitals signs and nursing note reviewed  Constitutional:       Appearance: Normal appearance  She is normal weight  HENT:      Head: Normocephalic and atraumatic        Right Ear: Tympanic membrane, ear canal and external ear normal       Left Ear: Tympanic membrane, ear canal and external ear normal  Nose: Congestion and rhinorrhea present  Comments: Max sinus tender to palp      Mouth/Throat:      Mouth: Mucous membranes are moist       Pharynx: Oropharynx is clear  Eyes:      Extraocular Movements: Extraocular movements intact  Conjunctiva/sclera: Conjunctivae normal       Pupils: Pupils are equal, round, and reactive to light  Neck:      Musculoskeletal: Normal range of motion and neck supple  Cardiovascular:      Rate and Rhythm: Normal rate and regular rhythm  Pulses: Normal pulses  Heart sounds: Normal heart sounds  Pulmonary:      Effort: Pulmonary effort is normal       Breath sounds: Normal breath sounds  Abdominal:      General: Abdomen is flat  Bowel sounds are normal       Palpations: Abdomen is soft  Musculoskeletal: Normal range of motion  Skin:     General: Skin is warm  Capillary Refill: Capillary refill takes less than 2 seconds  Neurological:      General: No focal deficit present  Mental Status: She is alert and oriented to person, place, and time     Psychiatric:         Mood and Affect: Mood normal

## 2021-02-08 NOTE — PATIENT INSTRUCTIONS
[address]    Your healthcare provider and/or public health staff have evaluated you and have determined that you do not need to remain in the hospital at this time  At this time you can be isolated at home where you will be monitored by staff from your local or state health department  You should carefully follow the prevention and isolation steps below until a healthcare provider or local or state health department says that you can return to your normal activities  Stay home except to get medical care    People who are mildly ill with COVID-19 are able to isolate at home during their illness  You should restrict activities outside your home, except for getting medical care  Do not go to work, school, or public areas  Avoid using public transportation, ride-sharing, or taxis  Separate yourself from other people and animals in your home    People: As much as possible, you should stay in a specific room and away from other people in your home  Also, you should use a separate bathroom, if available  Animals: You should restrict contact with pets and other animals while you are sick with COVID-19, just like you would around other people  Although there have not been reports of pets or other animals becoming sick with COVID-19, it is still recommended that people sick with COVID-19 limit contact with animals until more information is known about the virus  When possible, have another member of your household care for your animals while you are sick  If you are sick with COVID-19, avoid contact with your pet, including petting, snuggling, being kissed or licked, and sharing food  If you must care for your pet or be around animals while you are sick, wash your hands before and after you interact with pets and wear a facemask  See COVID-19 and Animals for more information      Call ahead before visiting your doctor    If you have a medical appointment, call the healthcare provider and tell them that you have or may have COVID-19  This will help the healthcare providers office take steps to keep other people from getting infected or exposed  Wear a facemask    You should wear a facemask when you are around other people (e g , sharing a room or vehicle) or pets and before you enter a healthcare providers office  If you are not able to wear a facemask (for example, because it causes trouble breathing), then people who live with you should not stay in the same room with you, or they should wear a facemask if they enter your room  Cover your coughs and sneezes    Cover your mouth and nose with a tissue when you cough or sneeze  Throw used tissues in a lined trash can  Immediately wash your hands with soap and water for at least 20 seconds or, if soap and water are not available, clean your hands with an alcohol-based hand  that contains at least 60% alcohol  Clean your hands often    Wash your hands often with soap and water for at least 20 seconds, especially after blowing your nose, coughing, or sneezing; going to the bathroom; and before eating or preparing food  If soap and water are not readily available, use an alcohol-based hand  with at least 60% alcohol, covering all surfaces of your hands and rubbing them together until they feel dry  Soap and water are the best option if hands are visibly dirty  Avoid touching your eyes, nose, and mouth with unwashed hands  Avoid sharing personal household items    You should not share dishes, drinking glasses, cups, eating utensils, towels, or bedding with other people or pets in your home  After using these items, they should be washed thoroughly with soap and water  Clean all high-touch surfaces everyday    High touch surfaces include counters, tabletops, doorknobs, bathroom fixtures, toilets, phones, keyboards, tablets, and bedside tables  Also, clean any surfaces that may have blood, stool, or body fluids on them   Use a household cleaning spray or wipe, according to the label instructions  Labels contain instructions for safe and effective use of the cleaning product including precautions you should take when applying the product, such as wearing gloves and making sure you have good ventilation during use of the product  Monitor your symptoms    Seek prompt medical attention if your illness is worsening (e g , difficulty breathing)  Before seeking care, call your healthcare provider and tell them that you have, or are being evaluated for, COVID-19  Put on a facemask before you enter the facility  These steps will help the healthcare providers office to keep other people in the office or waiting room from getting infected or exposed  Ask your healthcare provider to call the local or [facility] health department  Persons who are placed under active monitoring or facilitated self-monitoring should follow instructions provided by their local health department or occupational health professionals, as appropriate  If you have a medical emergency and need to call 911, notify the dispatch personnel that you have, or are being evaluated for COVID-19  If possible, put on a facemask before emergency medical services arrive      Discontinuing home isolation    Patients with confirmed COVID-19 should remain under home isolation precautions until the following conditions are met:   - They have had no fever for at least 24 hours (that is one full day of no fever without the use medicine that reduces fevers)  AND  - other symptoms have improved (for example, when their cough or shortness of breath have improved)  AND  - If had mild or moderate illness, at least 10 days have passed since their symptoms first appeared or if severe illness (needed oxygen) or immunosuppressed, at least 20 days have passed since symptoms first appeared  Patients with confirmed COVID-19 should also notify close contacts (including their workplace) and ask that they self-quarantine  Currently, close contact is defined as being within 6 feet for 15 minutes or more from the period 24 hours starting 48 hours before symptom onset to the time at which the patient went into isolation  Close contacts of patients diagnosed with COVID-19 should be instructed by the patient to self-quarantine for 14 days from the last time of their last contact with the patient  Source: http://AppSame/   WHAT YOU NEED TO KNOW:   Sinusitis is inflammation or infection of your sinuses  It is most often caused by a virus  Acute sinusitis may last up to 12 weeks  Chronic sinusitis lasts longer than 12 weeks  Recurrent sinusitis means you have 4 or more times in 1 year  DISCHARGE INSTRUCTIONS:   Return to the emergency department if:   · Your eye and eyelid are red, swollen, and painful  · You cannot open your eye  · You have vision changes, such as double vision  · Your eyeball bulges out or you cannot move your eye  · You are more sleepy than normal, or you notice changes in your ability to think, move, or talk  · You have a stiff neck, a fever, or a bad headache  · You have swelling of your forehead or scalp  Contact your healthcare provider if:   · Your symptoms do not improve after 3 days  · Your symptoms do not go away after 10 days  · You have nausea and are vomiting  · Your nose is bleeding  · You have questions or concerns about your condition or care  Medicines: Your symptoms may go away on their own  Your healthcare provider may recommend watchful waiting for up to 10 days before starting antibiotics  You may  need any of the following:  · Acetaminophen  decreases pain and fever  It is available without a doctor's order  Ask how much to take and how often to take it  Follow directions   Read the labels of all other medicines you are using to see if they also contain acetaminophen, or ask your doctor or pharmacist  Acetaminophen can cause liver damage if not taken correctly  Do not use more than 4 grams (4,000 milligrams) total of acetaminophen in one day  · NSAIDs , such as ibuprofen, help decrease swelling, pain, and fever  This medicine is available with or without a doctor's order  NSAIDs can cause stomach bleeding or kidney problems in certain people  If you take blood thinner medicine, always ask your healthcare provider if NSAIDs are safe for you  Always read the medicine label and follow directions  · Nasal steroid sprays  may help decrease inflammation in your nose and sinuses  · Decongestants  help reduce swelling and drain mucus in the nose and sinuses  They may help you breathe easier  · Antihistamines  help dry mucus in the nose and relieve sneezing  · Antibiotics  help treat or prevent a bacterial infection  · Take your medicine as directed  Contact your healthcare provider if you think your medicine is not helping or if you have side effects  Tell him or her if you are allergic to any medicine  Keep a list of the medicines, vitamins, and herbs you take  Include the amounts, and when and why you take them  Bring the list or the pill bottles to follow-up visits  Carry your medicine list with you in case of an emergency  Self-care:   · Rinse your sinuses  Use a sinus rinse device to rinse your nasal passages with a saline (salt water) solution or distilled water  Do not use tap water  This will help thin the mucus in your nose and rinse away pollen and dirt  It will also help reduce swelling so you can breathe normally  Ask your healthcare provider how often to do this  · Breathe in steam   Heat a bowl of water until you see steam  Lean over the bowl and make a tent over your head with a large towel  Breathe deeply for about 20 minutes  Be careful not to get too close to the steam or burn yourself  Do this 3 times a day   You can also breathe deeply when you take a hot shower  · Sleep with your head elevated  Place an extra pillow under your head before you go to sleep to help your sinuses drain  · Drink liquids as directed  Ask your healthcare provider how much liquid to drink each day and which liquids are best for you  Liquids will thin the mucus in your nose and help it drain  Avoid drinks that contain alcohol or caffeine  · Do not smoke, and avoid secondhand smoke  Nicotine and other chemicals in cigarettes and cigars can make your symptoms worse  Ask your healthcare provider for information if you currently smoke and need help to quit  E-cigarettes or smokeless tobacco still contain nicotine  Talk to your healthcare provider before you use these products  Prevent the spread of germs that cause sinusitis:  Wash your hands often with soap and water  Wash your hands after you use the bathroom, change a child's diaper, or sneeze  Wash your hands before you prepare or eat food  Follow up with your healthcare provider as directed: You may be referred to an ear, nose, and throat specialist  Write down your questions so you remember to ask them during your visits  © Copyright [address] Information is for End User's use only and may not be sold, redistributed or otherwise used for commercial purposes  All illustrations and images included in CareNotes® are the copyrighted property of A D A M , Inc  or [address] [place] [address]  The above information is an  only  It is not intended as medical advice for individual conditions or treatments  Talk to your doctor, nurse or pharmacist before following any medical regimen to see if it is safe and effective for you

## 2021-02-09 LAB — SARS-COV-2 RNA RESP QL NAA+PROBE: NEGATIVE

## 2021-02-10 ENCOUNTER — TELEPHONE (OUTPATIENT)
Dept: URGENT CARE | Facility: MEDICAL CENTER | Age: 41
End: 2021-02-10

## 2021-02-10 LAB — BACTERIA THROAT CULT: ABNORMAL

## 2021-02-10 NOTE — TELEPHONE ENCOUNTER
Called pt back  , she has some diarrhea,  Pt feeling fine,  Discussed its probably from antibiotic  Discussed covid-19 negative and +strep test results, pt has ent eval next week for chronic sinus problems, last urine 1 hour ago,   Discussed getting probiotic from drug store while taking augmentin  Pt agrees

## 2021-02-11 ENCOUNTER — TELEPHONE (OUTPATIENT)
Dept: OBGYN CLINIC | Facility: MEDICAL CENTER | Age: 41
End: 2021-02-11

## 2021-02-11 NOTE — TELEPHONE ENCOUNTER
Patient called about the AFFIRM and urine culture she had done January 8  Stated she has still had issues and she went to a different doctor and was diagnosed with strep b  Was wondering if the affirm would catch that  I did advise that the UC most likely wouldn't and was unsure if the affirm would  Please review and call back

## 2021-02-11 NOTE — TELEPHONE ENCOUNTER
Returned call to patient  Questions if her vaginal culture completed 1/8/2021 in our office would show GBS as she has now been diagnosed with strep  Upon checking chart-patient has positive throat culture, not urine or vaginal culture for strep  Advised patient of same  All results from visit in our office reviewed with patient    Encouraged to schedule appointment if having vaginal or urinary symptoms

## 2021-02-15 ENCOUNTER — TELEPHONE (OUTPATIENT)
Dept: OBGYN CLINIC | Facility: MEDICAL CENTER | Age: 41
End: 2021-02-15

## 2021-02-15 NOTE — TELEPHONE ENCOUNTER
Pt has appt for uti testing 02/16/2021 and would like to know if she can have the labs/culture done at a lab instead of coming into the office due to weather circumstances   Pt states she is also supposed to have an STD panel ordered as per Dr Slater and would like to know if that can be ordered as well, please review

## 2021-02-16 ENCOUNTER — APPOINTMENT (OUTPATIENT)
Dept: LAB | Facility: MEDICAL CENTER | Age: 41
End: 2021-02-16
Payer: COMMERCIAL

## 2021-02-16 ENCOUNTER — OFFICE VISIT (OUTPATIENT)
Dept: OBGYN CLINIC | Facility: MEDICAL CENTER | Age: 41
End: 2021-02-16
Payer: COMMERCIAL

## 2021-02-16 VITALS
WEIGHT: 248 LBS | DIASTOLIC BLOOD PRESSURE: 60 MMHG | HEIGHT: 59 IN | BODY MASS INDEX: 50 KG/M2 | SYSTOLIC BLOOD PRESSURE: 110 MMHG

## 2021-02-16 DIAGNOSIS — Z11.3 SCREENING FOR STD (SEXUALLY TRANSMITTED DISEASE): ICD-10-CM

## 2021-02-16 DIAGNOSIS — N89.8 VAGINAL ITCHING: Primary | ICD-10-CM

## 2021-02-16 DIAGNOSIS — Z11.3 SCREENING EXAMINATION FOR STD (SEXUALLY TRANSMITTED DISEASE): ICD-10-CM

## 2021-02-16 LAB — HBV SURFACE AG SER QL: NORMAL

## 2021-02-16 PROCEDURE — 36415 COLL VENOUS BLD VENIPUNCTURE: CPT

## 2021-02-16 PROCEDURE — 86696 HERPES SIMPLEX TYPE 2 TEST: CPT

## 2021-02-16 PROCEDURE — 87389 HIV-1 AG W/HIV-1&-2 AB AG IA: CPT

## 2021-02-16 PROCEDURE — 87340 HEPATITIS B SURFACE AG IA: CPT

## 2021-02-16 PROCEDURE — 87591 N.GONORRHOEAE DNA AMP PROB: CPT | Performed by: NURSE PRACTITIONER

## 2021-02-16 PROCEDURE — 99213 OFFICE O/P EST LOW 20 MIN: CPT | Performed by: NURSE PRACTITIONER

## 2021-02-16 PROCEDURE — 86695 HERPES SIMPLEX TYPE 1 TEST: CPT

## 2021-02-16 PROCEDURE — 87491 CHLMYD TRACH DNA AMP PROBE: CPT | Performed by: NURSE PRACTITIONER

## 2021-02-16 PROCEDURE — 86592 SYPHILIS TEST NON-TREP QUAL: CPT

## 2021-02-16 RX ORDER — FLUCONAZOLE 200 MG/1
TABLET ORAL
Qty: 2 TABLET | Refills: 0 | Status: SHIPPED | OUTPATIENT
Start: 2021-02-16 | End: 2021-02-20

## 2021-02-16 RX ORDER — KETOCONAZOLE 20 MG/G
CREAM TOPICAL 2 TIMES DAILY
COMMUNITY
Start: 2021-02-12 | End: 2022-03-11 | Stop reason: ALTCHOICE

## 2021-02-16 RX ORDER — OXYBUTYNIN CHLORIDE 5 MG/1
5 TABLET ORAL 2 TIMES DAILY
COMMUNITY
Start: 2021-02-04

## 2021-02-16 NOTE — PROGRESS NOTES
A/P:  36 y o  yo female with:  Diagnoses and all orders for this visit:    Vaginal itching  -     fluconazole (DIFLUCAN) 200 mg tablet; Take one tablet now, and repeat in 4 days if needed       Screening examination for STD (sexually transmitted disease)  -     Chlamydia/GC amplified DNA by PCR    Other orders  -     ketoconazole (NIZORAL) 2 % cream; Apply topically 2 (two) times a day  -     oxybutynin (DITROPAN) 5 mg tablet; Take 5 mg by mouth 2 (two) times a day        1  Wet prep was obtained  Cultures for gonorrhea and chlamydia were collected  Affirm was not obtained  2   Will contact pt with results  3  Patient was treated   4  recommened not sex until symptoms totally resolve  5  Gave orders for std testing, she will go to lab today  Maye Cuello HISTORY OF PRESENT ILLNESS:  Aneesh Zelaya is a 36 y o  yo  female who presents for vaginal discharge  She describes the discharge as mild with a lot  of itching in mons pubis  Her symptoms started 4 days ago  show no change states she is seeing a urologist for her urinary problems, and has follow up today, also seeing pcp and dermatologist for red spots on her torso  Requests std testing, declined exposure    Alleviating factors: none  Aggravating factors: none  ROS:   She denies hematuria, dysuria, constipation, diarrhea, fever, chills, nausea or emesis      Past Medical History:   Diagnosis Date    Abdominal hernia     Anemia     Anxiety     Asthma     Dermatitis     On neck    GERD (gastroesophageal reflux disease)     Hypertension     History of- no longer on medication    Obesity     Reflux esophagitis     Seasonal allergies     Wears partial dentures     Top       Past Medical History:   Diagnosis Date    Abdominal hernia     Anemia     Anxiety     Asthma     Dermatitis     On neck    GERD (gastroesophageal reflux disease)     Hypertension     History of- no longer on medication    Obesity     Reflux esophagitis  Seasonal allergies     Wears partial dentures     Top       Social History     Socioeconomic History    Marital status: /Civil Union     Spouse name: Not on file    Number of children: Not on file    Years of education: Not on file    Highest education level: Not on file   Occupational History    Not on file   Social Needs    Financial resource strain: Not on file    Food insecurity     Worry: Not on file     Inability: Not on file   [employer] needs     Medical: Not on file     Non-medical: Not on file   Tobacco Use    Smoking status: Never Smoker    Smokeless tobacco: Never Used   Substance and Sexual Activity    Alcohol use: No    Drug use: No    Sexual activity: Yes     Partners: Male     Birth control/protection: OCP   Lifestyle    Physical activity     Days per week: Not on file     Minutes per session: Not on file    Stress: Not on file   Relationships    Social connections     Talks on phone: Not on file     Gets together: Not on file     Attends Zoroastrianism service: Not on file     Active member of club or organization: Not on file     Attends meetings of clubs or organizations: Not on file     Relationship status: Not on file    Intimate partner violence     Fear of current or ex partner: Not on file     Emotionally abused: Not on file     Physically abused: Not on file     Forced sexual activity: Not on file   Other Topics Concern    Not on file   Social History Narrative    Not on file       /60   Wt 112 kg (248 lb)   LMP 02/08/2021 (Approximate)   BMI 50 09 kg/m²     GEN: The patient was alert and oriented x3, pleasant well-appearing female in no acute distress  Pelvic: Normal appearing external female genitalia, normal vaginal epithelium, normalappearing cervix  positive discharge noted        Wet Prep: positive hyphae

## 2021-02-17 LAB
HIV 1+2 AB+HIV1 P24 AG SERPL QL IA: NORMAL
HSV1 IGG SER IA-ACNC: <0.91 INDEX (ref 0–0.9)
HSV2 IGG SER IA-ACNC: <0.91 INDEX (ref 0–0.9)
RPR SER QL: NORMAL

## 2021-02-18 LAB
HSV1 IGM TITR SER IF: NORMAL TITER
HSV2 IGM TITR SER IF: NORMAL TITER

## 2021-02-20 LAB
C TRACH DNA SPEC QL NAA+PROBE: NEGATIVE
N GONORRHOEA DNA SPEC QL NAA+PROBE: NEGATIVE

## 2021-03-01 ENCOUNTER — TELEPHONE (OUTPATIENT)
Dept: OBGYN CLINIC | Facility: MEDICAL CENTER | Age: 41
End: 2021-03-01

## 2021-03-01 DIAGNOSIS — R10.2 PELVIC PAIN IN FEMALE: Primary | ICD-10-CM

## 2021-03-02 ENCOUNTER — HOSPITAL ENCOUNTER (OUTPATIENT)
Dept: ULTRASOUND IMAGING | Facility: MEDICAL CENTER | Age: 41
Discharge: HOME | End: 2021-03-02
Payer: COMMERCIAL

## 2021-03-02 DIAGNOSIS — R10.2 PELVIC PAIN IN FEMALE: ICD-10-CM

## 2021-03-02 PROCEDURE — 76856 US EXAM PELVIC COMPLETE: CPT

## 2021-03-02 PROCEDURE — 76830 TRANSVAGINAL US NON-OB: CPT

## 2021-03-08 ENCOUNTER — TELEPHONE (OUTPATIENT)
Dept: OBGYN CLINIC | Facility: MEDICAL CENTER | Age: 41
End: 2021-03-08

## 2021-03-08 NOTE — TELEPHONE ENCOUNTER
Unfortunately Iraida Haley does not have anything sooner in the [place] office  The pt's appt is the 19th of March in [place], the only 30 minute slot before then is the 16th of March

## 2021-03-08 NOTE — TELEPHONE ENCOUNTER
I left a patient the message with the dates of the sooner appts to see if she is available to come those dates  She was instructed to call back if she is able to reschedule sooner

## 2021-03-08 NOTE — TELEPHONE ENCOUNTER
----- Message from Rodrigue Kern RN sent at 3/8/2021  8:06 AM EST -----  Regarding: FW: Test Results Question  Contact: [phone]  Can you see if there is a sooner appointment and let patient know  ----- Message -----  From: Meriel Gitelman  Sent: 3/8/2021   7:48 AM EST  To: [address] [place] [address] Clinical  Subject: RE: Test Results Question                        I was told it was the 12th is there anything sooner?     ----- Message -----  From: Rodrigue Kern RN  Sent: 3/8/21, 7:47 AM  To: Meriel Gitelman  Subject: RE: Test Results Question    I'm heath Sanchez, Dr Mitchael Severs has not reviewed your ultrasound yet  She will asap  With regard to your appointment, I see one scheduled for 3/19 at 21 [phone] in our [facility] office      ----- Message -----       [address]       Sent:3/8/2021  7:41 AM EST         To:Jami Mitchael Severs, MD    Subject:RE: Test Results Question    It is about a week and my appt was told was 12th be says the 19th       ----- Message -----       From:Cynthia Adelle Krabbe, RN       Sent:3/8/2021  7:35 AM EST         To:Odalys Barbosa    Subject:RE: Test Results Question    We usually advise a week to allow enough time for provider to review results      ----- Message -----       From:Odalys Barbosa       Sent:3/6/2021  6:38 AM EST         To:Jami Mitchael Severs, MD    Subject:Test Results Question    It's been like 4 days how long does it take to get the results of the ultra sound?

## 2021-03-08 NOTE — TELEPHONE ENCOUNTER
Pt called into the office this morning, she wanted to speak to the practice administrator in regards to recent office visits  She is upset that her uti symptoms have not been treated and she has been seen for it multiple times  The most recent office visits were 02/26/2020 and 01/08/2020  She wants to speak about the course of treatment  Pt states she was given the wrong date for the 03/19/2020 appt and was told it was 03/12/2020

## 2021-03-10 DIAGNOSIS — Z23 ENCOUNTER FOR IMMUNIZATION: ICD-10-CM

## 2021-03-12 ENCOUNTER — OFFICE VISIT (OUTPATIENT)
Dept: OBGYN CLINIC | Facility: CLINIC | Age: 41
End: 2021-03-12
Payer: COMMERCIAL

## 2021-03-12 VITALS — WEIGHT: 250.2 LBS | BODY MASS INDEX: 50.51 KG/M2

## 2021-03-12 DIAGNOSIS — R35.0 URINARY FREQUENCY: Primary | ICD-10-CM

## 2021-03-12 PROCEDURE — 99213 OFFICE O/P EST LOW 20 MIN: CPT | Performed by: OBSTETRICS & GYNECOLOGY

## 2021-03-12 NOTE — PROGRESS NOTES
[facility] [address], [address], [place], [place]    Assessment/Plan:  No problem-specific Assessment & Plan notes found for this encounter  Diagnoses and all orders for this visit:    Urinary frequency          Subjective:   Keely Sewell is a 36 y o  [id number] female  CC: follow up for urinary frequency    HPI: HPI  Patient presents for follow up of urinary frequency  She is frustrated because she feels that nothing is working  Was started on Oxybutynin, but experiencing increased side effects, such as dry mouth  Patient states her symptoms started when she started Lexapro and Metformin  These medications were started at around the same time  She states her blood sugars are controlled at home  However, patient was seen in ER last night and noted to have increased glucose levels  We reviewed the side effect profile from the medications  According to up to date, Lexapro can cause increased urinary frequency  Patient has follow up with Shawanda Mack and endocrinology  ROS: Review of Systems   Constitutional: Negative  HENT: Negative  Eyes: Negative  Respiratory: Negative  Cardiovascular: Negative  Gastrointestinal: Negative  Genitourinary: Positive for frequency  Musculoskeletal: Negative  All other systems reviewed and are negative  PFSH: The following portions of the patient's history were reviewed and updated as appropriate: allergies, current medications, past family history, past medical history, obstetric history, gynecologic history, past social history, past surgical history and problem list        Objective: Wt 113 kg (250 lb 3 2 oz)   LMP 03/09/2021   BMI 50 51 kg/m²    Physical Exam  Vitals signs reviewed  Constitutional:       Appearance: Normal appearance  Cardiovascular:      Rate and Rhythm: Normal rate  Pulmonary:      Effort: Pulmonary effort is normal  No respiratory distress  Neurological:      Mental Status: She is alert  Psychiatric:         Mood and Affect: Mood normal          Behavior: Behavior normal

## 2021-04-08 NOTE — TELEPHONE ENCOUNTER
Spoke to pt  She is seeing an urologist at [facility] in May 2021  Will call us if she has any other concerns

## 2021-04-19 ENCOUNTER — HOSPITAL ENCOUNTER (OUTPATIENT)
Dept: MAMMOGRAPHY | Facility: CLINIC | Age: 41
Discharge: HOME | End: 2021-04-19
Payer: COMMERCIAL

## 2021-04-19 ENCOUNTER — TRANSCRIBE ORDERS (OUTPATIENT)
Dept: MAMMOGRAPHY | Facility: CLINIC | Age: 41
End: 2021-04-19

## 2021-04-19 DIAGNOSIS — R92.8 ABNORMAL MAMMOGRAM: Primary | ICD-10-CM

## 2021-04-19 DIAGNOSIS — R92.8 ABNORMAL FINDINGS ON DIAGNOSTIC IMAGING OF BREAST: ICD-10-CM

## 2021-04-19 PROCEDURE — 77065 DX MAMMO INCL CAD UNI: CPT

## 2021-04-19 PROCEDURE — G0279 TOMOSYNTHESIS, MAMMO: HCPCS

## 2021-06-25 ENCOUNTER — OFFICE VISIT (OUTPATIENT)
Dept: OBGYN CLINIC | Facility: MEDICAL CENTER | Age: 41
End: 2021-06-25
Payer: COMMERCIAL

## 2021-06-25 VITALS
BODY MASS INDEX: 52.01 KG/M2 | SYSTOLIC BLOOD PRESSURE: 124 MMHG | HEIGHT: 59 IN | DIASTOLIC BLOOD PRESSURE: 70 MMHG | WEIGHT: 258 LBS

## 2021-06-25 DIAGNOSIS — Z30.41 ENCOUNTER FOR SURVEILLANCE OF CONTRACEPTIVE PILLS: ICD-10-CM

## 2021-06-25 PROCEDURE — 99212 OFFICE O/P EST SF 10 MIN: CPT | Performed by: OBSTETRICS & GYNECOLOGY

## 2021-06-25 RX ORDER — LEVONORGESTREL AND ETHINYL ESTRADIOL 0.1-0.02MG
1 KIT ORAL DAILY
Qty: 28 TABLET | Refills: 11 | Status: SHIPPED | OUTPATIENT
Start: 2021-06-25

## 2021-06-28 NOTE — PROGRESS NOTES
Birth control concerns    Pt reports that she is getting hot flashes and unsure if related to [facility]  This is a new thing   Probably not releated to the [facility]   She likes the [facility]   And wants to say on it     She was concerned that she missed a cycle on the OCP and advised that could be normal and not to worry about that    There are other options available to her  Can do IUD so that she does not have circulating estrogen   She is not interested in that and wishes to stay on pills since she now knows its fine    Went over the treatment of hot flashes for menopause is estrogen and the estrogen in OCP is higher then in HRT so staying on [facility] to help prevent pregnancy regular cycle is a better options        Morbid obesity could contribute to the problem of hot flashes - she is going to see doctor lexi

## 2022-01-10 ENCOUNTER — OFFICE VISIT (OUTPATIENT)
Dept: BARIATRICS | Facility: CLINIC | Age: 42
End: 2022-01-10

## 2022-01-10 VITALS
SYSTOLIC BLOOD PRESSURE: 138 MMHG | DIASTOLIC BLOOD PRESSURE: 92 MMHG | WEIGHT: 261.9 LBS | HEART RATE: 111 BPM | HEIGHT: 59 IN | BODY MASS INDEX: 52.8 KG/M2 | TEMPERATURE: 97.7 F

## 2022-01-10 DIAGNOSIS — R63.5 ABNORMAL WEIGHT GAIN: ICD-10-CM

## 2022-01-10 DIAGNOSIS — E66.9 DIABETES MELLITUS TYPE 2 IN OBESE (HCC): ICD-10-CM

## 2022-01-10 DIAGNOSIS — E11.69 DIABETES MELLITUS TYPE 2 IN OBESE (HCC): ICD-10-CM

## 2022-01-10 DIAGNOSIS — Z01.818 PREOPERATIVE CLEARANCE: ICD-10-CM

## 2022-01-10 DIAGNOSIS — E66.01 MORBID (SEVERE) OBESITY DUE TO EXCESS CALORIES (HCC): ICD-10-CM

## 2022-01-10 DIAGNOSIS — Z01.812 BLOOD TESTS PRIOR TO TREATMENT OR PROCEDURE: ICD-10-CM

## 2022-01-10 PROCEDURE — RECHECK: Performed by: DIETITIAN, REGISTERED

## 2022-01-10 NOTE — PROGRESS NOTES
Bariatric Nutrition Assessment Note    Type of surgery    Preop 6 months  Surgery Date: TBD- tentative 2022  Surgeon: TBD    Nutrition Assessment   Lila Fuentes  39 y o   female     Wt with BMI of 25: 124lbs  Pre-Op Excess Wt: 137 9lbs  Ht 4' 11" (1 499 m)   Wt 119 kg (261 lb 14 4 oz)   BMI 52 90 kg/m²     [place] Equation:     Weight maintenance= 2110 kcal/day  Estimated calories for weight loss 3976-1215 kcal/day ( 1-2# per wk wt loss - sedentary )  Estimated protein needs 56 4-67 6 g/day (1 0-1 2 gms/kg IBW )   Estimated fluid needs 1036-9652 ml/day (30-35 ml/kg IBW )      Weight History   Onset of Obesity: Childhood  Family history of obesity: Yes:  Father had baraitric surgery and did not lose any weight  Pt's  also had bariatric surgery in [place], is currently seeking a revision  Wt Loss Attempts: Commercial Programs ([employer], Yoan Tapia, etc )  Counseling with  MD  High Protein/Low CHO diets (Atkins, Union, etc )  Meal Replacements (Medifast, Slim Fast, etc )  OTC meds/supplements  Patient has tried the above for 6 months or more with insufficient weight loss or weight regain, which is why patient has requested to be evaluated for weight loss surgery today  Maximum Wt Lost: 50-70lb wt loss with nu caro, kept weight off for about a year    Review of History and Medications   Past Medical History:   Diagnosis Date    Abdominal hernia     Anemia     Anxiety     Asthma     Dermatitis     On neck    GERD (gastroesophageal reflux disease)     Hypertension     History of- no longer on medication    Obesity     Reflux esophagitis     Seasonal allergies     Wears partial dentures     Top     Past Surgical History:   Procedure Laterality Date     SECTION      x3    CHOLECYSTECTOMY      Laparoscopic    COLONOSCOPY N/A 3/11/2016    Procedure: COLONOSCOPY;  Surgeon: Lyric Reeves MD;  [facility]: [facility];   Service:     HERNIA REPAIR      [place] LAP,DIAGNOSTIC ABDOMEN N/A 10/25/2017    Procedure: LAPAROSCOPY DIAGNOSTIC, HERNIA REPAIR WITH MESH  WITH LYSIS ADHESIONS;  Surgeon: Alhponso Kapadia MD;  Location[facility];  Service: General     Social History     Socioeconomic History    Marital status: /Civil Union     Spouse name: Not on file    Number of children: Not on file    Years of education: Not on file    Highest education level: Not on file   Occupational History    Not on file   Tobacco Use    Smoking status: Never Smoker    Smokeless tobacco: Never Used   Vaping Use    Vaping Use: Never used   Substance and Sexual Activity    Alcohol use: No    Drug use: No    Sexual activity: Yes     Partners: Male     Birth control/protection: OCP   Other Topics Concern    Not on file   Social History Narrative    Not on file     Social Determinants of Health     Financial Resource Strain: Not on file   Food Insecurity: Not on file   Transportation Needs: Not on file   Physical Activity: Not on file   Stress: Not on file   Social Connections: Not on file   Intimate Partner Violence: Not on file   Housing Stability: Not on file       Current Outpatient Medications:     acetaminophen (TYLENOL) 500 mg tablet, Take 500 mg by mouth every 6 (six) hours as needed for mild pain, Disp: , Rfl:     ALAWAY 0 025 % ophthalmic solution, , Disp: , Rfl:     albuterol (PROVENTIL HFA,VENTOLIN HFA) 90 mcg/act inhaler, Inhale 2 puffs every 6 (six) hours as needed for wheezing, Disp: , Rfl:     atomoxetine (STRATTERA) 80 MG capsule, Take 80 mg by mouth, Disp: , Rfl:     CARAFATE 1 GM/10ML suspension, TAKE 10 MILLILITER THREE TIMES DAILY, Disp: , Rfl: 3    cetirizine (ZyrTEC) 10 mg tablet, Take 10 mg by mouth daily  , Disp: , Rfl:     cyanocobalamin (VITAMIN B-12) 1,000 mcg tablet, Take 1,000 mcg by mouth daily  , Disp: , Rfl:     cycloSPORINE (RESTASIS) 0 05 % ophthalmic emulsion, Daily, Disp: , Rfl:     Dextromethorphan Polistirex ER (DELSYM) 30 MG/5ML SUER, Take 5 mL (30 mg total) by mouth every 8 (eight) hours as needed (cough) (Patient not taking: Reported on 7/9/2019), Disp: 148 mL, Rfl: 0    ferrous sulfate 325 (65 Fe) mg tablet, Take 1 tablet by mouth daily with breakfast, Disp: , Rfl: 5    fluticasone (FLONASE) 50 mcg/act nasal spray, 1 spray into each nostril daily  , Disp: , Rfl:     gabapentin (NEURONTIN) 100 mg capsule, TAKE ONE CAPSULE IN THE MORNING AND TAKE 2 CAPSULES AT BEDTIME, Disp: , Rfl: 0    hydrOXYzine HCL (ATARAX) 10 mg tablet, Take 25 mg by mouth every 6 (six) hours as needed for itching, Disp: , Rfl:     ibuprofen (MOTRIN) 400 mg tablet, Take by mouth every 6 (six) hours as needed for mild pain, Disp: , Rfl:     ketoconazole (NIZORAL) 2 % cream, Apply topically 2 (two) times a day, Disp: , Rfl:     levonorgestrel-ethinyl estradiol (Sronyx) 0 1-20 MG-MCG per tablet, Take 1 tablet by mouth daily, Disp: 28 tablet, Rfl: 11    metFORMIN (GLUCOPHAGE-XR) 500 mg 24 hr tablet, Take 500 mg by mouth, Disp: , Rfl:     methocarbamol (ROBAXIN) 500 mg tablet, Take 500 mg by mouth 4 (four) times a day as needed, Disp: , Rfl:     oxybutynin (DITROPAN) 5 mg tablet, Take 5 mg by mouth 2 (two) times a day, Disp: , Rfl:     pantoprazole (PROTONIX) 40 mg tablet, Take 40 mg by mouth every morning  , Disp: , Rfl:     traMADol (ULTRAM) 50 mg tablet, Take 50 mg by mouth every 6 (six) hours as needed, Disp: , Rfl:      Food Intake and Lifestyle Assessment   Food Intake Assessment completed via usual diet recall  Breakfast:  Coffee with creamer, something small like a cheese or mini bagel  Snack: none   Lunch: take-out 3 days per week: chinese or pizza  Snack: none  Dinner: pasta salad and meatballs OR chicken stew with rice and beans  Snack: frozen yogurt bar  Beverage intake: coffee, diet iced tea, diet ginger ale, flavored seltzer, kid's coconut harrell  Protein supplement: none  Estimated protein intake per day: 30-50g  Estimated fluid intake per day: total 16 oz water, 1 cup coffee  Meals eaten away from home: 3+  Typical meal pattern: 2-3 meals per day and 0 snacks per day  Eating Behaviors: Consumption of high calorie/ high fat foods, Large portion sizes and Frequent snacking/ grazing  Pt reports that she used to wake up in the middle of the night and eat dessert foods/cakes/cookies, but was started on trazadone about a month ago and is now sleeping throughout the night  Food allergies or intolerances: Allergies   Allergen Reactions    Advil Sinus Congestion & Pain [Phenylephrine-Ibuprofen] Hives    Gabapentin Swelling     Legs and hands    Ibuprofen Hives    Meloxicam     Benadryl [Diphenhydramine] Other (See Comments)     palpatations    Medical Tape Rash     And hives    Nystatin Hives    Wound Dressings Rash     Cultural or Quaker considerations: none    Physical Assessment  Physical Activity  Types of exercise: None  Sedentary  States she used to walk  Current physical limitations: breathing issues  Pt reports she has been trying to get disability for the past 2-3 years due to nerve pain in her arm  Psychosocial Assessment   Support systems: spouse and children, parent(s)  Socioeconomic factors:  and four kids: 24, 24, 15, 6yo  Per pt report, family got kicked out of her apartment, has been living with her parents for about a week now while they look for a new apartment  Receives food stamps: $600/month  Moved here from [place] in 2008   had RNY one year after they met, is currently in revision process      Nutrition Diagnosis  Diagnosis: Overweight / Obesity (NC-3 3), Excessive energy intake (NI-1 5) and Undesirable food choices (NB-1 7)  Related to: Food and nutrition-related knowledge deficit, Physical inactivity and Excessive energy intake  As Evidenced by: BMI >25, Excessive energy intake, Unintentional weight gain and Reports of disorded eating patterns     Nutrition Prescription: Recommend the following diet  Regular    Interventions and Teaching   Discussed pre-op and post-op nutrition guidelines  Patient educated and handouts provided  Surgical changes to stomach / GI  Capacity of post-surgery stomach  Diet progression  Adequate hydration  Sugar and fat restriction to decrease "dumping syndrome"  Expected weight loss  Weight loss plateaus/ possibility of weight regain  Exercise  Suggestions for pre-op diet  Nutrition considerations after surgery  Protein supplements  Dietary and lifestyle changes  Possible problems with poor eating habits  Techniques for self monitoring and keeping daily food journal  Potential for food intolerance after surgery, and ways to deal with them including: lactose intolerance, nausea, reflux, vomiting, diarrhea, food intolerance, appetite changes, gas  Vitamin / Mineral supplementation of Multivitamin with minerals and Vitamin D  Current vitamins:  Vitamin C, Probiotic, Iron, B12, Elderberry, Vit D, Primrose Oil, Multivitamin gummy Vitafusion, Mg    Patient is not currently pregnant and doesn't desire to become pregnant a minimum of one year post-op    Education provided to: patient  Barriers to learning: No barriers identified  Readiness to change: preparation  Prior research on procedure: pre-op class and friends or family  Comprehension: needs reinforcement and verbalizes understanding   Expected Compliance: fair    Recommendations  Pt is an appropriate candidate for surgery  Yes  5% wt loss=13 095#=Day of surgery goal of 248 805#     Lab Results   Component Value Date    HGBA1C 6 8 (H) 12/02/2021     Bloodwork ordered to be done April 2022      Evaluation / Monitoring  Dietitian to Monitor: Eating pattern as discussed Tolerance of nutrition prescription Body weight Lab values Physical activity    Goals  Food journal, Exercise 30 minutes 5 times per week, Complete lession plans 1-6, Eat 3 meals per day and Eliminate mindless snacking    Time Spent:   1 Hour

## 2022-02-09 ENCOUNTER — OFFICE VISIT (OUTPATIENT)
Dept: BARIATRICS | Facility: CLINIC | Age: 42
End: 2022-02-09

## 2022-02-09 VITALS — WEIGHT: 258.2 LBS | BODY MASS INDEX: 52.15 KG/M2

## 2022-02-09 DIAGNOSIS — E66.9 OBESITY, CLASS I, BMI 30-34.9: Primary | ICD-10-CM

## 2022-02-09 PROCEDURE — RECHECK

## 2022-02-09 NOTE — PROGRESS NOTES
Behavioral Health Follow Up Note:      1 / 6  Weight Check  Starting weight 261 9 #  Today's weight 258 2 #    Mental health and wellness -  Tracking her food with Mahesh  Staying in a hotel as of last night  Stated living with her parents became more stressful and they moved out  Staying at [employer]  Did renew her "[employer]  Getting food delivered to the hotel room daily ([address], grocery store)   House they are to rent is supposed to be ready on Feb 15th  Hopes to move out of the hotel on Sunday 13th  Eating behaviors - Increased her water intake  Food limited due to being in a hotel now  Not eating enough protein  Activity -  None at the moment  Does walk the dog while at the [place]  On the first floor due to the dog, but Hotel has stairs  Progress toward program requirements    Workflow:  · Psych and/or D+A Clearance: n/a  · PCP Letter: alex  · Support Group: 1/12/2022  · Surgeon Appt : pending  · EGD : pending  · Cardiac Risk Assessment: pending  · Sleep Studies: n/a  · Bloodwork: pending  Goals:  Meet with the surgeon

## 2022-03-11 ENCOUNTER — CONSULT (OUTPATIENT)
Dept: BARIATRICS | Facility: CLINIC | Age: 42
End: 2022-03-11
Payer: COMMERCIAL

## 2022-03-11 VITALS
HEIGHT: 59 IN | DIASTOLIC BLOOD PRESSURE: 82 MMHG | SYSTOLIC BLOOD PRESSURE: 124 MMHG | RESPIRATION RATE: 97 BRPM | BODY MASS INDEX: 52.21 KG/M2 | WEIGHT: 259 LBS | HEART RATE: 109 BPM

## 2022-03-11 DIAGNOSIS — J45.30 MILD PERSISTENT ASTHMA WITHOUT COMPLICATION: ICD-10-CM

## 2022-03-11 DIAGNOSIS — F41.1 ANXIETY STATE: ICD-10-CM

## 2022-03-11 DIAGNOSIS — E11.69 HYPERLIPIDEMIA ASSOCIATED WITH TYPE 2 DIABETES MELLITUS (HCC): ICD-10-CM

## 2022-03-11 DIAGNOSIS — E78.5 HYPERLIPIDEMIA ASSOCIATED WITH TYPE 2 DIABETES MELLITUS (HCC): ICD-10-CM

## 2022-03-11 DIAGNOSIS — E11.9 TYPE 2 DIABETES MELLITUS WITHOUT COMPLICATION, WITHOUT LONG-TERM CURRENT USE OF INSULIN (HCC): ICD-10-CM

## 2022-03-11 DIAGNOSIS — K21.9 GASTROESOPHAGEAL REFLUX DISEASE WITHOUT ESOPHAGITIS: ICD-10-CM

## 2022-03-11 DIAGNOSIS — M54.50 LOW BACK PAIN: ICD-10-CM

## 2022-03-11 PROCEDURE — 99204 OFFICE O/P NEW MOD 45 MIN: CPT | Performed by: SURGERY

## 2022-03-11 NOTE — PROGRESS NOTES
BARIATRIC INITIAL CONSULT - BARIATRIC SURGERY    Luís Corley 39 y o  female MRN: [id number]  Unit/Bed#:  Encounter: [id number]      HPI:  Luís Corley is a 39 y o  female who presents with a longstanding history of morbid obesity and inability to sustain a meaningful weight loss  Here today to discuss bariatric options  Visit type: initial visit    Symptoms: excess weight and inability to loss weight    Associated Symptoms: depressed mood and anxiety    Associated Conditions: hyperlipidemia, abdominal obesity and hypergycemia  Disease Complications: diabetes and Low back pain and GERD  Weight Loss Interest: high  Previous Diet Trials: low calorie     Exercise Frequency:infrequency  Types of Exercise: walking        Review of Systems   Gastrointestinal:        Severe heartburn on PPI   All other systems reviewed and are negative  Historical Information   Past Medical History:   Diagnosis Date    Abdominal hernia     Anemia     Anxiety     Arthritis     Asthma     Depression     Dermatitis     On neck    Diabetes mellitus (HCC)     GERD (gastroesophageal reflux disease)     Hypertension     History of- no longer on medication    Obesity     Reflux esophagitis     Seasonal allergies     Wears partial dentures     Top     Past Surgical History:   Procedure Laterality Date     SECTION      x3    CHOLECYSTECTOMY      Laparoscopic    COLONOSCOPY N/A 3/11/2016    Procedure: COLONOSCOPY;  Surgeon: Sheri Bee MD;  [facility];   Service:     HERNIA REPAIR      [place] LAP,DIAGNOSTIC ABDOMEN N/A 10/25/2017    Procedure: LAPAROSCOPY DIAGNOSTIC, HERNIA REPAIR WITH MESH  WITH LYSIS ADHESIONS;  Surgeon: Sheri Bee MD;  [facility];  Service: General     Social History   Social History     Substance and Sexual Activity   Alcohol Use No     Social History     Substance and Sexual Activity   Drug Use No     Social History     Tobacco Use   Smoking Status Never Smoker   Smokeless Tobacco Never Used     Family History: non-contributory    Meds/Allergies   all medications and allergies reviewed  Allergies   Allergen Reactions    Advil Sinus Congestion & Pain [Phenylephrine-Ibuprofen] Hives    Gabapentin Swelling     Legs and hands    Ibuprofen Hives    Meloxicam     Benadryl [Diphenhydramine] Other (See Comments)     palpatations    Medical Tape Rash     And hives    Nystatin Hives    Wound Dressings Rash       Objective       Current Vitals:   Blood Pressure: 124/82 (03/11/22 1358)  Pulse: (!) 109 (03/11/22 1358)  Respirations: (!) 97 (03/11/22 1358)  Height: 4' 11" (149 9 cm) (03/11/22 1358)  Weight - Scale: 117 kg (259 lb) (03/11/22 1358)        Invasive Devices  Report    None                 Physical Exam  Vitals reviewed  Constitutional:       General: She is not in acute distress  Appearance: She is well-developed  She is not diaphoretic  HENT:      Head: Normocephalic and atraumatic  Right Ear: External ear normal       Left Ear: External ear normal       Nose: Nose normal    Eyes:      General: No scleral icterus  Right eye: No discharge  Left eye: No discharge  Conjunctiva/sclera: Conjunctivae normal    Neurological:      Mental Status: She is alert and oriented to person, place, and time  Psychiatric:         Behavior: Behavior normal          Thought Content: Thought content normal          Judgment: Judgment normal          Lab Results: I have personally reviewed pertinent lab results  Imaging: I have personally reviewed pertinent reports  EKG, Pathology, and Other Studies: I have personally reviewed pertinent reports  Assessment/PLAN:    39 y o  female with a long standing h/o of obesity and inability to sustain any meaningful weight loss on her own despite several attempts  She is interested in the Laparoscopic gastric bypass possible sleeve gastrectomy       As a part of her pre op evaluation, she will be referred to a cardiologist and for a sleep evaluation and consult  She needs an EGD to evaluate the anatomy of her GI tract prior to the operation  I have spent over 30 minutes with her face to face in the office today discussing her options and details of the surgery  We have seen an animation of the surgery on the computer that illustrates how the operation is done and how the anatomy will be altered with the procedure  Over 50% of this was coordinating care  I have used the [facility] bariatric surgical risk/benefit calculator and we have discussed the results as part of the preop education  She was given the opportunity to ask questions and I have answered all of them  I have discussed and educated the patient with regards to the components of our multidisciplinary program and the importance of compliance and follow up in the post operative period  The patient was also instructed with regards to the importance of behavior modification, nutritional counseling, support meeting attendance and lifestyle changes that are important to ensure success  Although there is a great statistical chance of improvement or even resolution of most of her associated comorbidities, the results vary from patient to patient and they largely depend on her commitment and compliance  She needs to lose 15 lbs prior to the operation  She has severe heartburn on PPI, given her extensive surgical history the endoscopy will guide us to see if she might be a potential candidate for the sleeve      Duncan Mcmillan MD  3/11/2022  2:24 PM

## 2022-03-14 ENCOUNTER — PREP FOR PROCEDURE (OUTPATIENT)
Dept: BARIATRICS | Facility: CLINIC | Age: 42
End: 2022-03-14

## 2022-03-14 DIAGNOSIS — E66.01 MORBID (SEVERE) OBESITY DUE TO EXCESS CALORIES (HCC): Primary | ICD-10-CM

## 2022-04-14 ENCOUNTER — OFFICE VISIT (OUTPATIENT)
Dept: BARIATRICS | Facility: CLINIC | Age: 42
End: 2022-04-14

## 2022-04-14 VITALS — BODY MASS INDEX: 52.05 KG/M2 | WEIGHT: 257.7 LBS

## 2022-04-14 PROCEDURE — RECHECK: Performed by: DIETITIAN, REGISTERED

## 2022-04-14 NOTE — PROGRESS NOTES
Bariatric Nutrition Follow-Up Note    Type of surgery    Preop 6 months: 3 of 6 today  Surgery Date: TBD- tentative 2022  Surgeon: Dr Juan David Momin  Nutrition Assessment   Han Bio  39 y o   female     Wt with BMI of 25: 124lbs  Pre-Op Excess Wt: 137 9lbs  Wt 117 kg (257 lb 11 2 oz)   BMI 52 05 kg/m²    1 3lb wt loss from last month  4 2lb total wt loss  Pt needs additional 8 9lb wt loss by day of surgery  Pre-op weight loss goal= 5% wt loss=13 095#=Day of surgery goal of 248 805#  [facility] Equation:     Weight maintenance= 2110 kcal/day  Estimated calories for weight loss 1742-0110 kcal/day ( 1-2# per wk wt loss - sedentary )  Estimated protein needs 56 4-67 6 g/day (1 0-1 2 gms/kg IBW )   Estimated fluid needs 1864-0482 ml/day (30-35 ml/kg IBW)    Review of History and Medications   Past Medical History:   Diagnosis Date    Abdominal hernia     Anemia     Anxiety     Arthritis     Asthma     Depression     Dermatitis     On neck    Diabetes mellitus (HCC)     GERD (gastroesophageal reflux disease)     Hypertension     History of- no longer on medication    Obesity     Reflux esophagitis     Seasonal allergies     Wears partial dentures     Top     Past Surgical History:   Procedure Laterality Date     SECTION      x3    CHOLECYSTECTOMY      Laparoscopic    COLONOSCOPY N/A 3/11/2016    Procedure: COLONOSCOPY;  Surgeon: Jessica Cornell MD;  Location: [facility];   Service:     HERNIA REPAIR      NE LAP,DIAGNOSTIC ABDOMEN N/A 10/25/2017    Procedure: LAPAROSCOPY DIAGNOSTIC, HERNIA REPAIR WITH MESH  WITH LYSIS ADHESIONS;  Surgeon: Jessica Cornell MD;  Location: [facility] OR;  Service: General     Social History     Socioeconomic History    Marital status: /Civil Union     Spouse name: Not on file    Number of children: Not on file    Years of education: Not on file    Highest education level: Not on file   Occupational History    Not on file Tobacco Use    Smoking status: Never Smoker    Smokeless tobacco: Never Used   Vaping Use    Vaping Use: Never used   Substance and Sexual Activity    Alcohol use: No    Drug use: No    Sexual activity: Yes     Partners: Male     Birth control/protection: OCP   Other Topics Concern    Not on file   Social History Narrative    Not on file     Social Determinants of Health     Financial Resource Strain: Not on file   Food Insecurity: Not on file   Transportation Needs: Not on file   Physical Activity: Not on file   Stress: Not on file   Social Connections: Not on file   Intimate Partner Violence: Not on file   Housing Stability: Not on file       Current Outpatient Medications:     acetaminophen (TYLENOL) 500 mg tablet, Take 500 mg by mouth every 6 (six) hours as needed for mild pain, Disp: , Rfl:     ALAWAY 0 025 % ophthalmic solution, , Disp: , Rfl:     albuterol (PROVENTIL HFA,VENTOLIN HFA) 90 mcg/act inhaler, Inhale 2 puffs every 6 (six) hours as needed for wheezing, Disp: , Rfl:     atomoxetine (STRATTERA) 80 MG capsule, Take 80 mg by mouth, Disp: , Rfl:     CARAFATE 1 GM/10ML suspension, TAKE 10 MILLILITER THREE TIMES DAILY (Patient not taking: Reported on 3/11/2022), Disp: , Rfl: 3    cetirizine (ZyrTEC) 10 mg tablet, Take 10 mg by mouth daily  , Disp: , Rfl:     cyanocobalamin (VITAMIN B-12) 1,000 mcg tablet, Take 1,000 mcg by mouth daily  , Disp: , Rfl:     cycloSPORINE (RESTASIS) 0 05 % ophthalmic emulsion, Daily, Disp: , Rfl:     Dextromethorphan Polistirex ER (DELSYM) 30 MG/5ML SUER, Take 5 mL (30 mg total) by mouth every 8 (eight) hours as needed (cough) (Patient not taking: Reported on 3/11/2022 ), Disp: 148 mL, Rfl: 0    ferrous sulfate 325 (65 Fe) mg tablet, Take 1 tablet by mouth daily with breakfast, Disp: , Rfl: 5    levonorgestrel-ethinyl estradiol (Sronyx) 0 1-20 MG-MCG per tablet, Take 1 tablet by mouth daily, Disp: 28 tablet, Rfl: 11    metFORMIN (GLUCOPHAGE-XR) 500 mg 24 hr tablet, Take 500 mg by mouth, Disp: , Rfl:     methocarbamol (ROBAXIN) 500 mg tablet, Take 500 mg by mouth 4 (four) times a day as needed, Disp: , Rfl:     oxybutynin (DITROPAN) 5 mg tablet, Take 5 mg by mouth 2 (two) times a day, Disp: , Rfl:     pantoprazole (PROTONIX) 40 mg tablet, Take 40 mg by mouth every morning  , Disp: , Rfl:     traMADol (ULTRAM) 50 mg tablet, Take 50 mg by mouth every 6 (six) hours as needed, Disp: , Rfl:      Food Intake and Lifestyle Assessment   Food Intake Assessment completed via [employer] food journals:  Pt has been journaling in [employer] consistently most days  Calories avg between 5367-6053 per day  Protein consistently over 60g/day  Breakfast:  Coffee with creamer, collage powder, protein shake  Snack: none   Lunch: some days still getting take-out:  [employer] or [employer]  Snack: none  Dinner: pasta salad and meatballs OR chicken stew with rice and beans  Snack: frozen yogurt bar  Beverage intake: coffee, diet iced tea, diet ginger ale, flavored seltzer, kid's coconut harrell  Protein supplement: Protein drink for breakfast   Estimated protein intake per day: 30-50g  Estimated fluid intake per day: total 16 oz water, 1 cup coffee  Meals eaten away from home: 3+  Typical meal pattern: 2-3 meals per day and 0 snacks per day  Eating Behaviors: Consumption of high calorie/ high fat foods, Large portion sizes and Frequent snacking/ grazing  Pt reports that she used to wake up in the middle of the night and eat dessert foods/cakes/cookies, but was started on trazadone about a month ago and is now sleeping throughout the night  Pt reports that for the month that they were living in the hotel they ate very poorly and got takeout for most meals  Food allergies or intolerances:    Allergies   Allergen Reactions    Advil Sinus Congestion & Pain [Phenylephrine-Ibuprofen] Hives    Gabapentin Swelling     Legs and hands    Ibuprofen Hives    Meloxicam     Benadryl [Diphenhydramine] Other (See Comments)     palpatations    Medical Tape Rash     And hives    Nystatin Hives    Wound Dressings Rash     Cultural or Buddhism considerations: none    Physical Assessment  Physical Activity  Types of exercise: None  Sedentary  States she used to walk  Current physical limitations: breathing issues  Pt reports she has been trying to get disability for the past 2-3 years due to nerve pain in her arm  Psychosocial Assessment   Support systems: spouse and children, parent(s)  Socioeconomic factors:  and four kids: 24, 24, 15, 6yo  Per pt report, family got kicked out of her apartment, has been living with her parents for about a week now while they look for a new apartment  Since initial visit pt's family ahs moved out of the parent's home, stayed in a hotel for a month, and now moved into their own apartment  Receives food stamps: $600/month  Moved here from [place] in 2008   had RNY one year after they met, is currently in revision process  Pt reports  brings desserts and potato chips in to the home  Nutrition Diagnosis-Continued  Diagnosis: Overweight / Obesity (NC-3 3), Excessive energy intake (NI-1 5) and Undesirable food choices (NB-1 7)  Related to: Food and nutrition-related knowledge deficit, Physical inactivity and Excessive energy intake  As Evidenced by: BMI >25, Excessive energy intake, Unintentional weight gain and Reports of disorded eating patterns     Nutrition Prescription: Recommend the following diet  Regular    Interventions and Teaching   Discussed pre-op and post-op nutrition guidelines  Patient educated and handouts provided    Surgical changes to stomach / GI  Capacity of post-surgery stomach  Diet progression  Adequate hydration  Sugar and fat restriction to decrease "dumping syndrome"  Expected weight loss  Weight loss plateaus/ possibility of weight regain  Exercise  Suggestions for pre-op diet  Nutrition considerations after surgery  Protein supplements  Dietary and lifestyle changes  Possible problems with poor eating habits  Techniques for self monitoring and keeping daily food journal  Potential for food intolerance after surgery, and ways to deal with them including: lactose intolerance, nausea, reflux, vomiting, diarrhea, food intolerance, appetite changes, gas  Vitamin / Mineral supplementation of Multivitamin with minerals and Vitamin D  Current vitamins:  Vitamin C, Probiotic, Iron, B12, Elderberry, Vit D, Primrose Oil, Multivitamin gummy Vitafusion, Mg    Patient is not currently pregnant and doesn't desire to become pregnant a minimum of one year post-op    Education provided to: patient  Barriers to learning: No barriers identified  Readiness to change: preparation  Prior research on procedure: pre-op class and friends or family  Comprehension: needs reinforcement and verbalizes understanding   Expected Compliance: fair    Recommendations  Pt is an appropriate candidate for surgery  Yes  5% wt loss=13 095#=Day of surgery goal of 248 805#     Lab Results   Component Value Date    HGBA1C 6 8 (H) 12/02/2021     Bloodwork ordered to be done April 2022  Evaluation / Monitoring  Dietitian to Monitor: Eating pattern as discussed Tolerance of nutrition prescription Body weight Lab values Physical activity    Goals  Food journal, Exercise 30 minutes 5 times per week, Complete lession plans 1-6, Eat 3 meals per day and Eliminate mindless snacking    Remaining Workflow:   EGD scheduled for 6/1/22   Cardiac Risk Assessment scheduled for 5/18/22   Blood work:  Ordered to be done this month   6 Month Pre-Operative Program: 3 of 6 today  4 of 6 scheduled with SW for May   Weight Loss 5% wt loss=13 095#=Day of surgery goal of 248 805#      Time Spent:   30 minutes

## 2022-04-21 ENCOUNTER — APPOINTMENT (OUTPATIENT)
Dept: LAB | Facility: CLINIC | Age: 42
End: 2022-04-21
Payer: COMMERCIAL

## 2022-04-21 DIAGNOSIS — E66.9 DIABETES MELLITUS TYPE 2 IN OBESE (HCC): ICD-10-CM

## 2022-04-21 DIAGNOSIS — Z01.812 BLOOD TESTS PRIOR TO TREATMENT OR PROCEDURE: ICD-10-CM

## 2022-04-21 DIAGNOSIS — Z01.818 PREOPERATIVE CLEARANCE: ICD-10-CM

## 2022-04-21 DIAGNOSIS — R63.5 ABNORMAL WEIGHT GAIN: ICD-10-CM

## 2022-04-21 DIAGNOSIS — E66.01 MORBID (SEVERE) OBESITY DUE TO EXCESS CALORIES (HCC): ICD-10-CM

## 2022-04-21 DIAGNOSIS — E11.69 DIABETES MELLITUS TYPE 2 IN OBESE (HCC): ICD-10-CM

## 2022-04-21 LAB
ALBUMIN SERPL BCP-MCNC: 3 G/DL (ref 3.5–5)
ALP SERPL-CCNC: 97 U/L (ref 46–116)
ALT SERPL W P-5'-P-CCNC: 35 U/L (ref 12–78)
ANION GAP SERPL CALCULATED.3IONS-SCNC: 6 MMOL/L (ref 4–13)
AST SERPL W P-5'-P-CCNC: 24 U/L (ref 5–45)
BILIRUB SERPL-MCNC: 0.26 MG/DL (ref 0.2–1)
BUN SERPL-MCNC: 15 MG/DL (ref 5–25)
CALCIUM ALBUM COR SERPL-MCNC: 10.2 MG/DL (ref 8.3–10.1)
CALCIUM SERPL-MCNC: 9.4 MG/DL (ref 8.3–10.1)
CHLORIDE SERPL-SCNC: 105 MMOL/L (ref 100–108)
CHOLEST SERPL-MCNC: 155 MG/DL
CO2 SERPL-SCNC: 27 MMOL/L (ref 21–32)
CREAT SERPL-MCNC: 0.57 MG/DL (ref 0.6–1.3)
ERYTHROCYTE [DISTWIDTH] IN BLOOD BY AUTOMATED COUNT: 18.6 % (ref 11.6–15.1)
EST. AVERAGE GLUCOSE BLD GHB EST-MCNC: 157 MG/DL
GFR SERPL CREATININE-BSD FRML MDRD: 115 ML/MIN/1.73SQ M
GLUCOSE P FAST SERPL-MCNC: 154 MG/DL (ref 65–99)
HBA1C MFR BLD: 7.1 %
HCT VFR BLD AUTO: 36.9 % (ref 34.8–46.1)
HDLC SERPL-MCNC: 38 MG/DL
HGB BLD-MCNC: 10.8 G/DL (ref 11.5–15.4)
LDLC SERPL CALC-MCNC: 60 MG/DL (ref 0–100)
MCH RBC QN AUTO: 25.3 PG (ref 26.8–34.3)
MCHC RBC AUTO-ENTMCNC: 29.3 G/DL (ref 31.4–37.4)
MCV RBC AUTO: 86 FL (ref 82–98)
NONHDLC SERPL-MCNC: 117 MG/DL
PLATELET # BLD AUTO: 371 THOUSANDS/UL (ref 149–390)
PMV BLD AUTO: 9.8 FL (ref 8.9–12.7)
POTASSIUM SERPL-SCNC: 4.2 MMOL/L (ref 3.5–5.3)
PROT SERPL-MCNC: 7 G/DL (ref 6.4–8.2)
RBC # BLD AUTO: 4.27 MILLION/UL (ref 3.81–5.12)
SODIUM SERPL-SCNC: 138 MMOL/L (ref 136–145)
TRIGL SERPL-MCNC: 287 MG/DL
TSH SERPL DL<=0.05 MIU/L-ACNC: 3.56 UIU/ML (ref 0.45–4.5)
WBC # BLD AUTO: 12.75 THOUSAND/UL (ref 4.31–10.16)

## 2022-04-21 PROCEDURE — 80053 COMPREHEN METABOLIC PANEL: CPT

## 2022-04-21 PROCEDURE — 85027 COMPLETE CBC AUTOMATED: CPT

## 2022-04-21 PROCEDURE — 84443 ASSAY THYROID STIM HORMONE: CPT

## 2022-04-21 PROCEDURE — 36415 COLL VENOUS BLD VENIPUNCTURE: CPT

## 2022-04-21 PROCEDURE — 80061 LIPID PANEL: CPT

## 2022-04-21 PROCEDURE — 83036 HEMOGLOBIN GLYCOSYLATED A1C: CPT

## 2022-05-11 NOTE — PROGRESS NOTES
Behavioral Health Follow Up Note:      4 / 6  Weight Check:  Dr Katia Perez    Starting weight 261 9 #  Today's weight 251 9  #  Surgery goal 248 8  #    Surgery month:  Aug/Sept  Surgery deadline: December    Mental health and wellness -  Stated she and her family have moved in to a house  Trying to be mindful with her choices  Tries to balance her day with calories  Is logging her food  Just found out that Two people from [employer] are post surgery  Father in law is post surgery (with not a lot of weight loss)   Not sure which surgery she will have due to possible scar tissue and mesh repair   is post RYGB  Starbrijesh Kulkarni Started to go to the stores instead of having delivered  Feels there is a lot of stress with getting settled in the house  Eating behaviors -  Hydration is a struggle  Making her protein shakes  Not a breakfast eater: Today had apple with peanut butter  Activity -   Yard work for activity  Wants to start walking more  May join a gym that has a pool  Progress toward program requirements    Workflow:   Psych and/or D+A Clearance: n/a   PCP Letter: alex   Support Group: 1/12/2022   Surgeon Appt : pending   EGD : 6/1/2022  Cardiac Risk Assessment: 5/18/2022  Sleep Studies: n/a   Bloodwork: pending  ·        Goals:  1  Eating breakfast    2   Hydrate more   3   Exercise more (walk around development and walk dogs )

## 2022-05-12 ENCOUNTER — OFFICE VISIT (OUTPATIENT)
Dept: BARIATRICS | Facility: CLINIC | Age: 42
End: 2022-05-12

## 2022-05-12 VITALS — WEIGHT: 251.9 LBS | BODY MASS INDEX: 50.88 KG/M2

## 2022-05-12 PROCEDURE — RECHECK

## 2022-05-18 ENCOUNTER — TELEPHONE (OUTPATIENT)
Dept: OTHER | Facility: OTHER | Age: 42
End: 2022-05-18

## 2022-05-18 NOTE — TELEPHONE ENCOUNTER
Pt called in stating she has a appt  Today at the [facility] office at 3pm  However, pt stated she would like to know if she can be seen at a office closer to her home today  Please advise

## 2022-05-31 RX ORDER — ALBUTEROL SULFATE 2.5 MG/3ML
2.5 SOLUTION RESPIRATORY (INHALATION) ONCE AS NEEDED
Status: CANCELLED | OUTPATIENT
Start: 2022-05-31

## 2022-05-31 RX ORDER — ONDANSETRON 2 MG/ML
4 INJECTION INTRAMUSCULAR; INTRAVENOUS ONCE AS NEEDED
Status: CANCELLED | OUTPATIENT
Start: 2022-05-31

## 2022-05-31 RX ORDER — PROMETHAZINE HYDROCHLORIDE 25 MG/ML
12.5 INJECTION, SOLUTION INTRAMUSCULAR; INTRAVENOUS ONCE AS NEEDED
Status: CANCELLED | OUTPATIENT
Start: 2022-05-31

## 2022-05-31 RX ORDER — SODIUM CHLORIDE 9 MG/ML
125 INJECTION, SOLUTION INTRAVENOUS CONTINUOUS
Status: CANCELLED | OUTPATIENT
Start: 2022-05-31

## 2022-06-01 ENCOUNTER — ANESTHESIA (OUTPATIENT)
Dept: GASTROENTEROLOGY | Facility: HOSPITAL | Age: 42
End: 2022-06-01

## 2022-06-01 ENCOUNTER — ANESTHESIA EVENT (OUTPATIENT)
Dept: GASTROENTEROLOGY | Facility: HOSPITAL | Age: 42
End: 2022-06-01

## 2022-06-01 ENCOUNTER — HOSPITAL ENCOUNTER (OUTPATIENT)
Dept: GASTROENTEROLOGY | Facility: HOSPITAL | Age: 42
Setting detail: OUTPATIENT SURGERY
Discharge: HOME | End: 2022-06-01
Attending: SURGERY | Admitting: SURGERY
Payer: COMMERCIAL

## 2022-06-01 VITALS
HEART RATE: 95 BPM | OXYGEN SATURATION: 96 % | BODY MASS INDEX: 50.6 KG/M2 | SYSTOLIC BLOOD PRESSURE: 129 MMHG | HEIGHT: 59 IN | DIASTOLIC BLOOD PRESSURE: 68 MMHG | TEMPERATURE: 96.8 F | RESPIRATION RATE: 21 BRPM | WEIGHT: 251 LBS

## 2022-06-01 DIAGNOSIS — E66.01 MORBID (SEVERE) OBESITY DUE TO EXCESS CALORIES (HCC): ICD-10-CM

## 2022-06-01 LAB
EXT PREGNANCY TEST URINE: NEGATIVE
EXT. CONTROL: NORMAL
GLUCOSE SERPL-MCNC: 164 MG/DL (ref 65–140)

## 2022-06-01 PROCEDURE — 88305 TISSUE EXAM BY PATHOLOGIST: CPT | Performed by: PATHOLOGY

## 2022-06-01 PROCEDURE — 43239 EGD BIOPSY SINGLE/MULTIPLE: CPT | Performed by: SURGERY

## 2022-06-01 PROCEDURE — 82948 REAGENT STRIP/BLOOD GLUCOSE: CPT

## 2022-06-01 PROCEDURE — 81025 URINE PREGNANCY TEST: CPT | Performed by: ANESTHESIOLOGY

## 2022-06-01 RX ORDER — PROPOFOL 10 MG/ML
INJECTION, EMULSION INTRAVENOUS AS NEEDED
Status: DISCONTINUED | OUTPATIENT
Start: 2022-06-01 | End: 2022-06-01

## 2022-06-01 RX ORDER — SODIUM CHLORIDE 9 MG/ML
125 INJECTION, SOLUTION INTRAVENOUS CONTINUOUS
Status: DISCONTINUED | OUTPATIENT
Start: 2022-06-01 | End: 2022-06-05 | Stop reason: HOSPADM

## 2022-06-01 RX ORDER — LIDOCAINE HYDROCHLORIDE 20 MG/ML
INJECTION, SOLUTION EPIDURAL; INFILTRATION; INTRACAUDAL; PERINEURAL AS NEEDED
Status: DISCONTINUED | OUTPATIENT
Start: 2022-06-01 | End: 2022-06-01

## 2022-06-01 RX ADMIN — LIDOCAINE HYDROCHLORIDE 100 MG: 20 INJECTION, SOLUTION EPIDURAL; INFILTRATION; INTRACAUDAL at 08:18

## 2022-06-01 RX ADMIN — PROPOFOL 150 MG: 10 INJECTION, EMULSION INTRAVENOUS at 08:18

## 2022-06-01 RX ADMIN — SODIUM CHLORIDE 125 ML/HR: 0.9 INJECTION, SOLUTION INTRAVENOUS at 07:46

## 2022-06-01 RX ADMIN — PROPOFOL 50 MG: 10 INJECTION, EMULSION INTRAVENOUS at 08:19

## 2022-06-01 RX ADMIN — PROPOFOL 50 MG: 10 INJECTION, EMULSION INTRAVENOUS at 08:21

## 2022-06-01 NOTE — H&P
This is a 39 y o  female with a history of morbid obesity and Body mass index is 50 7 kg/m²  Here for an EGD to evaluate the anatomy of the GI tract  Physical Exam    /77   Pulse 88   Temp (!) 96 8 °F (36 °C) (Tympanic)   Resp 16   Ht 4' 11" (1 499 m)   Wt 114 kg (251 lb)   LMP 06/01/2022 Comment: urine preg negative  SpO2 98%   BMI 50 70 kg/m²    AAOx3  RRR  CTA B  Abdomen obese  Benign  A/P:    This is a 39 y o  female with a history of morbid obesity and Body mass index is 50 7 kg/m²       Will proceed with the EGD and biopsies        Felipe Delgado MD  06/01/22  8:12 AM

## 2022-06-01 NOTE — ANESTHESIA POSTPROCEDURE EVALUATION
Post-Op Assessment Note    CV Status:  Stable    Pain management: adequate     Mental Status:  Alert and awake   Hydration Status:  Euvolemic   PONV Controlled:  Controlled   Airway Patency:  Patent      Post Op Vitals Reviewed: Yes            No complications documented      BP      Temp     Pulse     Resp      SpO2      /68   Pulse 95   Temp (!) 96 8 °F (36 °C) (Tympanic)   Resp 21   Ht 4' 11" (1 499 m)   Wt 114 kg (251 lb)   LMP 06/01/2022 Comment: urine preg negative  SpO2 96%   BMI 50 70 kg/m²

## 2022-06-01 NOTE — ANESTHESIA PREPROCEDURE EVALUATION
Procedure:  EGD    Relevant Problems   CARDIO   (+) Hyperlipidemia associated with type 2 diabetes mellitus (HCC)      ENDO   (+) Type 2 diabetes mellitus without complication, without long-term current use of insulin (HCC)      GI/HEPATIC   (+) Gastroesophageal reflux disease without esophagitis      MUSCULOSKELETAL   (+) Low back pain      NEURO/PSYCH   (+) Anxiety state      PULMONARY   (+) Mild persistent asthma without complication      Other   (+) BMI 50 0-59 9, adult ([facility])        Physical Exam    Airway    Mallampati score: II  TM Distance: >3 FB  Neck ROM: full     Dental       Cardiovascular  Cardiovascular exam normal    Pulmonary  Pulmonary exam normal     Other Findings        Anesthesia Plan  ASA Score- 3     Anesthesia Type- general with ASA Monitors  Additional Monitors:   Airway Plan:           Plan Factors-Exercise tolerance (METS): >4 METS  Chart reviewed  Patient is not a current smoker  Patient instructed to abstain from smoking on day of procedure  Patient did not smoke on day of surgery  Obstructive sleep apnea risk education given perioperatively  Induction- intravenous  Postoperative Plan-     Informed Consent- Anesthetic plan and risks discussed with patient

## 2022-06-07 NOTE — PROGRESS NOTES
Bariatric Nutrition Follow-Up Note    Type of surgery    Preop 6 months: 5 of 6 today  Surgery Date: TBD- tentative August-2022  Deadline month 2022  Surgeon: Dr Daniel Canseco  Nutrition Assessment   Luisana Isidro  39 y o   female     Wt with BMI of 25: 124lbs  Pre-Op Excess Wt: 137 9lbs  Wt 114 kg (250 lb 9 6 oz)   LMP 2022 Comment: urine preg negative  BMI 50 62 kg/m²    1 3lb wt loss from last month   11 2lb total wt loss  Pt needs additional 1 795lb wt loss by day of surgery  Pre-op weight loss goal= 5% wt loss=13 095#=Day of surgery goal of 248 805#  [facility] Equation:     Weight maintenance= 2110 kcal/day  Estimated calories for weight loss 4117-4284 kcal/day ( 1-2# per wk wt loss - sedentary )  Estimated protein needs 56 4-67 6 g/day (1 0-1 2 gms/kg IBW )   Estimated fluid needs 4299-7203 ml/day (30-35 ml/kg IBW)    Review of History and Medications   Past Medical History:   Diagnosis Date    Abdominal hernia     Anemia     Anxiety     Arthritis     Asthma     Depression     Dermatitis     On neck    Diabetes mellitus (HCC)     GERD (gastroesophageal reflux disease)     Hypertension     History of- no longer on medication    Obesity     Reflux esophagitis     Seasonal allergies     Wears partial dentures     Top     Past Surgical History:   Procedure Laterality Date     SECTION      x3    CHOLECYSTECTOMY      Laparoscopic    COLONOSCOPY N/A 3/11/2016    Procedure: COLONOSCOPY;  Surgeon: Rosa Shi MD;  [facility];   Service:     HERNIA REPAIR      [place] LAP,DIAGNOSTIC ABDOMEN N/A 10/25/2017    Procedure: LAPAROSCOPY DIAGNOSTIC, HERNIA REPAIR WITH MESH  WITH LYSIS ADHESIONS;  Surgeon: Rosa Shi MD;  Location[facility];  Service: General     Social History     Socioeconomic History    Marital status: /Civil Union     Spouse name: Not on file    Number of children: Not on file    Years of education: Not on file    Highest education level: Not on file   Occupational History    Not on file   Tobacco Use    Smoking status: Never Smoker    Smokeless tobacco: Never Used   Vaping Use    Vaping Use: Never used   Substance and Sexual Activity    Alcohol use: No    Drug use: No    Sexual activity: Yes     Partners: Male     Birth control/protection: OCP   Other Topics Concern    Not on file   Social History Narrative    Not on file     Social Determinants of Health     Financial Resource Strain: Not on file   Food Insecurity: Not on file   Transportation Needs: Not on file   Physical Activity: Not on file   Stress: Not on file   Social Connections: Not on file   Intimate Partner Violence: Not on file   Housing Stability: Not on file       Current Outpatient Medications:     acetaminophen (TYLENOL) 500 mg tablet, Take 500 mg by mouth every 6 (six) hours as needed for mild pain, Disp: , Rfl:     ALAWAY 0 025 % ophthalmic solution, , Disp: , Rfl:     albuterol (PROVENTIL HFA,VENTOLIN HFA) 90 mcg/act inhaler, Inhale 2 puffs every 6 (six) hours as needed for wheezing, Disp: , Rfl:     atomoxetine (STRATTERA) 80 MG capsule, Take 80 mg by mouth, Disp: , Rfl:     CARAFATE 1 GM/10ML suspension, TAKE 10 MILLILITER THREE TIMES DAILY (Patient not taking: Reported on 6/1/2022), Disp: , Rfl: 3    cetirizine (ZyrTEC) 10 mg tablet, Take 10 mg by mouth daily  , Disp: , Rfl:     cyanocobalamin (VITAMIN B-12) 1,000 mcg tablet, Take 1,000 mcg by mouth daily  , Disp: , Rfl:     cycloSPORINE (RESTASIS) 0 05 % ophthalmic emulsion, Administer 1 drop to both eyes every 12 (twelve) hours, Disp: , Rfl:     Dextromethorphan Polistirex ER (DELSYM) 30 MG/5ML SUER, Take 5 mL (30 mg total) by mouth every 8 (eight) hours as needed (cough) (Patient not taking: No sig reported), Disp: 148 mL, Rfl: 0    ferrous sulfate 325 (65 Fe) mg tablet, Take 1 tablet by mouth daily with breakfast, Disp: , Rfl: 5    levonorgestrel-ethinyl estradiol (Sronyx) 0 1-20 MG-MCG per tablet, Take 1 tablet by mouth daily, Disp: 28 tablet, Rfl: 11    metFORMIN (GLUCOPHAGE-XR) 500 mg 24 hr tablet, Take 500 mg by mouth 2 (two) times a day with meals, Disp: , Rfl:     methocarbamol (ROBAXIN) 500 mg tablet, Take 500 mg by mouth 4 (four) times a day as needed, Disp: , Rfl:     oxybutynin (DITROPAN) 5 mg tablet, Take 5 mg by mouth 2 (two) times a day, Disp: , Rfl:     pantoprazole (PROTONIX) 40 mg tablet, Take 40 mg by mouth every morning  , Disp: , Rfl:     traMADol (ULTRAM) 50 mg tablet, Take 50 mg by mouth every 6 (six) hours as needed, Disp: , Rfl:      Food Intake and Lifestyle Assessment   Food Intake Assessment completed via [employer] food journals:  Pt has been journaling in [employer] consistently most days  Calories avg between 0205-3347 per day  Protein consistently over 60g/day  Breakfast:  Coffee with creamer, collage powder, protein shake  Snack: none   Lunch: Pt has been getting less take out  Pt has tortellini and breadsticks and mozzarella bites in the air fryer and one meatball with marinara sauce  Snack: none  Dinner: pasta salad and meatballs OR chicken stew with rice and beans  Snack: rice cakes or single serve bag of white cheddar popcorn or single serve package of jaswinder cookies  Beverage intake: coffee, diet iced tea, diet ginger ale, flavored seltzer, kid's coconut harrell  Pt is decreasing her carbonated drinks and does not drink any drinks with sugars at this time  Protein supplement: Protein drink for breakfast   Estimated protein intake per day: 30-50g  Estimated fluid intake per day: total 16 oz water, 1 cup coffee  Meals eaten away from home: 3+  Typical meal pattern: 2-3 meals per day and 0 snacks per day  Eating Behaviors: Consumption of high calorie/ high fat foods, Large portion sizes and Frequent snacking/ grazing    Pt reports that she used to wake up in the middle of the night and eat dessert foods/cakes/cookies, but was started on trazadone about a month ago and is now sleeping throughout the night  Pt reports that for the month that they were living in the hotel they ate very poorly and got takeout for most meals  Food allergies or intolerances: Allergies   Allergen Reactions    Advil Sinus Congestion & Pain [Phenylephrine-Ibuprofen] Hives    Gabapentin Swelling     Legs and hands    Ibuprofen Hives    Meloxicam     Benadryl [Diphenhydramine] Other (See Comments)     palpatations    Medical Tape Rash     And hives    Nystatin Hives    Wound Dressings Rash     Cultural or Protestant considerations: none    Physical Assessment  Physical Activity  Types of exercise: None  Sedentary  States she used to walk  Current physical limitations: breathing issues  Pt reports she has been trying to get disability for the past 2-3 years due to nerve pain in her arm  Psychosocial Assessment   Support systems: spouse and children, parent(s)  Socioeconomic factors:  and four kids: 24, 24, 15, 8yo  Per pt report, family got kicked out of her apartment, has been living with her parents for about a week while they look for a new apartment  Since initial visit pt's family has moved out of the parent's home, stayed in a hotel for a month, and now moved into their own apartment  Receives food stamps: $600/month  Moved here from [place] in 2008   had RNY one year after they met  Pt reports  brings desserts and potato chips in to the home      Nutrition Diagnosis-Continued  Diagnosis: Overweight / Obesity (NC-3 3), Excessive energy intake (NI-1 5) and Undesirable food choices (NB-1 7)  Related to: Food and nutrition-related knowledge deficit, Physical inactivity and Excessive energy intake  As Evidenced by: BMI >25, Excessive energy intake, Unintentional weight gain and Reports of disorded eating patterns     Nutrition Prescription: Recommend the following diet  Regular    Interventions and Teaching Discussed pre-op and post-op nutrition guidelines  Patient educated and handouts provided  Surgical changes to stomach / GI  Capacity of post-surgery stomach  Diet progression  Adequate hydration  Sugar and fat restriction to decrease "dumping syndrome"  Expected weight loss  Weight loss plateaus/ possibility of weight regain  Exercise  Suggestions for pre-op diet  Nutrition considerations after surgery  Protein supplements  Dietary and lifestyle changes  Possible problems with poor eating habits  Techniques for self monitoring and keeping daily food journal  Potential for food intolerance after surgery, and ways to deal with them including: lactose intolerance, nausea, reflux, vomiting, diarrhea, food intolerance, appetite changes, gas  Vitamin / Mineral supplementation of Multivitamin with minerals and Vitamin D  Current vitamins:  Vitamin C, Probiotic, Iron, B12, Elderberry, Vit D, Primrose Oil, Multivitamin gummy Vitafusion, Mg    Patient is not currently pregnant and doesn't desire to become pregnant a minimum of one year post-op    Education provided to: patient  Barriers to learning: No barriers identified  Readiness to change: preparation  Prior research on procedure: pre-op class and friends or family  Comprehension: needs reinforcement and verbalizes understanding   Expected Compliance: fair    Recommendations  Pt is an appropriate candidate for surgery  Yes  5% wt loss=13 095#=Day of surgery goal of 248 805#     Lab Results   Component Value Date    HGBA1C 7 1 (H) 04/21/2022     Bloodwork ordered to be done April 2022  Evaluation / Monitoring  Dietitian to Monitor: Eating pattern as discussed Tolerance of nutrition prescription Body weight Lab values Physical activity    Goals  Food journal, Exercise 30 minutes 5 times per week, Complete lession plans 1-6, Eat 3 meals per day and Eliminate mindless snacking   Previous month's goals:  Goals:  1  Eating breakfast    2   Hydrate more   3  Exercise more (walk around development and walk dogs)    Workflow: (Incomplete in [place]):   Psych and/or D+A Clearance: Psychiatric assessment from [facility] behavioral health  (Received 1/24/2022)    PCP Letter: written by PCP 1/24/22, scanned into Epic 2/2/2022    Support Group: done 1/12/22   Surgeon Appt  Done 3/11/22   EGD done 6/1/22   Cardiac Risk Assessment consult scheduled for 6/29/22   Sleep Studies not needed   Blood work done 4/21/22   Nicotine test nonsmoker   6 Month Pre-Operative Program: 5 of 6 today  6 of 6 scheduled for 7/7/22 with SW    Weight Loss 5% wt loss=13 095#=Day of surgery goal of 248 805#      Time Spent:   30 minutes

## 2022-06-09 ENCOUNTER — OFFICE VISIT (OUTPATIENT)
Dept: BARIATRICS | Facility: CLINIC | Age: 42
End: 2022-06-09

## 2022-06-09 VITALS — BODY MASS INDEX: 50.62 KG/M2 | WEIGHT: 250.6 LBS

## 2022-06-09 PROCEDURE — RECHECK: Performed by: DIETITIAN, REGISTERED

## 2022-07-06 NOTE — PROGRESS NOTES
Behavioral Health Follow Up Note:       6/ 6  Weight Check:  Dr Linwood Talley     Starting weight 261 9 #  Today's weight  250 4  #  Surgery goal 248 8  #     Surgery month:  Aug/Sept  Surgery deadline: December       Mental health and wellness -  Logging her food intake  Using an AP on her phone,   Feels she is getting enough protein  Stated she tested positive for COVID on June 26, 2022  Goals post surgery are to follow with weight maintenance  Is going to start using the AP program in more detail to get more use out of it  Ordering food on line and also going to the grocery store  Carrying the groceries up the two flight of steps  Working with her therapist   Daughter stated her mother has a 'shopping addiction"  Ordering food as needed daily   in program for a revision  Using instapot for cooking  Made "Thanksgiving dinner"  the other month - she loves Thanksgiving dinner and feared she would not enjoy it this year  Provided a new bariatric manual as she lost in the move      Eating behaviors -  Hydration has improved  Has been a struggle  Has protein powder and sample of vitamins       Activity -   Still looking at joining a gym  Has been active with yard work         Progress toward program requirements     Workflow:   · Psych and/or D+A Clearance: n/a   · PCP Letter: 4/14/2022  · Support Group: 1/12/2022   · Surgeon Appt : 3/11/2022  · EGD : 6/1/2022  · Cardiac Risk Assessment: 8/15/2022  · Sleep Studies: n/a   · Bloodwork: pending  ·   Goals:  1  Can exercise more  2   Can hydrate more

## 2022-07-07 ENCOUNTER — OFFICE VISIT (OUTPATIENT)
Dept: BARIATRICS | Facility: CLINIC | Age: 42
End: 2022-07-07

## 2022-07-07 VITALS — BODY MASS INDEX: 50.57 KG/M2 | WEIGHT: 250.4 LBS

## 2022-07-07 PROCEDURE — RECHECK

## 2022-08-08 NOTE — PROGRESS NOTES
Bariatric Nutrition Follow-Up Note    Type of surgery    Preop 6 months: 6 of 6 completed 2022  Surgery Date: TBD- tentative August-2022  Deadline month 2022  Surgeon: Dr Keri Joyner  Nutrition Assessment   Rodrick Tejada  39 y o   female     Wt with BMI of 25: 124lbs  Pre-Op Excess Wt: 137 9lbs  Wt 113 kg (249 lb 4 8 oz)   BMI 50 35 kg/m²    1 1lb wt loss from last month   12 6lb total wt loss  Pt needs additional 0 5lb wt loss by day of surgery  Pre-op weight loss goal= 5% wt loss=13 095#=Day of surgery goal of 248 805#  [facility] Equation:     Weight maintenance= 2110 kcal/day  Estimated calories for weight loss 2593-2717 kcal/day ( 1-2# per wk wt loss - sedentary )  Estimated protein needs 56 4-67 6 g/day (1 0-1 2 gms/kg IBW )   Estimated fluid needs 5323-9319 ml/day (30-35 ml/kg IBW)    Review of History and Medications   Past Medical History:   Diagnosis Date    Abdominal hernia     Anemia     Anxiety     Arthritis     Asthma     Depression     Dermatitis     On neck    Diabetes mellitus (HCC)     GERD (gastroesophageal reflux disease)     Hypertension     History of- no longer on medication    Obesity     Reflux esophagitis     Seasonal allergies     Wears partial dentures     Top     Past Surgical History:   Procedure Laterality Date     SECTION      x3    CHOLECYSTECTOMY      Laparoscopic    COLONOSCOPY N/A 3/11/2016    Procedure: COLONOSCOPY;  Surgeon: Kathy Russell MD;  [facility];   Service:     HERNIA REPAIR      [place] LAP,DIAGNOSTIC ABDOMEN N/A 10/25/2017    Procedure: LAPAROSCOPY DIAGNOSTIC, HERNIA REPAIR WITH MESH  WITH LYSIS ADHESIONS;  Surgeon: Kathy Russell MD;  Location: [facility] OR;  Service: General     Social History     Socioeconomic History    Marital status: /Civil Union     Spouse name: Not on file    Number of children: Not on file    Years of education: Not on file    Highest education level: Not on file   Occupational History    Not on file   Tobacco Use    Smoking status: Never Smoker    Smokeless tobacco: Never Used   Vaping Use    Vaping Use: Never used   Substance and Sexual Activity    Alcohol use: No    Drug use: No    Sexual activity: Yes     Partners: Male     Birth control/protection: OCP   Other Topics Concern    Not on file   Social History Narrative    Not on file     Social Determinants of Health     Financial Resource Strain: Not on file   Food Insecurity: Not on file   Transportation Needs: Not on file   Physical Activity: Not on file   Stress: Not on file   Social Connections: Not on file   Intimate Partner Violence: Not on file   Housing Stability: Not on file       Current Outpatient Medications:     acetaminophen (TYLENOL) 500 mg tablet, Take 500 mg by mouth every 6 (six) hours as needed for mild pain, Disp: , Rfl:     ALAWAY 0 025 % ophthalmic solution, , Disp: , Rfl:     albuterol (PROVENTIL HFA,VENTOLIN HFA) 90 mcg/act inhaler, Inhale 2 puffs every 6 (six) hours as needed for wheezing, Disp: , Rfl:     atomoxetine (STRATTERA) 80 MG capsule, Take 80 mg by mouth, Disp: , Rfl:     CARAFATE 1 GM/10ML suspension, TAKE 10 MILLILITER THREE TIMES DAILY (Patient not taking: Reported on 6/1/2022), Disp: , Rfl: 3    cetirizine (ZyrTEC) 10 mg tablet, Take 10 mg by mouth daily  , Disp: , Rfl:     cyanocobalamin (VITAMIN B-12) 1,000 mcg tablet, Take 1,000 mcg by mouth daily  , Disp: , Rfl:     cycloSPORINE (RESTASIS) 0 05 % ophthalmic emulsion, Administer 1 drop to both eyes every 12 (twelve) hours, Disp: , Rfl:     Dextromethorphan Polistirex ER (DELSYM) 30 MG/5ML SUER, Take 5 mL (30 mg total) by mouth every 8 (eight) hours as needed (cough) (Patient not taking: No sig reported), Disp: 148 mL, Rfl: 0    ferrous sulfate 325 (65 Fe) mg tablet, Take 1 tablet by mouth daily with breakfast, Disp: , Rfl: 5    levonorgestrel-ethinyl estradiol (Sronyx) 0 1-20 MG-MCG per tablet, Take 1 tablet by mouth daily, Disp: 28 tablet, Rfl: 11    metFORMIN (GLUCOPHAGE-XR) 500 mg 24 hr tablet, Take 500 mg by mouth 2 (two) times a day with meals, Disp: , Rfl:     methocarbamol (ROBAXIN) 500 mg tablet, Take 500 mg by mouth 4 (four) times a day as needed, Disp: , Rfl:     oxybutynin (DITROPAN) 5 mg tablet, Take 5 mg by mouth 2 (two) times a day, Disp: , Rfl:     pantoprazole (PROTONIX) 40 mg tablet, Take 40 mg by mouth every morning  , Disp: , Rfl:     traMADol (ULTRAM) 50 mg tablet, Take 50 mg by mouth every 6 (six) hours as needed, Disp: , Rfl:      Food Intake and Lifestyle Assessment   Food Intake Assessment completed via [employer] food journals:  Pt has been journaling in [employer] consistently most days  Calories avg between 6951-1294 per day  Protein consistently over 60g/day  Breakfast:  Coffee with creamer, collage powder, protein shake  Snack: none   Lunch: Pt has been getting less take out  Pt has tortellini and breadsticks and mozzarella bites in the air fryer and one meatball with marinara sauce  Snack: none  Dinner: pasta salad and meatballs OR chicken stew with rice and beans  Pt has been eliminating rice and pasta  Snack: rice cakes or single serve bag of white cheddar popcorn or single serve package of jaswinder cookies  Pt has been eliminating her night time snack  Beverage intake: coffee, diet iced tea, diet ginger ale, flavored seltzer, kid's coconut harrell  Pt is decreasing her carbonated drinks and does not drink any drinks with sugars at this time  Pt found a non-carbonated sugar-free water that she is now drinking  Protein supplement: Protein drink for breakfast   Estimated protein intake per day: 60-90g  Estimated fluid intake per day: Pt is working on increasing her fluids, at least 48oz/day, 1 cup coffee in the am   Meals eaten away from home: 3+:  Pt reports this has significantly decreased and is now less than once a week    Typical meal pattern: 2-3 meals per day and 0 snacks per day  Eating Behaviors: Consumption of high calorie/ high fat foods, Large portion sizes and Frequent snacking/ grazing  Pt reports that she used to wake up in the middle of the night and eat dessert foods/cakes/cookies, but was started on trazadone and is now sleeping throughout the night  Pt reports that for the month that they were living in the hotel they ate very poorly and got takeout for most meals  Pt today reports that they are now cooking most meals at home and are getting take out only once a week at most   Pt is drinking only calorie-free caffeine-free beverages for hydration and is working on getting her fluid intake up  Food allergies or intolerances: Allergies   Allergen Reactions    Advil Sinus Congestion & Pain [Phenylephrine-Ibuprofen] Hives    Gabapentin Swelling     Legs and hands    Ibuprofen Hives    Meloxicam     Benadryl [Diphenhydramine] Other (See Comments)     palpatations    Medical Tape Rash     And hives    Nystatin Hives    Wound Dressings Rash     Cultural or Mormon considerations: none    Physical Assessment  Physical Activity  Types of exercise: None  Sedentary  States she used to walk  Current physical limitations: breathing issues  Pt reports she has been trying to get disability for the past 2-3 years due to nerve pain in her arm  Psychosocial Assessment   Support systems: spouse and children, parent(s)  Socioeconomic factors:  and four kids: 24, 24, 15, 6yo  Per pt report, family got kicked out of her apartment, has been living with her parents for about a week while they look for a new apartment  Since initial visit pt's family has moved out of the parent's home, stayed in a hotel for a month, and now moved into their own apartment  Receives food stamps: $600/month  Moved here from [facility] in 2008   had RNY one year after they met   Pt reports  brings desserts and potato chips in to the home     Nutrition Diagnosis-Continued  Diagnosis: Overweight / Obesity (NC-3 3), Excessive energy intake (NI-1 5) and Undesirable food choices (NB-1 7)  Related to: Food and nutrition-related knowledge deficit, Physical inactivity and Excessive energy intake  As Evidenced by: BMI >25, Excessive energy intake, Unintentional weight gain and Reports of disorded eating patterns     Nutrition Prescription: Recommend the following diet  Regular    Interventions and Teaching   Discussed pre-op and post-op nutrition guidelines  Patient educated and handouts provided  Surgical changes to stomach / GI  Capacity of post-surgery stomach  Diet progression  Adequate hydration  Sugar and fat restriction to decrease "dumping syndrome"  Expected weight loss  Weight loss plateaus/ possibility of weight regain  Exercise  Suggestions for pre-op diet  Nutrition considerations after surgery  Protein supplements  Dietary and lifestyle changes  Possible problems with poor eating habits  Techniques for self monitoring and keeping daily food journal  Potential for food intolerance after surgery, and ways to deal with them including: lactose intolerance, nausea, reflux, vomiting, diarrhea, food intolerance, appetite changes, gas  Vitamin / Mineral supplementation of Multivitamin with minerals and Vitamin D  Current vitamins:  Vitamin C, Probiotic, Iron, B12, Elderberry, Vit D, Primrose Oil, Multivitamin gummy Vitafusion, Mg    Patient is not currently pregnant and doesn't desire to become pregnant a minimum of one year post-op    Education provided to: patient  Barriers to learning: No barriers identified  Readiness to change: action  Prior research on procedure: pre-op class and friends or family  Comprehension: needs reinforcement and verbalizes understanding   Expected Compliance: good    Recommendations  Pt is an appropriate candidate for surgery   Yes  5% wt loss=13 095#=Day of surgery goal of 248 805#     Lab Results   Component Value Date    HGBA1C 7 1 (H) 04/21/2022     Pt reports that she does not check her own blood sugars at home, and is supposed to be taking metformin after lunch and dinner but has not because she has eliminated the carb from her meals  Discussed with pt importance of taking meds as prescribed and having HgbA1c under 9 0% to be safe to proceed to surgery  Pt is due to update HgbA1c  Ordered today  Evaluation / Monitoring  Dietitian to Monitor: Eating pattern as discussed Tolerance of nutrition prescription Body weight Lab values Physical activity    Goals  Food journal, Exercise 30 minutes 5 times per week, Complete lession plans 1-6, Eat 3 meals per day and Eliminate mindless snacking   Previous month's goals:  Goals:  1  Eating breakfast    2   Hydrate more   3  Exercise more (walk around development and walk dogs)  Today's goals:  Get bloodwork drawn  Workflow: (Incomplete in [place]):   Psych and/or D+A Clearance: Psychiatric assessment from [facility] behavioral health  (Received 1/24/2022)    PCP Letter: written by PCP 1/24/22, scanned into Epic 2/2/2022    Support Group: done 1/12/22   Surgeon Appt  Done 3/11/22   EGD done 6/1/22   Cardiac Risk Assessment consult now scheduled for 8/15/22   Sleep Studies not needed   Blood work done 4/21/22  Will need updated in October   Nicotine test nonsmoker   6 Month Pre-Operative Program: 6 of 6 completed 7/7/22 with [facility] Weight Loss 5% wt loss=13 095#=Day of surgery goal of 248 805#      Time Spent:   30 minutes

## 2022-08-11 ENCOUNTER — OFFICE VISIT (OUTPATIENT)
Dept: BARIATRICS | Facility: CLINIC | Age: 42
End: 2022-08-11

## 2022-08-11 VITALS — WEIGHT: 249.3 LBS | BODY MASS INDEX: 50.35 KG/M2

## 2022-08-11 DIAGNOSIS — Z01.818 PREOPERATIVE CLEARANCE: ICD-10-CM

## 2022-08-11 DIAGNOSIS — Z01.812 BLOOD TESTS PRIOR TO TREATMENT OR PROCEDURE: ICD-10-CM

## 2022-08-11 DIAGNOSIS — E11.9 TYPE 2 DIABETES MELLITUS WITHOUT COMPLICATION, WITHOUT LONG-TERM CURRENT USE OF INSULIN (HCC): ICD-10-CM

## 2022-08-11 PROCEDURE — RECHECK: Performed by: DIETITIAN, REGISTERED

## 2022-08-12 ENCOUNTER — TELEPHONE (OUTPATIENT)
Dept: BARIATRICS | Facility: CLINIC | Age: 42
End: 2022-08-12

## 2022-08-12 LAB — HBA1C MFR BLD HPLC: 7.1 %

## 2022-08-12 NOTE — PROGRESS NOTES
Cardiology Consultation     Manuel De Los Santos  [id number]  1980  [address]  [address] [facility] [id number]      1  Preop cardiovascular exam  POCT ECG   2  BMI 50 0-59 9, adult [facility])  Ambulatory referral to Cardiology   3  Hyperlipidemia associated with type 2 diabetes mellitus ([address] )     4  Type 2 diabetes mellitus without complication, without long-term current use of insulin ([facility])         Discussion/Summary:  Preoperative cardiovascular exam   -currently being evaluated for bariatric surgery with Dr Juice Biswas  -no acute ischemic changes on EKG   -able to complete > 4 Mets of activity without chest pain or shortness of breath  -TTE 12/02/2021 at [facility] showed normal EF with no significant valvular disease  -she is stable from a cardiac standpoint for bariatric surgery and does not require any further cardiac testing at this time  Type 2 diabetes   -hemoglobin A1c 7 1 on 04/21/2022  Hyperlipidemia   -lipid panel 04/21/2022 total cholesterol 155, triglycerides 287, HDL 38, LDL 60  -triglycerides elevated, recommended dietary changes  Morbid obesity with BMI of 50    History of Present Illness:  Manuel De Los Santos is a 39y o  year old female with a past medical history of GERD, type 2 diabetes, hyperlipidemia, asthma and morbid obesity  She presents today for preoperative cardiovascular exam for bariatric surgery  She is accompanied by her daughter today  Reports feeling well and has no complaints  Reports being at ambulate and ascend a flight of stairs without any chest pain or shortness of breath  Her mobility is mostly limited by left-sided sciatica and left upper extremity nerve damage  She is able to do her grocery shopping without any chest pain or shortness of breath as well  She remembers being told she had a murmur as a child, but otherwise denies any other cardiac history  No significant family history of heart disease    Does report her father has diabetes and peripheral vascular disease      Patient Active Problem List   Diagnosis    BMI 50 0-59 9, adult ([facility] )    Breast tenderness in female    Vaginal pruritus    Frequency of urination    Anxiety state    Gastroesophageal reflux disease without esophagitis    Hyperlipidemia associated with type 2 diabetes mellitus ([facility])    Low back pain    Mild persistent asthma without complication    Type 2 diabetes mellitus without complication, without long-term current use of insulin ([facility])     Past Medical History:   Diagnosis Date    Abdominal hernia     Anemia     Anxiety     Arthritis     Asthma     Depression     Dermatitis     On neck    Diabetes mellitus ([facility])     GERD (gastroesophageal reflux disease)     Hypertension     History of- no longer on medication    Obesity     Reflux esophagitis     Seasonal allergies     Wears partial dentures     Top     Social History     Socioeconomic History    Marital status: /Civil Union     Spouse name: Not on file    Number of children: Not on file    Years of education: Not on file    Highest education level: Not on file   Occupational History    Not on file   Tobacco Use    Smoking status: Never Smoker    Smokeless tobacco: Never Used   Vaping Use    Vaping Use: Never used   Substance and Sexual Activity    Alcohol use: No    Drug use: No    Sexual activity: Yes     Partners: Male     Birth control/protection: OCP   Other Topics Concern    Not on file   Social History Narrative    Not on file     Social Determinants of Health     Financial Resource Strain: Not on file   Food Insecurity: Not on file   Transportation Needs: Not on file   Physical Activity: Not on file   Stress: Not on file   Social Connections: Not on file   Intimate Partner Violence: Not on file   Housing Stability: Not on file      Family History   Problem Relation Age of Onset    Skin cancer Mother     Cancer Mother     Kidney cancer Father     Skin cancer Father     Diabetes Father     Cancer Father     No Known Problems Daughter     No Known Problems Daughter     No Known Problems Daughter      Past Surgical History:   Procedure Laterality Date     SECTION      x3    CHOLECYSTECTOMY      Laparoscopic    COLONOSCOPY N/A 3/11/2016    Procedure: COLONOSCOPY;  Surgeon: Jason Krueger MD;  Location: [facility]; Service:     HERNIA REPAIR      OR LAP,DIAGNOSTIC ABDOMEN N/A 10/25/2017    Procedure: LAPAROSCOPY DIAGNOSTIC, HERNIA REPAIR WITH MESH  WITH LYSIS ADHESIONS;  Surgeon: Jason Krueger MD;  Location: [facility];  Service: General       Current Outpatient Medications:     acetaminophen (TYLENOL) 500 mg tablet, Take 500 mg by mouth every 6 (six) hours as needed for mild pain, Disp: , Rfl:     albuterol (PROVENTIL HFA,VENTOLIN HFA) 90 mcg/act inhaler, Inhale 2 puffs every 6 (six) hours as needed for wheezing, Disp: , Rfl:     atomoxetine (STRATTERA) 80 MG capsule, Take 80 mg by mouth, Disp: , Rfl:     cetirizine (ZyrTEC) 10 mg tablet, Take 10 mg by mouth daily  , Disp: , Rfl:     cyanocobalamin (VITAMIN B-12) 1,000 mcg tablet, Take 1,000 mcg by mouth daily  , Disp: , Rfl:     cycloSPORINE (RESTASIS) 0 05 % ophthalmic emulsion, Administer 1 drop to both eyes every 12 (twelve) hours, Disp: , Rfl:     escitalopram (LEXAPRO) 10 mg tablet, , Disp: , Rfl:     escitalopram (LEXAPRO) 20 mg tablet, , Disp: , Rfl:     ferrous sulfate 325 (65 Fe) mg tablet, Take 1 tablet by mouth daily with breakfast, Disp: , Rfl: 5    levonorgestrel-ethinyl estradiol (Sronyx) 0 1-20 MG-MCG per tablet, Take 1 tablet by mouth daily, Disp: 28 tablet, Rfl: 11    metFORMIN (GLUCOPHAGE-XR) 500 mg 24 hr tablet, Take 500 mg by mouth 2 (two) times a day with meals, Disp: , Rfl:     methocarbamol (ROBAXIN) 500 mg tablet, Take 500 mg by mouth 4 (four) times a day as needed, Disp: , Rfl:     montelukast (SINGULAIR) 10 mg tablet, , Disp: , Rfl:     omeprazole (PriLOSEC) 40 MG capsule, Take 40 mg by mouth 2 (two) times a day, Disp: , Rfl:     OneTouch Verio test strip, USE TO CHECK BLOOD SUGARS DAILY  , Disp: , Rfl:     oxybutynin (DITROPAN) 5 mg tablet, Take 5 mg by mouth 2 (two) times a day, Disp: , Rfl:     traMADol (ULTRAM) 50 mg tablet, Take 50 mg by mouth every 6 (six) hours as needed, Disp: , Rfl:     traZODone (DESYREL) 100 mg tablet, TAKE 1 TO 2 TABLETS BY MOUTH AT BEDTIME FOR INSOMNIA , Disp: , Rfl:     trospium chloride (SANCTURA) 20 mg tablet, Take 20 mg by mouth 2 (two) times a day, Disp: , Rfl:     ALAWAY 0 025 % ophthalmic solution, , Disp: , Rfl:     CARAFATE 1 GM/10ML suspension, TAKE 10 MILLILITER THREE TIMES DAILY (Patient not taking: No sig reported), Disp: , Rfl: 3    Dextromethorphan Polistirex ER (DELSYM) 30 MG/5ML SUER, Take 5 mL (30 mg total) by mouth every 8 (eight) hours as needed (cough) (Patient not taking: No sig reported), Disp: 148 mL, Rfl: 0    pantoprazole (PROTONIX) 40 mg tablet, Take 40 mg by mouth every morning   (Patient not taking: Reported on 8/15/2022), Disp: , Rfl:   Allergies   Allergen Reactions    Advil Sinus Congestion & Pain [Phenylephrine-Ibuprofen] Hives    Gabapentin Swelling     Legs and hands    Ibuprofen Hives    Meloxicam     Benadryl [Diphenhydramine] Other (See Comments)     palpatations    Medical Tape Rash     And hives    Nystatin Hives    Wound Dressings Rash         Labs:  Lab Results   Component Value Date    ALT 35 04/21/2022    AST 24 04/21/2022    BUN 15 04/21/2022    CALCIUM 9 4 04/21/2022     04/21/2022    CO2 27 04/21/2022    CREATININE 0 57 (L) 04/21/2022    HDL 38 (L) 04/21/2022    HCT 36 9 04/21/2022    HGB 10 8 (L) 04/21/2022    HGBA1C 7 1 (H) 08/12/2022     04/21/2022    K 4 2 04/21/2022    TRIG 287 (H) 04/21/2022    WBC 12 75 (H) 04/21/2022       Imaging: No results found      ECG:  Normal sinus rhythm, low-voltage QRS    Review of Systems:  Review of Systems   Constitutional: Negative for diaphoresis and fatigue  HENT: Positive for congestion and voice change  Eyes: Negative for photophobia and visual disturbance  Respiratory: Negative for apnea, chest tightness and shortness of breath  Cardiovascular: Negative for chest pain, palpitations and leg swelling  Gastrointestinal: Negative for abdominal distention, abdominal pain, nausea and vomiting  Genitourinary: Negative for dysuria  Musculoskeletal: Positive for arthralgias  Negative for joint swelling  Skin: Negative for color change, pallor and rash  Neurological: Negative for dizziness, syncope and headaches  Psychiatric/Behavioral: Negative for agitation and confusion           Vitals:    08/15/22 1100   BP: 134/84   Pulse: 99      Vitals:    08/15/22 1100   Weight: 113 kg (248 lb 12 8 oz)     Height: 4' 11" (149 9 cm)     Physical Exam:  General appearance:  Appears stated age, alert, well appearing and in no distress, morbidly obese   HEENT:  PERRLA, EOMI, no scleral icterus, no conjunctival pallor  NECK:  Supple, No elevated JVP, no thyromegaly, no carotid bruits  HEART:  Regular rate and rhythm, normal S1/S2, no S3/S4, no murmur or rub  LUNGS:  Clear to auscultation bilaterally  ABDOMEN:  Soft, non-tender, positive bowel sounds, no rebound or guarding, no organomegaly   EXTREMITIES:  No edema  VASCULAR:  Normal pedal pulses   SKIN: No lesions or rashes on exposed skin  NEURO:  CN II-XII intact, no focal deficits

## 2022-08-12 NOTE — TELEPHONE ENCOUNTER
Pt called concerned about abnormal CBC results  Reviewed with pt that WBC are elevated at 13  Pt denies any signs or symptoms of current illness or infection  Instructed pt to follow up with PCP to see if further evaluation is needed  Hgb is still ok to proceed with surgery, but has been trending down the last several times it was checked  Discussed with pt adding more high iron foods such as red meats, green leafy vegetables, beans to her diet and continue to take her iron-containing multivitamin and additional iron supplement  Phone call then got disconnected  The remaining bloodwork drawn today is still pending at this time

## 2022-08-15 ENCOUNTER — CONSULT (OUTPATIENT)
Dept: CARDIOLOGY CLINIC | Facility: CLINIC | Age: 42
End: 2022-08-15
Payer: COMMERCIAL

## 2022-08-15 VITALS
HEART RATE: 99 BPM | BODY MASS INDEX: 50.16 KG/M2 | HEIGHT: 59 IN | WEIGHT: 248.8 LBS | SYSTOLIC BLOOD PRESSURE: 134 MMHG | DIASTOLIC BLOOD PRESSURE: 84 MMHG

## 2022-08-15 DIAGNOSIS — E11.69 HYPERLIPIDEMIA ASSOCIATED WITH TYPE 2 DIABETES MELLITUS (HCC): ICD-10-CM

## 2022-08-15 DIAGNOSIS — E78.5 HYPERLIPIDEMIA ASSOCIATED WITH TYPE 2 DIABETES MELLITUS (HCC): ICD-10-CM

## 2022-08-15 DIAGNOSIS — E11.9 TYPE 2 DIABETES MELLITUS WITHOUT COMPLICATION, WITHOUT LONG-TERM CURRENT USE OF INSULIN (HCC): ICD-10-CM

## 2022-08-15 DIAGNOSIS — Z01.810 PREOP CARDIOVASCULAR EXAM: Primary | ICD-10-CM

## 2022-08-15 PROCEDURE — 93000 ELECTROCARDIOGRAM COMPLETE: CPT | Performed by: STUDENT IN AN ORGANIZED HEALTH CARE EDUCATION/TRAINING PROGRAM

## 2022-08-15 PROCEDURE — 99244 OFF/OP CNSLTJ NEW/EST MOD 40: CPT | Performed by: STUDENT IN AN ORGANIZED HEALTH CARE EDUCATION/TRAINING PROGRAM

## 2022-08-15 RX ORDER — OMEPRAZOLE 40 MG/1
40 CAPSULE, DELAYED RELEASE ORAL 2 TIMES DAILY
COMMUNITY
Start: 2022-08-03

## 2022-08-15 RX ORDER — ESCITALOPRAM OXALATE 20 MG/1
TABLET ORAL
COMMUNITY
Start: 2022-08-13

## 2022-08-15 RX ORDER — TROSPIUM CHLORIDE 20 MG/1
20 TABLET, FILM COATED ORAL 2 TIMES DAILY
COMMUNITY
Start: 2022-07-30

## 2022-08-15 RX ORDER — ESCITALOPRAM OXALATE 10 MG/1
TABLET ORAL
COMMUNITY
Start: 2022-08-10

## 2022-08-15 RX ORDER — TRAZODONE HYDROCHLORIDE 100 MG/1
TABLET ORAL
COMMUNITY
Start: 2022-07-13

## 2022-08-15 RX ORDER — BLOOD SUGAR DIAGNOSTIC
STRIP MISCELLANEOUS
COMMUNITY
Start: 2022-05-14

## 2022-08-15 RX ORDER — MONTELUKAST SODIUM 10 MG/1
TABLET ORAL
COMMUNITY
Start: 2022-08-09

## 2022-09-07 NOTE — PROGRESS NOTES
Behavioral Health Follow Up Note:      Surgery scheduled for Nov 14  Class and final H&P:  Oct 27th   RYGB   Completed   Weight Check:  Dr Steve Manuel     Starting weight 261 9 #  Today's weight  248  5  #  Surgery goal 248 8  #     Surgery month:  Aug/Sept  Surgery deadline: December       Mental health and wellness -  Logging her food intake  Using an AP on her phone,    Purchasing vitamins and protein shakes soon  Prepping for surgery and post surgery life style  Sleep decreased since school started  Make a cheat sheet of protein  Not looking forward to the pure stage  Printed out recipes for post surgery   Daughter is her little "helper" and asked many questions about her mom's post surgery life         Eating behaviors -  Hydration has improved  Has been a struggle    Has protein powder and sample of vitamins  Daughter prepared breakfast this morning  Stated it was healthy      Activity -  walking a lot   A lot of steps/stairs in the home      Progress toward program requirements     Workflow:   · Psych and/or D+A Clearance: n/a   · PCP Letter: 4/14/2022  · Support Group: 1/12/2022   · Surgeon Appt : 3/11/2022  · EGD : 5/4/4112  · Cardiac Risk Assessment: 8/15/2022  · Sleep Studies: n/a   · Bloodwork: done  ·    Goals:  1  Can exercise more  2   Can hydrate more

## 2022-09-08 ENCOUNTER — OFFICE VISIT (OUTPATIENT)
Dept: BARIATRICS | Facility: CLINIC | Age: 42
End: 2022-09-08

## 2022-09-08 VITALS — WEIGHT: 248.5 LBS | BODY MASS INDEX: 50.19 KG/M2

## 2022-09-08 PROCEDURE — RECHECK

## 2022-10-27 ENCOUNTER — TELEPHONE (OUTPATIENT)
Dept: BARIATRICS | Facility: CLINIC | Age: 42
End: 2022-10-27

## 2022-10-27 NOTE — TELEPHONE ENCOUNTER
Lm to contact office about upcoming surgery  Will need to be canceled if no response back by end of day

## 2022-10-29 DIAGNOSIS — Z30.41 ENCOUNTER FOR SURVEILLANCE OF CONTRACEPTIVE PILLS: ICD-10-CM

## 2022-11-01 RX ORDER — LEVONORGESTREL AND ETHINYL ESTRADIOL 0.1-0.02MG
KIT ORAL
Qty: 84 TABLET | Refills: 3 | Status: SHIPPED | OUTPATIENT
Start: 2022-11-01

## 2023-11-15 ENCOUNTER — TELEPHONE (OUTPATIENT)
Age: 43
End: 2023-11-15

## 2023-11-15 NOTE — TELEPHONE ENCOUNTER
Rec'd call from patient requesting NEW for possible FULL BODY. Explained wait/cancellation list processes and  MyChart notification Understanding was verbalized. Created wait/cancellation list for patient.

## (undated) DEVICE — 2963 MEDIPORE SOFT CLOTH TAPE 3 IN X 10 YD 12 RLS/CS: Brand: 3M™ MEDIPORE™

## (undated) DEVICE — LIGHT GLOVE GREEN

## (undated) DEVICE — GLOVE SRG BIOGEL 6.5

## (undated) DEVICE — 3M™ STERI-STRIP™ COMPOUND BENZOIN TINCTURE 40 BAGS/CARTON 4 CARTONS/CASE C1544: Brand: 3M™ STERI-STRIP™

## (undated) DEVICE — ALLENTOWN LAP CHOLE APP PACK: Brand: CARDINAL HEALTH

## (undated) DEVICE — PLASTIC ADHESIVE BANDAGE: Brand: CURITY

## (undated) DEVICE — CHLORAPREP HI-LITE 26ML ORANGE

## (undated) DEVICE — HARMONIC ACE 5MM DIAMETER SHEARS 36CM SHAFT LENGTH + ADAPTIVE TISSUE TECHNOLOGY FOR USE WITH GENERATOR G11: Brand: HARMONIC ACE

## (undated) DEVICE — SMOKE EVACUATION TUBING WITH 7/8 IN TO 1/4 IN REDUCER: Brand: BUFFALO FILTER

## (undated) DEVICE — CLOSURE DEVICE ENDO CLOSE

## (undated) DEVICE — GLOVE INDICATOR PI UNDERGLOVE SZ 6.5 BLUE

## (undated) DEVICE — 5 MM CURVED DISSECTORS WITH MONOPOLAR CAUTERY: Brand: ENDOPATH

## (undated) DEVICE — 3M™ STERI-STRIP™ REINFORCED ADHESIVE SKIN CLOSURES, R1547, 1/2 IN X 4 IN (12 MM X 100 MM), 6 STRIPS/ENVELOPE: Brand: 3M™ STERI-STRIP™

## (undated) DEVICE — SUT MONOCRYL 4-0 PS-2 27 IN Y426H

## (undated) DEVICE — SORBAFIX ABSORBABLE FIXATION SYSTEM 30 ABSORBABLE FASTENERS: Brand: SORBAFIX ABSORBABLE FIXATION SYSTEM

## (undated) DEVICE — [HIGH FLOW INSUFFLATOR,  DO NOT USE IF PACKAGE IS DAMAGED,  KEEP DRY,  KEEP AWAY FROM SUNLIGHT,  PROTECT FROM HEAT AND RADIOACTIVE SOURCES.]: Brand: PNEUMOSURE

## (undated) DEVICE — LAPAROSCOPIC SMOKE EVAC TUBING

## (undated) DEVICE — BLUE HEAT SCOPE WARMER

## (undated) DEVICE — ADHESIVE SKN CLSR HISTOACRYL FLEX 0.5ML LF

## (undated) DEVICE — GLOVE INDICATOR PI UNDERGLOVE SZ 7 BLUE

## (undated) DEVICE — TELFA NON-ADHERENT ABSORBENT DRESSING: Brand: TELFA

## (undated) DEVICE — FILTER W/WATER TRAP ULPA INPUT

## (undated) DEVICE — SUT PROLENE 1 CT-1 30 IN 8425H

## (undated) DEVICE — INTENDED FOR TISSUE SEPARATION, AND OTHER PROCEDURES THAT REQUIRE A SHARP SURGICAL BLADE TO PUNCTURE OR CUT.: Brand: BARD-PARKER SAFETY BLADES SIZE 15, STERILE

## (undated) DEVICE — SPONGE LAP 18 X 18 IN

## (undated) DEVICE — FIXATION SECURE STRAP 5MM W/12 ABSORABLE STRAPS

## (undated) DEVICE — IRRIG ENDO FLO TUBING

## (undated) DEVICE — SCD SEQUENTIAL COMPRESSION COMFORT SLEEVE MEDIUM KNEE LENGTH: Brand: KENDALL SCD

## (undated) DEVICE — GLOVE SRG BIOGEL 7

## (undated) DEVICE — REM POLYHESIVE ADULT PATIENT RETURN ELECTRODE: Brand: VALLEYLAB

## (undated) DEVICE — SUT VICRYL 0 UR-6 27 IN J603H

## (undated) DEVICE — INTENDED FOR TISSUE SEPARATION, AND OTHER PROCEDURES THAT REQUIRE A SHARP SURGICAL BLADE TO PUNCTURE OR CUT.: Brand: BARD-PARKER SAFETY BLADES SIZE 10, STERILE

## (undated) DEVICE — PLUMEPEN PRO 10FT